# Patient Record
Sex: MALE | Race: WHITE | NOT HISPANIC OR LATINO | Employment: FULL TIME | URBAN - METROPOLITAN AREA
[De-identification: names, ages, dates, MRNs, and addresses within clinical notes are randomized per-mention and may not be internally consistent; named-entity substitution may affect disease eponyms.]

---

## 2017-01-28 ENCOUNTER — OFFICE VISIT (OUTPATIENT)
Dept: URGENT CARE | Facility: CLINIC | Age: 47
End: 2017-01-28
Payer: COMMERCIAL

## 2017-01-28 ENCOUNTER — APPOINTMENT (OUTPATIENT)
Dept: RADIOLOGY | Facility: IMAGING CENTER | Age: 47
End: 2017-01-28
Attending: PHYSICIAN ASSISTANT
Payer: COMMERCIAL

## 2017-01-28 VITALS
SYSTOLIC BLOOD PRESSURE: 130 MMHG | HEART RATE: 92 BPM | HEIGHT: 70 IN | WEIGHT: 203 LBS | RESPIRATION RATE: 16 BRPM | DIASTOLIC BLOOD PRESSURE: 88 MMHG | OXYGEN SATURATION: 97 % | TEMPERATURE: 98.5 F | BODY MASS INDEX: 29.06 KG/M2

## 2017-01-28 DIAGNOSIS — M79.672 LEFT FOOT PAIN: ICD-10-CM

## 2017-01-28 PROCEDURE — 99203 OFFICE O/P NEW LOW 30 MIN: CPT | Performed by: PHYSICIAN ASSISTANT

## 2017-01-28 PROCEDURE — 73630 X-RAY EXAM OF FOOT: CPT | Mod: TC | Performed by: PHYSICIAN ASSISTANT

## 2017-01-28 RX ORDER — HYDROCODONE BITARTRATE AND ACETAMINOPHEN 5; 325 MG/1; MG/1
1 TABLET ORAL EVERY 6 HOURS PRN
Qty: 12 TAB | Refills: 0 | Status: SHIPPED | OUTPATIENT
Start: 2017-01-28 | End: 2018-04-19

## 2017-01-28 RX ORDER — PREDNISONE 20 MG/1
40 TABLET ORAL DAILY
Qty: 10 TAB | Refills: 1 | Status: SHIPPED | OUTPATIENT
Start: 2017-01-28 | End: 2017-02-02

## 2017-01-28 RX ORDER — OMEPRAZOLE 20 MG/1
20 CAPSULE, DELAYED RELEASE ORAL DAILY
COMMUNITY
End: 2017-02-16 | Stop reason: SDUPTHER

## 2017-01-28 RX ORDER — LISINOPRIL 20 MG/1
40 TABLET ORAL DAILY
COMMUNITY
End: 2017-02-16 | Stop reason: CLARIF

## 2017-01-28 RX ORDER — TESTOSTERONE CYPIONATE 200 MG/ML
120 INJECTION, SOLUTION INTRAMUSCULAR ONCE
COMMUNITY
End: 2017-07-03 | Stop reason: SDUPTHER

## 2017-01-28 RX ORDER — CEFUROXIME AXETIL 500 MG/1
500 TABLET ORAL 2 TIMES DAILY
Qty: 20 TAB | Refills: 0 | Status: SHIPPED | OUTPATIENT
Start: 2017-01-28 | End: 2017-02-07

## 2017-01-28 ASSESSMENT — ENCOUNTER SYMPTOMS
WEAKNESS: 1
FALLS: 0
JOINT SWELLING: 1
FOCAL WEAKNESS: 1
NUMBNESS: 0
SENSORY CHANGE: 0

## 2017-01-28 NOTE — MR AVS SNAPSHOT
"        Neil Abdi   2017 12:30 PM   Office Visit   MRN: [id number]    Department:  [facility]   Dept Phone:  [phone]    Description:  Male : 1970   Provider:  Sherif Diez PA-C           Reason for Visit     Foot Pain x 2 wks, Lt. foot pain, swelling and brusing      Allergies as of 2017     No Known Allergies      You were diagnosed with     Left foot pain   [833977]         Vital Signs     Blood Pressure Pulse Temperature Respirations Height Weight    130/88 mmHg 92 36.9 °C (98.5 °F) 16 1.778 m (5' 10\") 92.08 kg (203 lb)    Body Mass Index Oxygen Saturation                29.13 kg/m2 97%          Basic Information     Date Of Birth Sex Race Ethnicity Preferred Language    1970 Male White Non- English      Health Maintenance     Patient has no pending health maintenance at this time      Current Immunizations     No immunizations on file.      Below and/or attached are the medications your provider expects you to take. Review all of your home medications and newly ordered medications with your provider and/or pharmacist. Follow medication instructions as directed by your provider and/or pharmacist. Please keep your medication list with you and share with your provider. Update the information when medications are discontinued, doses are changed, or new medications (including over-the-counter products) are added; and carry medication information at all times in the event of emergency situations     Allergies:  No Known Allergies          Medications  Valid as of: 2017 -  2:06 PM    Generic Name Brand Name Tablet Size Instructions for use    Cefuroxime Axetil (Tab) CEFTIN 500 MG Take 1 Tab by mouth 2 times a day for 10 days.        Cetirizine HCl   Take  by mouth.        Hydrocodone-Acetaminophen (Tab) NORCO 5-325 MG Take 1 Tab by mouth every 6 hours as needed.        Lisinopril (Tab) PRINIVIL 20 MG Take 40 mg by mouth every day.        Omeprazole " (CAPSULE DELAYED RELEASE) PRILOSEC 20 MG Take 20 mg by mouth every day.        PredniSONE (Tab) DELTASONE 20 MG Take 2 Tabs by mouth every day for 5 days.        Testosterone Cypionate (Solution) DEPO-TESTOSTERONE 200 MG/ML 50 mg by Intramuscular route Once.        .                 Medicines prescribed today were sent to:     [facility]Y #[id number] - [place] - [address]    [address] [place] [address]    Phone: [phone] Fax: [phone]    Open 24 Hours?: No      Medication refill instructions:       If your prescription bottle indicates you have medication refills left, it is not necessary to call your provider’s office. Please contact your pharmacy and they will refill your medication.    If your prescription bottle indicates you do not have any refills left, you may request refills at any time through one of the following ways: The online [employer] system (except Urgent Care), by calling your provider’s office, or by asking your pharmacy to contact your provider’s office with a refill request. Medication refills are processed only during regular business hours and may not be available until the next business day. Your provider may request additional information or to have a follow-up visit with you prior to refilling your medication.   *Please Note: Medication refills are assigned a new Rx number when refilled electronically. Your pharmacy may indicate that no refills were authorized even though a new prescription for the same medication is available at the pharmacy. Please request the medicine by name with the pharmacy before contacting your provider for a refill.        Your To Do List     Future Labs/Procedures Complete By Expires    DX-FOOT-COMPLETE 3+ LEFT  1/28/2017 1/28/2018         [employer] Access Code: [email or web address]  Expires: 2/27/2017  2:06 PM    [employer]  A secure, online tool to manage your health information     [employer]’s [employer]® is a secure, online tool that connects you to your  personalized health information from the privacy of your home -- day or night - making it very easy for you to manage your healthcare. Once the activation process is completed, you can even access your medical information using the [employer] angie, which is available for free in the Apple Angie store or Google Play store.     [employer] provides the following levels of access (as shown below):   My Chart Features   [facility] Doctor [facility]  Specialists [facility]  Urgent  Care Non-[facility]  Primary Care  Doctor   Email your healthcare team securely and privately 24/7 X X X    Manage appointments: schedule your next appointment; view details of past/upcoming appointments X      Request prescription refills. X      View recent personal medical records, including lab and immunizations X X X X   View health record, including health history, allergies, medications X X X X   Read reports about your outpatient visits, procedures, consult and ER notes X X X X   See your discharge summary, which is a recap of your hospital and/or ER visit that includes your diagnosis, lab results, and care plan. X X       How to register for [employer]:  1. Go to  [email or web address].  2. Click on the Sign Up Now box, which takes you to the New Member Sign Up page. You will need to provide the following information:  a. Enter your [employer] Access Code exactly as it appears at the top of this page. (You will not need to use this code after you’ve completed the sign-up process. If you do not sign up before the expiration date, you must request a new code.)   b. Enter your date of birth.   c. Enter your home email address.   d. Click Submit, and follow the next screen’s instructions.  3. Create a [employer]dacity ID. This will be your [employer] login ID and cannot be changed, so think of one that is secure and easy to remember.  4. Create a [employer] password. You can change your password at any time.  5. Enter your Password Reset Question and Answer. This can  be used at a later time if you forget your password.   6. Enter your e-mail address. This allows you to receive e-mail notifications when new information is available in [employer].  7. Click Sign Up. You can now view your health information.    For assistance activating your [employer] account, call [phone]

## 2017-01-28 NOTE — PROGRESS NOTES
"Subjective:      Neil Abdi is a 46 y.o. male who presents with Foot Pain            Other  This is a new (2 wks left foot inj) problem. The current episode started 1 to 4 weeks ago. The problem occurs constantly. The problem has been unchanged. Associated symptoms include joint swelling and weakness. Pertinent negatives include no numbness. The symptoms are aggravated by bending and walking. He has tried rest for the symptoms. The treatment provided mild relief.       Review of Systems   Musculoskeletal: Positive for joint pain and joint swelling. Negative for falls.   Neurological: Positive for focal weakness and weakness. Negative for sensory change and numbness.          Objective:     /88 mmHg  Pulse 92  Temp(Src) 36.9 °C (98.5 °F)  Resp 16  Ht 1.778 m (5' 10\")  Wt 92.08 kg (203 lb)  BMI 29.13 kg/m2  SpO2 97%     Physical Exam   Constitutional: He is oriented to person, place, and time. He appears well-developed and well-nourished. No distress.   Musculoskeletal: He exhibits edema and tenderness (redn/ttp ball foot/toes 2/3rd).        Left foot: There is tenderness, bony tenderness and swelling.        Feet:    Neurological: He is alert and oriented to person, place, and time. No sensory deficit. He exhibits abnormal muscle tone. Gait abnormal. Coordination normal.   Skin: Skin is warm and dry. There is erythema.   Psychiatric: He has a normal mood and affect. His behavior is normal. Judgment and thought content normal.   Nursing note and vitals reviewed.    Filed Vitals:    01/28/17 1257   BP: 130/88   Pulse: 92   Temp: 36.9 °C (98.5 °F)   Resp: 16   Height: 1.778 m (5' 10\")   Weight: 92.08 kg (203 lb)   SpO2: 97%     Active Ambulatory Problems     Diagnosis Date Noted   • No Active Ambulatory Problems     Resolved Ambulatory Problems     Diagnosis Date Noted   • No Resolved Ambulatory Problems     No Additional Past Medical History     No current outpatient prescriptions on file prior " to visit.     No current facility-administered medications on file prior to visit.     Gargles, Cepacol lozenges, Aleve/Advil as needed for throat pain  History reviewed. No pertinent family history.  Review of patient's allergies indicates no known allergies.         Xr=  ng(read/interpret. By me. Rw)       Assessment/Plan:     ·  L foot pain      · RICE, HC, anti inflamm

## 2017-01-31 ENCOUNTER — TELEPHONE (OUTPATIENT)
Dept: MEDICAL GROUP | Facility: PHYSICIAN GROUP | Age: 47
End: 2017-01-31

## 2017-01-31 NOTE — TELEPHONE ENCOUNTER
NEW PATIENT PRE-VISIT PLANNING    Called Neil Abdi in order to verify health topics prior to the New appointment.     1.  All medications were updated? yes    2.  Allergies were updated? yes    3.  All care teams were updated? N\A       •   Gait devices, O2, CPAP, etc: no        •   Eye professional: N\A       •   Other specialists (GYN, cardiology, endo, etc): N\A    4.  All pharmacies were updated? yes          Current Outpatient Prescriptions   Medication Sig Dispense Refill   • alprazolam (XANAX) 0.25 MG Tab Take 0.25 mg by mouth at bedtime as needed for Sleep.     • omeprazole (PRILOSEC) 20 MG delayed-release capsule Take 20 mg by mouth every day.     • Cetirizine HCl (ZYRTEC ALLERGY PO) Take  by mouth.     • lisinopril (PRINIVIL) 20 MG Tab Take 40 mg by mouth every day.     • testosterone cypionate (DEPO-TESTOSTERONE) 200 MG/ML Solution injection 50 mg by Intramuscular route Once.     • hydrocodone-acetaminophen (NORCO) 5-325 MG Tab per tablet Take 1 Tab by mouth every 6 hours as needed. 12 Tab 0   • predniSONE (DELTASONE) 20 MG Tab Take 2 Tabs by mouth every day for 5 days. 10 Tab 1   • cefUROXime (CEFTIN) 500 MG Tab Take 1 Tab by mouth 2 times a day for 10 days. 20 Tab 0     No current facility-administered medications for this visit.       5.  Patient may be due for these Health Maintenance Topics (update any if possible):            Health Maintenance Due   Topic Date Due   • IMM DTaP/Tdap/Td Vaccine (1 - Tdap) 09/06/1988   • IMM INFLUENZA (1) 09/01/2016                  6.  Immunizations were updated in [facility] using WebIZ?: No  No documentation in WebIz        a. Web Iz Recommendations: Unknown          7.  Former PCP records requested?: no       a. If yes, request was sent to        8.  Notes to provider or MA:       a. PT NOT SURE IF HE WILL KEEP APPOINTMENT , HE DID NOT EVEN KNOW ABOUT THIS APPOINTMENT HE DID NOT SCHEDULE         b.        Pt was encouraged to keep the appointment and to  arrive at least 15-20 minutes early.

## 2017-02-16 ENCOUNTER — OFFICE VISIT (OUTPATIENT)
Dept: MEDICAL GROUP | Facility: PHYSICIAN GROUP | Age: 47
End: 2017-02-16
Payer: COMMERCIAL

## 2017-02-16 VITALS
BODY MASS INDEX: 28.63 KG/M2 | OXYGEN SATURATION: 96 % | HEART RATE: 86 BPM | RESPIRATION RATE: 16 BRPM | TEMPERATURE: 98.7 F | HEIGHT: 70 IN | DIASTOLIC BLOOD PRESSURE: 80 MMHG | SYSTOLIC BLOOD PRESSURE: 128 MMHG | WEIGHT: 200 LBS

## 2017-02-16 DIAGNOSIS — M79.672 LEFT FOOT PAIN: ICD-10-CM

## 2017-02-16 DIAGNOSIS — E29.1 HYPOGONADISM IN MALE: ICD-10-CM

## 2017-02-16 DIAGNOSIS — K21.9 GASTROESOPHAGEAL REFLUX DISEASE WITHOUT ESOPHAGITIS: ICD-10-CM

## 2017-02-16 DIAGNOSIS — J45.990 EXERCISE-INDUCED ASTHMA: ICD-10-CM

## 2017-02-16 DIAGNOSIS — I10 ESSENTIAL HYPERTENSION: ICD-10-CM

## 2017-02-16 DIAGNOSIS — Z88.9 MULTIPLE ALLERGIES: ICD-10-CM

## 2017-02-16 PROCEDURE — 99214 OFFICE O/P EST MOD 30 MIN: CPT | Performed by: NURSE PRACTITIONER

## 2017-02-16 RX ORDER — LISINOPRIL 40 MG/1
40 TABLET ORAL DAILY
COMMUNITY
Start: 2017-02-14 | End: 2017-03-07 | Stop reason: SDUPTHER

## 2017-02-16 RX ORDER — OMEPRAZOLE 20 MG/1
20 CAPSULE, DELAYED RELEASE ORAL DAILY
Qty: 90 CAP | Refills: 3 | Status: SHIPPED | OUTPATIENT
Start: 2017-02-16 | End: 2018-01-31 | Stop reason: SDUPTHER

## 2017-02-16 ASSESSMENT — PATIENT HEALTH QUESTIONNAIRE - PHQ9: CLINICAL INTERPRETATION OF PHQ2 SCORE: 0

## 2017-02-16 ASSESSMENT — PAIN SCALES - GENERAL: PAINLEVEL: 1=MINIMAL PAIN

## 2017-02-16 NOTE — Clinical Note
[facility]  KATHARINA LeggettP.RDESI.  [address]  [place] [address]  Fax: [phone]   Authorization for Release/Disclosure of   Protected Health Information   Name: AGUEDA RAMIRES : 1970 SSN: [id number]   Address: [address] [place] [address] [place] [address] Phone:    [phone] (home)    I authorize the entity listed below to release/disclose the PHI below to:   [facility]/Oswald Burton A.P.R.SANYA. and KATHARINA LeggettP.RDESI.   Provider or Entity Name:  Dr. Worthy   [facility], [place], [place] Phone:  [phone]    Fax:     Reason for request: continuity of care   Information to be released:    [  ] LAST COLONOSCOPY,  including any PATH REPORT and follow-up  [  ] LAST FIT/COLOGUARD RESULT [  ] LAST DEXA  [  ] LAST MAMMOGRAM  [  ] LAST PAP  [  ] LAST LABS [  ] RETINA EXAM REPORT  [  ] IMMUNIZATION RECORDS  [x] Release all info      [  ] Check here and initial the line next to each item to release ALL health information INCLUDING  _____ Care and treatment for drug and / or alcohol abuse  _____ HIV testing, infection status, or AIDS  _____ Genetic Testing    DATES OF SERVICE OR TIME PERIOD TO BE DISCLOSED: _____________  I understand and acknowledge that:  * This Authorization may be revoked at any time by you in writing, except if your health information has already been used or disclosed.  * Your health information that will be used or disclosed as a result of you signing this authorization could be re-disclosed by the recipient. If this occurs, your re-disclosed health information may no longer be protected by State or Federal laws.  * You may refuse to sign this Authorization. Your refusal will not affect your ability to obtain treatment.  * This Authorization becomes effective upon signing and will  on (date) __________.      If no date is indicated, this Authorization will  one (1) year from the signature date.    Name: Agueda  Gibran Abdi    Signature:   Date:     2/16/2017       PLEASE FAX REQUESTED RECORDS BACK TO: [phone]

## 2017-02-16 NOTE — MR AVS SNAPSHOT
"        Neil Currywell   2017 4:40 PM   Office Visit   MRN: [id number]    Department:  [facility]   Dept Phone:  [phone]    Description:  Male : 1970   Provider:  ARA Leggett           Reason for Visit     Establish Care New Patient       Allergies as of 2017     No Known Allergies      You were diagnosed with     Essential hypertension   [1153409]       Gastroesophageal reflux disease without esophagitis   [878976]       Multiple allergies   [290094]       Hypogonadism in male   [5370986]       Exercise-induced asthma   [498761]       Left foot pain   [952475]         Vital Signs     Blood Pressure Pulse Temperature Respirations Height Weight    128/80 mmHg 86 37.1 °C (98.7 °F) 16 1.778 m (5' 10\") 90.719 kg (200 lb)    Body Mass Index Oxygen Saturation Smoking Status             28.70 kg/m2 96% Former Smoker         Basic Information     Date Of Birth Sex Race Ethnicity Preferred Language    1970 Male White Non- English      Your appointments     2017  6:45 AM   Adult Draw/Collection with [facility]   [facility] (--)    [address]  [facility] [address]   [phone]              Problem List              ICD-10-CM Priority Class Noted - Resolved    Essential hypertension I10   2017 - Present    Gastroesophageal reflux disease without esophagitis K21.9   2017 - Present    Multiple allergies Z88.9   2017 - Present    Hypogonadism in male E29.1   2017 - Present    Exercise-induced asthma J45.990   2017 - Present      Health Maintenance        Date Due Completion Dates    IMM INFLUENZA (1) 2018 (Originally 2016) ---    IMM DTaP/Tdap/Td Vaccine (2 - Td) 2026            Current Immunizations     Tdap Vaccine 2016      Below and/or attached are the medications your provider expects you to take. Review all of your home medications and newly ordered medications with your provider and/or pharmacist. " Follow medication instructions as directed by your provider and/or pharmacist. Please keep your medication list with you and share with your provider. Update the information when medications are discontinued, doses are changed, or new medications (including over-the-counter products) are added; and carry medication information at all times in the event of emergency situations     Allergies:  No Known Allergies          Medications  Valid as of: February 16, 2017 -  5:29 PM    Generic Name Brand Name Tablet Size Instructions for use    Albuterol Sulfate   Inhale  by mouth.        Cetirizine HCl   Take  by mouth.        Hydrocodone-Acetaminophen (Tab) NORCO 5-325 MG Take 1 Tab by mouth every 6 hours as needed.        Lisinopril (Tab) PRINIVIL, ZESTRIL 40 MG Take 40 mg by mouth every day.        Omeprazole (CAPSULE DELAYED RELEASE) PRILOSEC 20 MG Take 1 Cap by mouth every day.        Testosterone Cypionate (Solution) DEPO-TESTOSTERONE 200 MG/ mg by Intramuscular route Once.        .                 Medicines prescribed today were sent to:     [facility]/PHARMACY #[id number] - [place] - [address] [address]    Phone: [phone] Fax: [phone]    Open 24 Hours?: No      Medication refill instructions:       If your prescription bottle indicates you have medication refills left, it is not necessary to call your provider’s office. Please contact your pharmacy and they will refill your medication.    If your prescription bottle indicates you do not have any refills left, you may request refills at any time through one of the following ways: The online [employer] system (except Urgent Care), by calling your provider’s office, or by asking your pharmacy to contact your provider’s office with a refill request. Medication refills are processed only during regular business hours and may not be available until the next business day. Your provider may request additional information or to have a follow-up visit  with you prior to refilling your medication.   *Please Note: Medication refills are assigned a new Rx number when refilled electronically. Your pharmacy may indicate that no refills were authorized even though a new prescription for the same medication is available at the pharmacy. Please request the medicine by name with the pharmacy before contacting your provider for a refill.        Your To Do List     Future Labs/Procedures Complete By Expires    COMP METABOLIC PANEL  As directed 2/16/2018    LIPID PROFILE  As directed 2/16/2018    MICROALBUMIN CREAT RATIO URINE  As directed 2/16/2018      Referral     A referral request has been sent to our patient care coordination department. Please allow 3-5 business days for us to process this request and contact you either by phone or mail. If you do not hear from us by the 5th business day, please call us at [phone].           [employer] Access Code: Activation code not generated  Current [employer] Status: Active

## 2017-02-17 ENCOUNTER — HOSPITAL ENCOUNTER (OUTPATIENT)
Dept: LAB | Facility: MEDICAL CENTER | Age: 47
End: 2017-02-17
Attending: NURSE PRACTITIONER
Payer: COMMERCIAL

## 2017-02-17 DIAGNOSIS — I10 ESSENTIAL HYPERTENSION: ICD-10-CM

## 2017-02-17 DIAGNOSIS — K21.9 GASTROESOPHAGEAL REFLUX DISEASE WITHOUT ESOPHAGITIS: ICD-10-CM

## 2017-02-17 LAB
ALBUMIN SERPL BCP-MCNC: 4.3 G/DL (ref 3.2–4.9)
ALBUMIN/GLOB SERPL: 1.5 G/DL
ALP SERPL-CCNC: 60 U/L (ref 30–99)
ALT SERPL-CCNC: 60 U/L (ref 2–50)
ANION GAP SERPL CALC-SCNC: 7 MMOL/L (ref 0–11.9)
AST SERPL-CCNC: 38 U/L (ref 12–45)
BILIRUB SERPL-MCNC: 0.9 MG/DL (ref 0.1–1.5)
BUN SERPL-MCNC: 16 MG/DL (ref 8–22)
CALCIUM SERPL-MCNC: 9.2 MG/DL (ref 8.5–10.5)
CHLORIDE SERPL-SCNC: 106 MMOL/L (ref 96–112)
CHOLEST SERPL-MCNC: 185 MG/DL (ref 100–199)
CO2 SERPL-SCNC: 26 MMOL/L (ref 20–33)
CREAT SERPL-MCNC: 1.35 MG/DL (ref 0.5–1.4)
CREAT UR-MCNC: 136.2 MG/DL
GLOBULIN SER CALC-MCNC: 2.9 G/DL (ref 1.9–3.5)
GLUCOSE SERPL-MCNC: 93 MG/DL (ref 65–99)
HDLC SERPL-MCNC: 41 MG/DL
LDLC SERPL CALC-MCNC: 100 MG/DL
MICROALBUMIN UR-MCNC: <0.7 MG/DL
MICROALBUMIN/CREAT UR: NORMAL MG/G (ref 0–30)
POTASSIUM SERPL-SCNC: 4 MMOL/L (ref 3.6–5.5)
PROT SERPL-MCNC: 7.2 G/DL (ref 6–8.2)
SODIUM SERPL-SCNC: 139 MMOL/L (ref 135–145)
TRIGL SERPL-MCNC: 222 MG/DL (ref 0–149)
URATE SERPL-MCNC: 9.2 MG/DL (ref 2.5–8.3)

## 2017-02-17 PROCEDURE — 36415 COLL VENOUS BLD VENIPUNCTURE: CPT

## 2017-02-17 PROCEDURE — 80053 COMPREHEN METABOLIC PANEL: CPT

## 2017-02-17 PROCEDURE — 84402 ASSAY OF FREE TESTOSTERONE: CPT

## 2017-02-17 PROCEDURE — 84550 ASSAY OF BLOOD/URIC ACID: CPT

## 2017-02-17 PROCEDURE — 84403 ASSAY OF TOTAL TESTOSTERONE: CPT

## 2017-02-17 PROCEDURE — 82043 UR ALBUMIN QUANTITATIVE: CPT

## 2017-02-17 PROCEDURE — 80061 LIPID PANEL: CPT

## 2017-02-17 PROCEDURE — 84270 ASSAY OF SEX HORMONE GLOBUL: CPT

## 2017-02-17 PROCEDURE — 82570 ASSAY OF URINE CREATININE: CPT

## 2017-02-17 NOTE — ASSESSMENT & PLAN NOTE
Chronic in nature. Stable treated with over-the-counter Zyrtec and Flonase. Patient states that these medications work well to control symptoms. Patient is allergic to animals as well as environmental pollens. Patient will continue to take these medications and let this provider know if symptoms do not and do not continue to be well controlled.

## 2017-02-17 NOTE — ASSESSMENT & PLAN NOTE
Chronic in nature. Stable. Patient uses albuterol HFA inhaler 15 minutes prior to exercise which works well to control his symptoms of wheezing and shortness of breath. denies shortness of breath and wheezing at this time. Patient denies side effects including palpitations from his medication.

## 2017-02-17 NOTE — ASSESSMENT & PLAN NOTE
Chronic in nature. Stable. Patient is currently taking lisinopril 40 mg daily for blood pressure. Blood pressure today is 128/80. Patient denies side effects including cough for medication. Patient denies chest pain, palpitations, dizziness, shortness of breath, headache or blurry vision.

## 2017-02-17 NOTE — ASSESSMENT & PLAN NOTE
This is a new problem over the last year. Patient was diagnosed in [facility] records are being requested at this time. Testosterone level is ordered at this time to assess effectiveness of current therapy. Patient is taking testosterone cypionate 120 mg weekly. Which patient states control his symptoms including fatigue, irritation, weight gain. States he feels well.

## 2017-02-17 NOTE — PROGRESS NOTES
Chief Complaint   Patient presents with   • Establish Care     New Patient        HISTORY OF THE PRESENT ILLNESS: This is a 46 y.o. male new patient to our practice. This pleasant patient is here today to establish care and discuss multiple chronic issues.    Essential hypertension  Chronic in nature. Stable. Patient is currently taking lisinopril 40 mg daily for blood pressure. Blood pressure today is 128/80. Patient denies side effects including cough for medication. Patient denies chest pain, palpitations, dizziness, shortness of breath, headache or blurry vision.    Exercise-induced asthma  Chronic in nature. Stable. Patient uses albuterol HFA inhaler 15 minutes prior to exercise which works well to control his symptoms of wheezing and shortness of breath. denies shortness of breath and wheezing at this time. Patient denies side effects including palpitations from his medication.    Gastroesophageal reflux disease without esophagitis  Chronic in nature. Stable. Patient takes omeprazole 20 mg daily for this condition. Patient denies epigastric pain and burning in his throat blood in his stool or any other changes in his stool. Patient states he was seen previously for this by gastroenterology. Discussed with patient that we may consider referring him for upper endoscopy with GI related to long-standing GERD.    Hypogonadism in male  This is a new problem over the last year. Patient was diagnosed in [facility] records are being requested at this time. Testosterone level is ordered at this time to assess effectiveness of current therapy. Patient is taking testosterone cypionate 120 mg weekly. Which patient states control his symptoms including fatigue, irritation, weight gain. States he feels well.    Multiple allergies  Chronic in nature. Stable treated with over-the-counter Zyrtec and Flonase. Patient states that these medications work well to control symptoms. Patient is allergic to animals as well as  environmental pollens. Patient will continue to take these medications and let this provider know if symptoms do not and do not continue to be well controlled.      Past Medical History   Diagnosis Date   • Asthma    • Anxiety    • GERD (gastroesophageal reflux disease)    • Hypertension    • Migraine        Past Surgical History   Procedure Laterality Date   • Laminotomy         Family Status   Relation Status Death Age   • Mother     • Father Alive    • Sister Alive    • Sister Alive    • Sister Alive      Family History   Problem Relation Age of Onset   • Cancer Mother      colon   • No Known Problems Father    • No Known Problems Sister    • No Known Problems Sister    • No Known Problems Sister        Social History   Substance Use Topics   • Smoking status: Former Smoker     Quit date: 2002   • Smokeless tobacco: Former User   • Alcohol Use: 1.8 oz/week     1 Glasses of wine, 1 Cans of beer, 1 Shots of liquor per week      Comment: weekly       Allergies: Review of patient's allergies indicates no known allergies.    Current Outpatient Prescriptions Ordered in [facility]   Medication Sig Dispense Refill   • ALBUTEROL SULFATE HFA INH Inhale  by mouth.     • omeprazole (PRILOSEC) 20 MG delayed-release capsule Take 1 Cap by mouth every day. 90 Cap 3   • Cetirizine HCl (ZYRTEC ALLERGY PO) Take  by mouth.     • lisinopril (PRINIVIL, ZESTRIL) 40 MG tablet Take 40 mg by mouth every day.     • testosterone cypionate (DEPO-TESTOSTERONE) 200 MG/ML Solution injection 120 mg by Intramuscular route Once.     • hydrocodone-acetaminophen (NORCO) 5-325 MG Tab per tablet Take 1 Tab by mouth every 6 hours as needed. 12 Tab 0     No current Epic-ordered facility-administered medications on file.       Review of Systems   Constitutional:  Negative for fever, chills, weight loss and malaise/fatigue.   HENT:  Negative for ear pain, nosebleeds, congestion, sore throat and neck pain.    Eyes:  Negative for blurred vision.  "  Respiratory:  Negative for cough, sputum production, shortness of breath and wheezing.    Cardiovascular:  Negative for chest pain, palpitations, orthopnea and leg swelling.   Gastrointestinal:  Negative for heartburn, nausea, vomiting and abdominal pain.   Genitourinary:  Negative for dysuria, urgency and frequency.   Musculoskeletal:  Negative for myalgias, back pain and joint pain.   Skin:  Negative for rash and itching.   Neurological:  Negative for dizziness, tingling, tremors, sensory change, focal weakness and headaches.   Endo/Heme/Allergies:  Does not bruise/bleed easily.   Psychiatric/Behavioral:  Negative for depression, anxiety, or memory loss.     All other systems reviewed and are negative except as in HPI.    Exam: Blood pressure 128/80, pulse 86, temperature 37.1 °C (98.7 °F), resp. rate 16, height 1.778 m (5' 10\"), weight 90.719 kg (200 lb), SpO2 96 %.  General:  Normal appearing. No distress.  HEENT:  Normocephalic. Eyes conjunctiva clear lids without ptosis, pupils equal and reactive to light accommodation, ears normal shape and contour, canals are clear bilaterally, tympanic membranes are benign, nasal mucosa benign, oropharynx is without erythema, edema or exudates.   Neck:  Supple without JVD or bruit. Thyroid is not enlarged.  Pulmonary:  Clear to ausculation.  Normal effort. No rales, ronchi, or wheezing.  Cardiovascular:  Regular rate and rhythm without murmur. Carotid and radial pulses are intact and equal bilaterally.  Abdomen:  Soft, nontender, nondistended. Normal bowel sounds. Liver and spleen are not palpable  Neurologic:  Grossly nonfocal  Lymph:  No cervical, supraclavicular or axillary lymph nodes are palpable  Skin:  Warm and dry.  No obvious lesions.  Musculoskeletal:  Normal gait. No extremity cyanosis, clubbing, or edema.  Psych:  Normal mood and affect. Alert and oriented x3. Judgment and insight is normal.    Medical decision making:  Neil is a generally well-appearing " 46-year-old male patient here today to establish care and discuss multiple issues with regard to essential hypertension gastroesophageal reflux allergies and exercise-induced asthma patient is stable at this time. With regards to testosterone therapy patient states that he does need an updated testosterone level, patient is currently taking 20 mg weekly, plan to reassess labs patient states symptoms are doing well. We'll obtain records from primary care provider. Labs ordered today include microalbumin creatinine ratio conference metabolic panel and lipid profile as well as a testosterone and the uric acid. Patient may have had a gout flare in the end of January, or it may have been an infection he is unsure and would like to see if we can find any further information about what happened with the pain in his left foot which he describes as a red inflamed ball on his great toe joint on his left foot. This is resolved at this time. Patient is also referred to ophthalmology because he has never had a retinal exam in the past. Patient will follow up as needed will call patient with results of labs. Patient is encouraged to be seen in the emergency room for chest pain, palpitations, shortness of breath, dizziness, severe abdominal pain or other concerning symptoms.      Please note that this dictation was created using voice recognition software. I have made every reasonable attempt to correct obvious errors, but I expect that there are errors of grammar and possibly content that I did not discover before finalizing the note.      Assessment/Plan  1. Essential hypertension  MICROALBUMIN CREAT RATIO URINE    REFERRAL TO OPHTHALMOLOGY   2. Gastroesophageal reflux disease without esophagitis  omeprazole (PRILOSEC) 20 MG delayed-release capsule    COMP METABOLIC PANEL    LIPID PROFILE   3. Multiple allergies     4. Hypogonadism in male  TESTOSTERONE, FREE AND TOTAL   5. Exercise-induced asthma     6. Left foot pain  URIC  ACID, SERUM         I have placed the below orders and discussed them with an approved delegating provider. The MA is performing the below orders under the direction of Dr. Terry

## 2017-02-17 NOTE — ASSESSMENT & PLAN NOTE
Chronic in nature. Stable. Patient takes omeprazole 20 mg daily for this condition. Patient denies epigastric pain and burning in his throat blood in his stool or any other changes in his stool. Patient states he was seen previously for this by gastroenterology. Discussed with patient that we may consider referring him for upper endoscopy with GI related to long-standing GERD.

## 2017-02-18 LAB
SHBG SERPL-SCNC: 9 NMOL/L (ref 11–80)
TESTOST FREE MFR SERPL: 3 % (ref 1.6–2.9)
TESTOST FREE SERPL-MCNC: 186 PG/ML (ref 47–244)
TESTOST SERPL-MCNC: 620 NG/DL (ref 300–890)

## 2017-02-21 ENCOUNTER — TELEPHONE (OUTPATIENT)
Dept: MEDICAL GROUP | Facility: PHYSICIAN GROUP | Age: 47
End: 2017-02-21

## 2017-02-21 NOTE — TELEPHONE ENCOUNTER
1. Caller Name: Neil Abdi                                         Call Back Number: [phone] (home)     2. Message: Patient has been notified of results and is fine on current dose.     3. Patient approves office to leave a detailed voicemail/MyChart message: N\A

## 2017-02-21 NOTE — TELEPHONE ENCOUNTER
----- Message from ARA Leggett sent at 2/21/2017  8:00 AM PST -----  Please call pt and give lab results: Total testosterone is 620, percent free is 3.0 which is the temperature percent high. If you're feeling well we will continue your current dose.

## 2017-03-07 ENCOUNTER — OFFICE VISIT (OUTPATIENT)
Dept: MEDICAL GROUP | Facility: PHYSICIAN GROUP | Age: 47
End: 2017-03-07
Payer: COMMERCIAL

## 2017-03-07 VITALS
HEART RATE: 80 BPM | HEIGHT: 70 IN | WEIGHT: 216 LBS | OXYGEN SATURATION: 100 % | RESPIRATION RATE: 16 BRPM | TEMPERATURE: 97 F | DIASTOLIC BLOOD PRESSURE: 90 MMHG | BODY MASS INDEX: 30.92 KG/M2 | SYSTOLIC BLOOD PRESSURE: 132 MMHG

## 2017-03-07 DIAGNOSIS — L70.0 ACNE VULGARIS: ICD-10-CM

## 2017-03-07 DIAGNOSIS — I10 ESSENTIAL HYPERTENSION: ICD-10-CM

## 2017-03-07 DIAGNOSIS — E78.2 MIXED HYPERLIPIDEMIA: ICD-10-CM

## 2017-03-07 DIAGNOSIS — E29.1 HYPOGONADISM IN MALE: ICD-10-CM

## 2017-03-07 DIAGNOSIS — M1A.9XX0 CHRONIC GOUT INVOLVING TOE OF LEFT FOOT WITHOUT TOPHUS, UNSPECIFIED CAUSE: ICD-10-CM

## 2017-03-07 PROCEDURE — 99214 OFFICE O/P EST MOD 30 MIN: CPT | Performed by: NURSE PRACTITIONER

## 2017-03-07 RX ORDER — ALLOPURINOL 100 MG/1
100 TABLET ORAL DAILY
Qty: 28 TAB | Refills: 0 | Status: SHIPPED | OUTPATIENT
Start: 2017-03-07 | End: 2017-05-02

## 2017-03-07 RX ORDER — ALLOPURINOL 300 MG/1
300 TABLET ORAL DAILY
Qty: 70 TAB | Refills: 0 | Status: SHIPPED | OUTPATIENT
Start: 2017-03-07 | End: 2017-05-02

## 2017-03-07 RX ORDER — LISINOPRIL 40 MG/1
40 TABLET ORAL DAILY
Qty: 90 TAB | Refills: 3 | Status: SHIPPED | OUTPATIENT
Start: 2017-03-07 | End: 2018-01-31 | Stop reason: SDUPTHER

## 2017-03-07 RX ORDER — DOXYCYCLINE HYCLATE 100 MG
100 TABLET ORAL DAILY
Qty: 90 TAB | Refills: 0 | Status: SHIPPED | OUTPATIENT
Start: 2017-03-07 | End: 2017-05-02

## 2017-03-07 ASSESSMENT — PAIN SCALES - GENERAL: PAINLEVEL: 6=MODERATE PAIN

## 2017-03-07 NOTE — MR AVS SNAPSHOT
"        Neil Abdi   3/7/2017 4:20 PM   Office Visit   MRN: [id number]    Department:  [facility]   Dept Phone:  [phone]    Description:  Male : 1970   Provider:  ARA Leggett           Reason for Visit     Follow-Up 1 Month Follow UP       Allergies as of 3/7/2017     No Known Allergies      You were diagnosed with     Chronic gout involving toe of left foot without tophus, unspecified cause   [8998872]       Acne vulgaris   [177004]       Essential hypertension   [6088217]         Vital Signs     Blood Pressure Pulse Temperature Respirations Height Weight    132/90 mmHg 80 36.1 °C (97 °F) 16 1.778 m (5' 10\") 97.977 kg (216 lb)    Body Mass Index Oxygen Saturation Smoking Status             30.99 kg/m2 100% Former Smoker         Basic Information     Date Of Birth Sex Race Ethnicity Preferred Language    1970 Male White Non- English      Problem List              ICD-10-CM Priority Class Noted - Resolved    Essential hypertension I10   2017 - Present    Gastroesophageal reflux disease without esophagitis K21.9   2017 - Present    Multiple allergies Z88.9   2017 - Present    Hypogonadism in male E29.1   2017 - Present    Exercise-induced asthma J45.990   2017 - Present      Health Maintenance        Date Due Completion Dates    IMM INFLUENZA (1) 2018 (Originally 2016) ---    IMM DTaP/Tdap/Td Vaccine (2 - Td) 2026            Current Immunizations     Tdap Vaccine 2016      Below and/or attached are the medications your provider expects you to take. Review all of your home medications and newly ordered medications with your provider and/or pharmacist. Follow medication instructions as directed by your provider and/or pharmacist. Please keep your medication list with you and share with your provider. Update the information when medications are discontinued, doses are changed, or new medications (including " over-the-counter products) are added; and carry medication information at all times in the event of emergency situations     Allergies:  No Known Allergies          Medications  Valid as of: March 07, 2017 -  4:58 PM    Generic Name Brand Name Tablet Size Instructions for use    Albuterol Sulfate   Inhale  by mouth.        Allopurinol (Tab) ZYLOPRIM 100 MG Take 1 Tab by mouth every day.        Allopurinol (Tab) ZYLOPRIM 300 MG Take 1 Tab by mouth every day.        Cetirizine HCl   Take  by mouth.        Doxycycline Hyclate (Tab) VIBRAMYCIN 100 MG Take 1 Tab by mouth every day.        Hydrocodone-Acetaminophen (Tab) NORCO 5-325 MG Take 1 Tab by mouth every 6 hours as needed.        Lisinopril (Tab) PRINIVIL, ZESTRIL 40 MG Take 1 Tab by mouth every day.        Omeprazole (CAPSULE DELAYED RELEASE) PRILOSEC 20 MG Take 1 Cap by mouth every day.        Testosterone Cypionate (Solution) DEPO-TESTOSTERONE 200 MG/ mg by Intramuscular route Once.        Tretinoin (Cream) RETIN-A 0.025 % Apply daily after cleansing.        .                 Medicines prescribed today were sent to:     [facility][id number] - [place] - [address] [place] [address]    Phone: [phone] Fax: [phone]    Open 24 Hours?: No      Medication refill instructions:       If your prescription bottle indicates you have medication refills left, it is not necessary to call your provider’s office. Please contact your pharmacy and they will refill your medication.    If your prescription bottle indicates you do not have any refills left, you may request refills at any time through one of the following ways: The online [employer] system (except Urgent Care), by calling your provider’s office, or by asking your pharmacy to contact your provider’s office with a refill request. Medication refills are processed only during regular business hours and may not be available until the next business day. Your provider may request additional  information or to have a follow-up visit with you prior to refilling your medication.   *Please Note: Medication refills are assigned a new Rx number when refilled electronically. Your pharmacy may indicate that no refills were authorized even though a new prescription for the same medication is available at the pharmacy. Please request the medicine by name with the pharmacy before contacting your provider for a refill.        Your To Do List     Future Labs/Procedures Complete By Expires    COMP METABOLIC PANEL  6/7/2017 3/7/2018    LIPID PROFILE  6/7/2017 3/7/2018         MyChart Access Code: Activation code not generated  Current [employer] Status: Active

## 2017-03-08 NOTE — ASSESSMENT & PLAN NOTE
Chronic in nature. Stable. Patient is currently taking lisinopril 40 mg daily for blood pressure blood pressure today is 132/90. Patient states he does take his blood pressure at home and it normally runs 120s over 80s. She denies side effects including cough. Patient denies chest pain, palpitations, dizziness, shortness of breath, headache or blurry vision

## 2017-03-08 NOTE — PROGRESS NOTES
Chief Complaint   Patient presents with   • Follow-Up     1 Month Follow UP        HISTORY OF PRESENT ILLNESS: Patient is a 46 y.o. male established patient who presents today to discuss lab work, gout and acne.    Essential hypertension  Chronic in nature. Stable. Patient is currently taking lisinopril 40 mg daily for blood pressure blood pressure today is 132/90. Patient states he does take his blood pressure at home and it normally runs 120s over 80s. She denies side effects including cough. Patient denies chest pain, palpitations, dizziness, shortness of breath, headache or blurry vision    Hypogonadism in male  Chronic in nature. Stable. Last testosterone was 620. Patient he testosterone cypionate 120 mg weekly patient states this dosage controls his symptoms which include fatigue, irritation, weight gain. Patient states he feels well at this time and is not having side effects from the medication. Did discuss mildly elevated liver enzyme, we'll monitor this. Patient is not having any abdominal pain or other symptoms does not have any risk factors for hepatitis.    Acne vulgaris  This is a new problem. Patient states that he is having severe cystic acne breakouts on his back. Patient states that he is bathing daily, keeping the area dry as best he can, and using over-the-counter topical products to reduce symptoms. Patient states that this is not effective. Patient states that this or painful, and is requesting treatment for acne at this time. Doxycycline 100 mg daily ×3 months prescribed at this time as well as topical retinoid. Patient is encouraged to follow up in one month to assess the progress of this treatment.    Chronic gout involving toe of left foot without tophus  Chronic in nature. Last acute flare was approximately one month ago. Patient is not currently taking any suppressive therapy for this issue. Patient does use over-the-counter NSAIDs during acute flare which work well. Uric acid level 9.2.  Plan to start allopurinol 100 mg daily and increase to 300 mg daily over the next month we'll recheck uric acid level in one month. If uric acid level is decreasing will continue 300 mg allopurinol over the next 2 months and recheck level again in June. Goal will be uric acid level less than 6. Up-to-date patient education provided regarding managing acute gout flare. Discussed side effects of allopurinol patient states all questions were answered at this time and he is amenable to trying this medication.    Mixed hyperlipidemia  Lab Results   Component Value Date/Time    CHOLESTEROL, 02/17/2017 06:42 AM    * 02/17/2017 06:42 AM    HDL 41 02/17/2017 06:42 AM    TRIGLYCERIDES 222* 02/17/2017 06:42 AM       Lab Results   Component Value Date/Time    SODIUM 139 02/17/2017 06:42 AM    POTASSIUM 4.0 02/17/2017 06:42 AM    CHLORIDE 106 02/17/2017 06:42 AM    CO2 26 02/17/2017 06:42 AM    GLUCOSE 93 02/17/2017 06:42 AM    BUN 16 02/17/2017 06:42 AM    CREATININE 1.35 02/17/2017 06:42 AM     Lab Results   Component Value Date/Time    ALKALINE PHOSPHATASE 60 02/17/2017 06:42 AM    AST(SGOT) 38 02/17/2017 06:42 AM    ALT(SGPT) 60* 02/17/2017 06:42 AM    TOTAL BILIRUBIN 0.9 02/17/2017 06:42 AM                  Patient Active Problem List    Diagnosis Date Noted   • Acne vulgaris 03/07/2017   • Chronic gout involving toe of left foot without tophus 03/07/2017   • Mixed hyperlipidemia 03/07/2017   • Essential hypertension 02/16/2017   • Gastroesophageal reflux disease without esophagitis 02/16/2017   • Multiple allergies 02/16/2017   • Hypogonadism in male 02/16/2017   • Exercise-induced asthma 02/16/2017       Allergies:Review of patient's allergies indicates no known allergies.    Current Outpatient Prescriptions   Medication Sig Dispense Refill   • allopurinol (ZYLOPRIM) 100 MG Tab Take 1 Tab by mouth every day. 28 Tab 0   • allopurinol (ZYLOPRIM) 300 MG Tab Take 1 Tab by mouth every day. 70 Tab 0   • lisinopril  (PRINIVIL, ZESTRIL) 40 MG tablet Take 1 Tab by mouth every day. 90 Tab 3   • doxycycline (VIBRAMYCIN) 100 MG Tab Take 1 Tab by mouth every day. 90 Tab 0   • tretinoin (RETIN-A) 0.025 % cream Apply daily after cleansing. 45 g 3   • ALBUTEROL SULFATE HFA INH Inhale  by mouth.     • omeprazole (PRILOSEC) 20 MG delayed-release capsule Take 1 Cap by mouth every day. 90 Cap 3   • Cetirizine HCl (ZYRTEC ALLERGY PO) Take  by mouth.     • testosterone cypionate (DEPO-TESTOSTERONE) 200 MG/ML Solution injection 120 mg by Intramuscular route Once.     • hydrocodone-acetaminophen (NORCO) 5-325 MG Tab per tablet Take 1 Tab by mouth every 6 hours as needed. 12 Tab 0     No current facility-administered medications for this visit.       Social History   Substance Use Topics   • Smoking status: Former Smoker     Quit date: 2002   • Smokeless tobacco: Former User   • Alcohol Use: 1.8 oz/week     1 Glasses of wine, 1 Cans of beer, 1 Shots of liquor per week      Comment: weekly       Family Status   Relation Status Death Age   • Mother     • Father Alive    • Sister Alive    • Sister Alive    • Sister Alive      Family History   Problem Relation Age of Onset   • Cancer Mother      colon   • No Known Problems Father    • No Known Problems Sister    • No Known Problems Sister    • No Known Problems Sister        Review of Systems:   Constitutional:  Negative for fever, chills, weight loss and malaise/fatigue.   HEENT:  Negative for ear pain, nosebleeds, congestion, sore throat and neck pain.    Eyes:  Negative for blurred vision.   Respiratory:  Negative for cough, sputum production, shortness of breath and wheezing.    Cardiovascular:  Negative for chest pain, palpitations, orthopnea and leg swelling.   Gastrointestinal:  Negative for heartburn, nausea, vomiting and abdominal pain.   Genitourinary:  Negative for dysuria, urgency and frequency.   Musculoskeletal:  Negative for myalgias, back pain and joint pain.   Skin:   "Negative for rash and itching.   Neurological:  Negative for dizziness, tingling, tremors, sensory change, focal weakness and headaches.   Endo/Heme/Allergies:  Does not bruise/bleed easily.   Psychiatric/Behavioral:  Negative for depression, suicidal ideas and memory loss.  The patient is not nervous/anxious and does not have insomnia.    All other systems reviewed and are negative except as in HPI.    Exam:  Blood pressure 132/90, pulse 80, temperature 36.1 °C (97 °F), resp. rate 16, height 1.778 m (5' 10\"), weight 97.977 kg (216 lb), SpO2 100 %.  General:  Normal appearing. No distress.  HEENT:  Normocephalic. Eyes conjunctiva clear lids without ptosis, pupils equal and reactive to light accommodation, ears normal shape and contour, canals are clear bilaterally, tympanic membranes are benign, nasal mucosa benign, oropharynx is without erythema, edema or exudates.   Neck:  Supple without JVD or bruit. Thyroid is not enlarged.  Pulmonary:  Clear to ausculation.  Normal effort. No rales, ronchi, or wheezing.  Cardiovascular:  Regular rate and rhythm without murmur. Carotid and radial pulses are intact and equal bilaterally.  Abdomen:  Soft, nontender, nondistended. Normal bowel sounds. Liver and spleen are not palpable  Neurologic:  Grossly nonfocal  Lymph:  No cervical, supraclavicular or axillary lymph nodes are palpable  Skin:  Warm and dry. Multiple cysts as well as papules noted on patient's back between his shoulders, is very erythematous and consistent with cystic acne.  Musculoskeletal:  Normal gait. No extremity cyanosis, clubbing, or edema. Mild erythema and swelling noted at base of second toe and into left great toe joint. Painful to palpation, decreased range of motion in this toe.  Psych:  Normal mood and affect. Alert and oriented x3. Judgment and insight is normal.      Medical decision-making and discussion: Neil is a generally well-appearing 46-year-old male patient here today to follow-up on lab " results and discuss acne. With regard to acne plan is above. If this treatment is not effective plan to refer patient to dermatology. With regard to blood pressure patient states that home blood pressures within normal limits as such we will continue current therapy and monitor this condition. GFR was noted to be 57 on lab results. Patient states that he has never had change in his kidney function denies urinary symptoms including frequency burning, change in the color of his urine, plan to recheck this in 3 months and complete further workup if it does not improved. ALT is also noted to be elevated at 60 discussed possible reasons for elevation patient denies risk factors for hepatitis. States that he has never had any elevated liver enzymes. Plan to recheck this in 3 months as well. Triglycerides are also noted to be elevated at 222. Patient states that this is a chronic problem patient does not wish to take medications for this issue at this time. Patient states that since his move his diet has been very poor. Patient would like to improve diet and exercise and recheck this in 3 months this provider is amenable to that plan. As such follow-up labs are ordered for 3 months. He'll follow up at that time depending on results. Patient is encouraged to be seen in the emergency room for chest pain, palpitations, shortness of breath, dizziness, severe abdominal pain or other concerning symptoms.    Please note that this dictation was created using voice recognition software. I have made every reasonable attempt to correct obvious errors, but I expect that there are errors of grammar and possibly content that I did not discover before finalizing the note.    Assessment/Plan:  1. Chronic gout involving toe of left foot without tophus, unspecified cause  URIC ACID, SERUM    URIC ACID, SERUM    allopurinol (ZYLOPRIM) 100 MG Tab    allopurinol (ZYLOPRIM) 300 MG Tab   2. Acne vulgaris  doxycycline (VIBRAMYCIN) 100 MG Tab     tretinoin (RETIN-A) 0.025 % cream   3. Essential hypertension  COMP METABOLIC PANEL    lisinopril (PRINIVIL, ZESTRIL) 40 MG tablet    LIPID PROFILE   4. Hypogonadism in male     5. Mixed hyperlipidemia            I have placed the below orders and discussed them with an approved delegating provider. The MA is performing the below orders under the direction of Dr. Terry.

## 2017-03-08 NOTE — ASSESSMENT & PLAN NOTE
Lab Results   Component Value Date/Time    CHOLESTEROL, 02/17/2017 06:42 AM    * 02/17/2017 06:42 AM    HDL 41 02/17/2017 06:42 AM    TRIGLYCERIDES 222* 02/17/2017 06:42 AM       Lab Results   Component Value Date/Time    SODIUM 139 02/17/2017 06:42 AM    POTASSIUM 4.0 02/17/2017 06:42 AM    CHLORIDE 106 02/17/2017 06:42 AM    CO2 26 02/17/2017 06:42 AM    GLUCOSE 93 02/17/2017 06:42 AM    BUN 16 02/17/2017 06:42 AM    CREATININE 1.35 02/17/2017 06:42 AM     Lab Results   Component Value Date/Time    ALKALINE PHOSPHATASE 60 02/17/2017 06:42 AM    AST(SGOT) 38 02/17/2017 06:42 AM    ALT(SGPT) 60* 02/17/2017 06:42 AM    TOTAL BILIRUBIN 0.9 02/17/2017 06:42 AM

## 2017-03-08 NOTE — ASSESSMENT & PLAN NOTE
Chronic in nature. Stable. Last testosterone was 620. Patient he testosterone cypionate 120 mg weekly patient states this dosage controls his symptoms which include fatigue, irritation, weight gain. Patient states he feels well at this time and is not having side effects from the medication. Did discuss mildly elevated liver enzyme, we'll monitor this. Patient is not having any abdominal pain or other symptoms does not have any risk factors for hepatitis.

## 2017-03-08 NOTE — ASSESSMENT & PLAN NOTE
Chronic in nature. Last acute flare was approximately one month ago. Patient is not currently taking any suppressive therapy for this issue. Patient does use over-the-counter NSAIDs during acute flare which work well. Uric acid level 9.2. Plan to start allopurinol 100 mg daily and increase to 300 mg daily over the next month we'll recheck uric acid level in one month. If uric acid level is decreasing will continue 300 mg allopurinol over the next 2 months and recheck level again in June. Goal will be uric acid level less than 6. Up-to-date patient education provided regarding managing acute gout flare. Discussed side effects of allopurinol patient states all questions were answered at this time and he is amenable to trying this medication.

## 2017-03-08 NOTE — ASSESSMENT & PLAN NOTE
This is a new problem. Patient states that he is having severe cystic acne breakouts on his back. Patient states that he is bathing daily, keeping the area dry as best he can, and using over-the-counter topical products to reduce symptoms. Patient states that this is not effective. Patient states that this or painful, and is requesting treatment for acne at this time. Doxycycline 100 mg daily ×3 months prescribed at this time as well as topical retinoid. Patient is encouraged to follow up in one month to assess the progress of this treatment.

## 2017-03-09 ENCOUNTER — TELEPHONE (OUTPATIENT)
Dept: MEDICAL GROUP | Facility: PHYSICIAN GROUP | Age: 47
End: 2017-03-09

## 2017-03-21 ENCOUNTER — TELEPHONE (OUTPATIENT)
Dept: MEDICAL GROUP | Facility: PHYSICIAN GROUP | Age: 47
End: 2017-03-21

## 2017-03-21 NOTE — TELEPHONE ENCOUNTER
1. Caller Name: Neil Abdi                                         Call Back Number: [phone] (home)       Patient approves a detailed voicemail message: N\A    2. What are the patient's symptoms (location & severity)?  Difficulty swallowing and swelling of throat     3. Is this a new symptom No    4. When did it start? 3 days since he started uric acid medication     5. Action taken per Active Symptom Guide: Pt says the swelling and everything is not that bad but is noticable in throat pt  cant make it to urgent care or emergency  today due to training at work. He will See Dr. Viera 1st apt in the morning.     6. Patient agrees to recommended action per active symptom guide

## 2017-03-21 NOTE — TELEPHONE ENCOUNTER
----- Message from Your Healthcare Team sent at 3/21/2017  2:57 PM PDT -----  Regarding: Prescription Question  Contact: [phone]  Having with throat swelling, difficulty swallowing.  Feels like something stuck in back of throat.  Both Doxycycline and Allopurinol have this listed along with contact ...so I am contacting you.  :-)

## 2017-03-22 ENCOUNTER — OFFICE VISIT (OUTPATIENT)
Dept: MEDICAL GROUP | Facility: PHYSICIAN GROUP | Age: 47
End: 2017-03-22
Payer: COMMERCIAL

## 2017-03-22 VITALS
TEMPERATURE: 97 F | HEIGHT: 70 IN | OXYGEN SATURATION: 96 % | WEIGHT: 213.41 LBS | RESPIRATION RATE: 16 BRPM | BODY MASS INDEX: 30.55 KG/M2 | SYSTOLIC BLOOD PRESSURE: 110 MMHG | HEART RATE: 98 BPM | DIASTOLIC BLOOD PRESSURE: 82 MMHG

## 2017-03-22 DIAGNOSIS — L70.0 ACNE VULGARIS: ICD-10-CM

## 2017-03-22 DIAGNOSIS — T78.40XA ALLERGIC REACTION, INITIAL ENCOUNTER: ICD-10-CM

## 2017-03-22 DIAGNOSIS — M1A.9XX0 CHRONIC GOUT INVOLVING TOE OF LEFT FOOT WITHOUT TOPHUS, UNSPECIFIED CAUSE: ICD-10-CM

## 2017-03-22 PROCEDURE — 99214 OFFICE O/P EST MOD 30 MIN: CPT | Performed by: FAMILY MEDICINE

## 2017-03-22 RX ORDER — RANITIDINE 150 MG/1
150 TABLET ORAL 2 TIMES DAILY
Qty: 20 TAB | Refills: 0 | Status: SHIPPED | OUTPATIENT
Start: 2017-03-22 | End: 2019-06-10

## 2017-03-22 RX ORDER — PREDNISONE 20 MG/1
40 TABLET ORAL DAILY
Qty: 10 TAB | Refills: 0 | Status: SHIPPED | OUTPATIENT
Start: 2017-03-22 | End: 2017-05-02

## 2017-03-22 NOTE — MR AVS SNAPSHOT
"        Neil Abdi   3/22/2017 7:00 AM   Office Visit   MRN: [id number]    Department:  [facility]   Dept Phone:  [phone]    Description:  Male : 1970   Provider:  Olena Viera M.D.           Reason for Visit     Medication Reaction possibly allopurinol or doxycycline       Allergies as of 3/22/2017     No Known Allergies      You were diagnosed with     Allergic reaction, initial encounter   [027321]       Chronic gout involving toe of left foot without tophus, unspecified cause   [4337191]       Acne vulgaris   [368422]         Vital Signs     Blood Pressure Pulse Temperature Respirations Height Weight    110/82 mmHg 98 36.1 °C (97 °F) 16 1.778 m (5' 10\") 96.8 kg (213 lb 6.5 oz)    Body Mass Index Oxygen Saturation Smoking Status             30.62 kg/m2 96% Former Smoker         Basic Information     Date Of Birth Sex Race Ethnicity Preferred Language    1970 Male White Non- English      Problem List              ICD-10-CM Priority Class Noted - Resolved    Essential hypertension I10   2017 - Present    Gastroesophageal reflux disease without esophagitis K21.9   2017 - Present    Multiple allergies Z88.9   2017 - Present    Hypogonadism in male E29.1   2017 - Present    Exercise-induced asthma J45.990   2017 - Present    Acne vulgaris L70.0   3/7/2017 - Present    Chronic gout involving toe of left foot without tophus [id number]   3/7/2017 - Present    Mixed hyperlipidemia E78.2   3/7/2017 - Present      Health Maintenance        Date Due Completion Dates    IMM INFLUENZA (1) 2018 (Originally 2016) ---    IMM DTaP/Tdap/Td Vaccine (2 - Td) 2026            Current Immunizations     Tdap Vaccine 2016      Below and/or attached are the medications your provider expects you to take. Review all of your home medications and newly ordered medications with your provider and/or pharmacist. Follow medication instructions as directed " by your provider and/or pharmacist. Please keep your medication list with you and share with your provider. Update the information when medications are discontinued, doses are changed, or new medications (including over-the-counter products) are added; and carry medication information at all times in the event of emergency situations     Allergies:  No Known Allergies          Medications  Valid as of: March 22, 2017 -  7:19 AM    Generic Name Brand Name Tablet Size Instructions for use    Albuterol Sulfate   Inhale  by mouth.        Allopurinol (Tab) ZYLOPRIM 100 MG Take 1 Tab by mouth every day.        Allopurinol (Tab) ZYLOPRIM 300 MG Take 1 Tab by mouth every day.        Cetirizine HCl   Take  by mouth.        Doxycycline Hyclate (Tab) VIBRAMYCIN 100 MG Take 1 Tab by mouth every day.        Hydrocodone-Acetaminophen (Tab) NORCO 5-325 MG Take 1 Tab by mouth every 6 hours as needed.        Lisinopril (Tab) PRINIVIL, ZESTRIL 40 MG Take 1 Tab by mouth every day.        Omeprazole (CAPSULE DELAYED RELEASE) PRILOSEC 20 MG Take 1 Cap by mouth every day.        PredniSONE (Tab) DELTASONE 20 MG Take 2 Tabs by mouth every day.        RaNITidine HCl (Tab) ZANTAC 150 MG Take 1 Tab by mouth 2 times a day.        Testosterone Cypionate (Solution) DEPO-TESTOSTERONE 200 MG/ mg by Intramuscular route Once.        Tretinoin (Cream) RETIN-A 0.025 % Apply daily after cleansing.        .                 Medicines prescribed today were sent to:     [facility][id number] - [place] - [address] [place] [address]    Phone: [phone] Fax: [phone]    Open 24 Hours?: No      Medication refill instructions:       If your prescription bottle indicates you have medication refills left, it is not necessary to call your provider’s office. Please contact your pharmacy and they will refill your medication.    If your prescription bottle indicates you do not have any refills left, you may request refills at any time  through one of the following ways: The online [employer] system (except Urgent Care), by calling your provider’s office, or by asking your pharmacy to contact your provider’s office with a refill request. Medication refills are processed only during regular business hours and may not be available until the next business day. Your provider may request additional information or to have a follow-up visit with you prior to refilling your medication.   *Please Note: Medication refills are assigned a new Rx number when refilled electronically. Your pharmacy may indicate that no refills were authorized even though a new prescription for the same medication is available at the pharmacy. Please request the medicine by name with the pharmacy before contacting your provider for a refill.        Instructions    Allergies  An allergy is when your body reacts to a substance in a way that is not normal. An allergic reaction can happen after you:  · Eat something.  · Breathe in something.  · Touch something.  WHAT KINDS OF ALLERGIES ARE THERE?  You can be allergic to:  · Things that are only around during certain seasons, like molds and pollens.  · Foods.  · Drugs.  · Insects.  · Animal dander.  WHAT ARE SYMPTOMS OF ALLERGIES?  · Puffiness (swelling). This may happen on the lips, face, tongue, mouth, or throat.  · Sneezing.  · Coughing.  · Breathing loudly (wheezing).  · Stuffy nose.  · Tingling in the mouth.  · A rash.  · Itching.  · Itchy, red, puffy areas of skin (hives).  · Watery eyes.  · Throwing up (vomiting).  · Watery poop (diarrhea).  · Dizziness.  · Feeling faint or fainting.  · Trouble breathing or swallowing.  · A tight feeling in the chest.  · A fast heartbeat.  HOW ARE ALLERGIES DIAGNOSED?  Allergies can be diagnosed with:  · A medical and family history.  · Skin tests.  · Blood tests.  · A food diary. A food diary is a record of all the foods, drinks, and symptoms you have each day.  · The results of an elimination  diet. This diet involves making sure not to eat certain foods and then seeing what happens when you start eating them again.  HOW ARE ALLERGIES TREATED?  There is no cure for allergies, but allergic reactions can be treated with medicine. Severe reactions usually need to be treated at a hospital.   HOW CAN REACTIONS BE PREVENTED?  The best way to prevent an allergic reaction is to avoid the thing you are allergic to. Allergy shots and medicines can also help prevent reactions in some cases.     This information is not intended to replace advice given to you by your health care provider. Make sure you discuss any questions you have with your health care provider.     Document Released: 04/14/2014 Document Revised: 01/08/2016 Document Reviewed: 09/29/2015  [employer] Interactive Patient Education ©2016 [employer]              [employer] Access Code: Activation code not generated  Current [employer] Status: Active

## 2017-03-22 NOTE — PROGRESS NOTES
"Subjective:   Neil Abdi is a 46 y.o. male here today for possible allergic reaction.    Patient describes having a feeling of \"something in the back of my throat\" for approximately one week. He was recently started on medication for gout and acne. He tells me that the symptoms started almost immediately after taking these medications. Along with feeling like there was something in his throat, he felt like his throat was \"closing up on him.\" He noticed that the symptoms worsened over the weekend when he increased his allopurinol from 100 mg to 200 mg. Associated symptoms include feeling short of breath. He denies any fevers, chills, rash, wheezing or respiratory distress. He has not been seen at an urgent care or emergency room. He has been able to work, sleep and eat.    He stopped the medications this morning. He has not noticed much improvement. Does have a history of allergies, but tells me that this feels \"different.\" He has taken over-the-counter Zyrtec or Allegra without change in symptoms.    Current medicines (including changes today)  Current Outpatient Prescriptions   Medication Sig Dispense Refill   • ranitidine (ZANTAC) 150 MG Tab Take 1 Tab by mouth 2 times a day. 20 Tab 0   • predniSONE (DELTASONE) 20 MG Tab Take 2 Tabs by mouth every day. 10 Tab 0   • allopurinol (ZYLOPRIM) 100 MG Tab Take 1 Tab by mouth every day. (Patient taking differently: Take 100 mg by mouth 2 times a day.) 28 Tab 0   • lisinopril (PRINIVIL, ZESTRIL) 40 MG tablet Take 1 Tab by mouth every day. 90 Tab 3   • doxycycline (VIBRAMYCIN) 100 MG Tab Take 1 Tab by mouth every day. 90 Tab 0   • tretinoin (RETIN-A) 0.025 % cream Apply daily after cleansing. 45 g 3   • ALBUTEROL SULFATE HFA INH Inhale  by mouth.     • omeprazole (PRILOSEC) 20 MG delayed-release capsule Take 1 Cap by mouth every day. 90 Cap 3   • Cetirizine HCl (ZYRTEC ALLERGY PO) Take  by mouth.     • testosterone cypionate (DEPO-TESTOSTERONE) 200 MG/ML " "Solution injection 120 mg by Intramuscular route Once.     • hydrocodone-acetaminophen (NORCO) 5-325 MG Tab per tablet Take 1 Tab by mouth every 6 hours as needed. 12 Tab 0   • allopurinol (ZYLOPRIM) 300 MG Tab Take 1 Tab by mouth every day. 70 Tab 0     No current facility-administered medications for this visit.     He  has a past medical history of Asthma; Anxiety; GERD (gastroesophageal reflux disease); Hypertension; and Migraine.    ROS   See above. No fever, no chest pain, no abdominal pain.     Objective:     Physical Exam:  Blood pressure 110/82, pulse 98, temperature 36.1 °C (97 °F), resp. rate 16, height 1.778 m (5' 10\"), weight 96.8 kg (213 lb 6.5 oz), SpO2 96 %. Body mass index is 30.62 kg/(m^2).   Constitutional: Alert, healthy-appearing male in no distress. Non-toxic appearance.  Skin: Warm, dry, good turgor, no rashes in visible areas.  Eye: Equal, round and reactive, conjunctiva clear, lids normal.  ENMT: Lips without lesions, good dentition, oropharynx clear without swelling.  Neck: Trachea midline, no masses, no thyromegaly. No cervical or supraclavicular lymphadenopathy.  Respiratory: Unlabored respiratory effort, lungs clear to auscultation, no wheezes, no ronchi.  Cardiovascular: Normal S1, S2, no murmur, no edema.  Psych: Alert and oriented x3, normal affect and mood.    Assessment and Plan:     1. Allergic reaction, initial encounter  Symptoms and timing are concerning for a medication-induced allergic reaction. It is difficult to say which of the two medications may have caused the reaction. Other differentials to consider are allergic rhinitis or uncontrolled GERD. Adverse effect profiles of doxycyline and allopurinol reviewed. Dyspnea and angioedema are rare, occuring in <1% of patients. No interactions found between these medications or any others on patient's medication list. His exam today is reassuring and there are no signs of respiratory distress. I recommended he discontinue the " medications for now. Will treat allergic reaction with Zyrtec, Zantac and steroid burst. Strict return and ER precautions given.   - ranitidine (ZANTAC) 150 MG Tab; Take 1 Tab by mouth 2 times a day.  Dispense: 20 Tab; Refill: 0  - predniSONE (DELTASONE) 20 MG Tab; Take 2 Tabs by mouth every day.  Dispense: 10 Tab; Refill: 0    2. Chronic gout involving toe of left foot without tophus, unspecified cause  No current symptoms. Hold off on allopurinol for now given possible allergic reaction. May consider restarting allopurinol in the future or trial of febuxostat. Strongly urged patient to make dietary changes and avoid alcohol.    3. Acne vulgaris  Stable. Hold off on doxycycline for now given possible allergic reaction. Encouraged patient to focus on topical treatments in the meantime.    Followup: Return if symptoms worsen or fail to improve.         PLEASE NOTE: This dictation was created using voice recognition software. I have made every reasonable attempt to correct obvious errors, but I expect that there are errors of grammar and possibly content that I did not discover before finalizing the note.

## 2017-03-22 NOTE — PATIENT INSTRUCTIONS
Allergies  An allergy is when your body reacts to a substance in a way that is not normal. An allergic reaction can happen after you:  · Eat something.  · Breathe in something.  · Touch something.  WHAT KINDS OF ALLERGIES ARE THERE?  You can be allergic to:  · Things that are only around during certain seasons, like molds and pollens.  · Foods.  · Drugs.  · Insects.  · Animal dander.  WHAT ARE SYMPTOMS OF ALLERGIES?  · Puffiness (swelling). This may happen on the lips, face, tongue, mouth, or throat.  · Sneezing.  · Coughing.  · Breathing loudly (wheezing).  · Stuffy nose.  · Tingling in the mouth.  · A rash.  · Itching.  · Itchy, red, puffy areas of skin (hives).  · Watery eyes.  · Throwing up (vomiting).  · Watery poop (diarrhea).  · Dizziness.  · Feeling faint or fainting.  · Trouble breathing or swallowing.  · A tight feeling in the chest.  · A fast heartbeat.  HOW ARE ALLERGIES DIAGNOSED?  Allergies can be diagnosed with:  · A medical and family history.  · Skin tests.  · Blood tests.  · A food diary. A food diary is a record of all the foods, drinks, and symptoms you have each day.  · The results of an elimination diet. This diet involves making sure not to eat certain foods and then seeing what happens when you start eating them again.  HOW ARE ALLERGIES TREATED?  There is no cure for allergies, but allergic reactions can be treated with medicine. Severe reactions usually need to be treated at a hospital.   HOW CAN REACTIONS BE PREVENTED?  The best way to prevent an allergic reaction is to avoid the thing you are allergic to. Allergy shots and medicines can also help prevent reactions in some cases.     This information is not intended to replace advice given to you by your health care provider. Make sure you discuss any questions you have with your health care provider.     Document Released: 04/14/2014 Document Revised: 01/08/2016 Document Reviewed: 09/29/2015  [employer] Interactive Patient Education ©2016  [employer] Inc.

## 2017-05-01 ENCOUNTER — PATIENT MESSAGE (OUTPATIENT)
Dept: MEDICAL GROUP | Facility: PHYSICIAN GROUP | Age: 47
End: 2017-05-01

## 2017-05-02 ENCOUNTER — OFFICE VISIT (OUTPATIENT)
Dept: MEDICAL GROUP | Facility: PHYSICIAN GROUP | Age: 47
End: 2017-05-02
Payer: COMMERCIAL

## 2017-05-02 VITALS
HEART RATE: 88 BPM | BODY MASS INDEX: 28.92 KG/M2 | TEMPERATURE: 98.1 F | DIASTOLIC BLOOD PRESSURE: 88 MMHG | WEIGHT: 202 LBS | RESPIRATION RATE: 16 BRPM | OXYGEN SATURATION: 97 % | HEIGHT: 70 IN | SYSTOLIC BLOOD PRESSURE: 138 MMHG

## 2017-05-02 DIAGNOSIS — Z88.9 MULTIPLE ALLERGIES: ICD-10-CM

## 2017-05-02 PROCEDURE — 99214 OFFICE O/P EST MOD 30 MIN: CPT | Performed by: NURSE PRACTITIONER

## 2017-05-02 RX ORDER — TRIAMCINOLONE ACETONIDE 40 MG/ML
40 INJECTION, SUSPENSION INTRA-ARTICULAR; INTRAMUSCULAR ONCE
Status: COMPLETED | OUTPATIENT
Start: 2017-05-02 | End: 2017-05-02

## 2017-05-02 RX ADMIN — TRIAMCINOLONE ACETONIDE 40 MG: 40 INJECTION, SUSPENSION INTRA-ARTICULAR; INTRAMUSCULAR at 16:59

## 2017-05-02 ASSESSMENT — PAIN SCALES - GENERAL: PAINLEVEL: NO PAIN

## 2017-05-02 NOTE — MR AVS SNAPSHOT
"        Neil Abdi   2017 4:40 PM   Office Visit   MRN: [id number]    Department:  [facility]   Dept Phone:  [phone]    Description:  Male : 1970   Provider:  ARA Leggett           Reason for Visit     Other Allergies       Allergies as of 2017     No Known Allergies      You were diagnosed with     Multiple allergies   [[id number]]         Vital Signs     Blood Pressure Pulse Temperature Respirations Height Weight    138/88 mmHg 88 36.7 °C (98.1 °F) 16 1.778 m (5' 10\") 91.627 kg (202 lb)    Body Mass Index Oxygen Saturation Smoking Status             28.98 kg/m2 97% Former Smoker         Basic Information     Date Of Birth Sex Race Ethnicity Preferred Language    1970 Male White Non- English      Problem List              ICD-10-CM Priority Class Noted - Resolved    Essential hypertension I10   2017 - Present    Gastroesophageal reflux disease without esophagitis K21.9   2017 - Present    Multiple allergies Z88.9   2017 - Present    Hypogonadism in male E29.1   2017 - Present    Exercise-induced asthma J45.990   2017 - Present    Acne vulgaris L70.0   3/7/2017 - Present    Chronic gout involving toe of left foot without tophus [id number].[id number]   3/7/2017 - Present    Mixed hyperlipidemia E78.2   3/7/2017 - Present      Health Maintenance        Date Due Completion Dates    IMM DTaP/Tdap/Td Vaccine (2 - Td) 2026            Current Immunizations     Tdap Vaccine 2016      Below and/or attached are the medications your provider expects you to take. Review all of your home medications and newly ordered medications with your provider and/or pharmacist. Follow medication instructions as directed by your provider and/or pharmacist. Please keep your medication list with you and share with your provider. Update the information when medications are discontinued, doses are changed, or new medications (including " over-the-counter products) are added; and carry medication information at all times in the event of emergency situations     Allergies:  No Known Allergies          Medications  Valid as of: May 02, 2017 -  4:58 PM    Generic Name Brand Name Tablet Size Instructions for use    Albuterol Sulfate   Inhale  by mouth.        Cetirizine HCl   Take  by mouth.        Hydrocodone-Acetaminophen (Tab) NORCO 5-325 MG Take 1 Tab by mouth every 6 hours as needed.        Lisinopril (Tab) PRINIVIL, ZESTRIL 40 MG Take 1 Tab by mouth every day.        Omeprazole (CAPSULE DELAYED RELEASE) PRILOSEC 20 MG Take 1 Cap by mouth every day.        RaNITidine HCl (Tab) ZANTAC 150 MG Take 1 Tab by mouth 2 times a day.        Testosterone Cypionate (Solution) DEPO-TESTOSTERONE 200 MG/ mg by Intramuscular route Once.        Tretinoin (Cream) RETIN-A 0.025 % Apply daily after cleansing.        .                 Medicines prescribed today were sent to:     [facility][id number] - [place] - [address] [place] [address]    Phone: [phone] Fax: [phone]    Open 24 Hours?: No      Medication refill instructions:       If your prescription bottle indicates you have medication refills left, it is not necessary to call your provider’s office. Please contact your pharmacy and they will refill your medication.    If your prescription bottle indicates you do not have any refills left, you may request refills at any time through one of the following ways: The online [employer] system (except Urgent Care), by calling your provider’s office, or by asking your pharmacy to contact your provider’s office with a refill request. Medication refills are processed only during regular business hours and may not be available until the next business day. Your provider may request additional information or to have a follow-up visit with you prior to refilling your medication.   *Please Note: Medication refills are assigned a new Rx number when  refilled electronically. Your pharmacy may indicate that no refills were authorized even though a new prescription for the same medication is available at the pharmacy. Please request the medicine by name with the pharmacy before contacting your provider for a refill.           Frediohart Access Code: Activation code not generated  Current PayEase Status: Active

## 2017-05-03 NOTE — PROGRESS NOTES
Chief Complaint   Patient presents with   • Other     Allergies        HISTORY OF PRESENT ILLNESS: Patient is a 46 y.o. male established patient who presents today to discuss allergies.    Multiple allergies  Chronic in nature. Worsening. Patient states that over the last several months he has had severe throat irritation including postnasal drainage, coughing up sputum, states that he has been using over-the-counter Claritin and Flonase which has improved his symptoms but patient continues to experience throat irritation, cough, post-nasal drainage and fatigue. Patient states that he would like to request a Kenalog shot as he has heard a lot of things about these working well. Did discuss possible side effects of Kenalog shot including immune suppression, dizziness, agitation, changes in heart rate, depression, osteonecrosis, increase in blemishes, thinning of hair scalp, decrease in bone density. Patient states that he accepts the risk of side effects and would like to proceed with Kenalog injection.      Patient Active Problem List    Diagnosis Date Noted   • Acne vulgaris 03/07/2017   • Chronic gout involving toe of left foot without tophus 03/07/2017   • Mixed hyperlipidemia 03/07/2017   • Essential hypertension 02/16/2017   • Gastroesophageal reflux disease without esophagitis 02/16/2017   • Multiple allergies 02/16/2017   • Hypogonadism in male 02/16/2017   • Exercise-induced asthma 02/16/2017       Allergies:Review of patient's allergies indicates no known allergies.    Current Outpatient Prescriptions   Medication Sig Dispense Refill   • lisinopril (PRINIVIL, ZESTRIL) 40 MG tablet Take 1 Tab by mouth every day. 90 Tab 3   • omeprazole (PRILOSEC) 20 MG delayed-release capsule Take 1 Cap by mouth every day. 90 Cap 3   • ranitidine (ZANTAC) 150 MG Tab Take 1 Tab by mouth 2 times a day. 20 Tab 0   • tretinoin (RETIN-A) 0.025 % cream Apply daily after cleansing. 45 g 3   • ALBUTEROL SULFATE HFA INH Inhale  by  mouth.     • Cetirizine HCl (ZYRTEC ALLERGY PO) Take  by mouth.     • testosterone cypionate (DEPO-TESTOSTERONE) 200 MG/ML Solution injection 120 mg by Intramuscular route Once.     • hydrocodone-acetaminophen (NORCO) 5-325 MG Tab per tablet Take 1 Tab by mouth every 6 hours as needed. 12 Tab 0     No current facility-administered medications for this visit.       Social History   Substance Use Topics   • Smoking status: Former Smoker     Quit date: 2002   • Smokeless tobacco: Former User   • Alcohol Use: 1.8 oz/week     1 Glasses of wine, 1 Cans of beer, 1 Shots of liquor per week      Comment: weekly       Family Status   Relation Status Death Age   • Mother     • Father Alive    • Sister Alive    • Sister Alive    • Sister Alive      Family History   Problem Relation Age of Onset   • Cancer Mother      colon   • No Known Problems Father    • No Known Problems Sister    • No Known Problems Sister    • No Known Problems Sister        Review of Systems:   Constitutional:  Negative for fever, chills, weight loss and malaise/fatigue.   HEENT:  Negative for ear pain, nosebleeds, positive congestion, sore throat and negative neck pain.    Eyes:  Negative for blurred vision.   Respiratory: Positive for cough, sputum production, negative shortness of breath and wheezing.    Cardiovascular:  Negative for chest pain, palpitations, orthopnea and leg swelling.   Gastrointestinal:  Negative for heartburn, nausea, vomiting and abdominal pain.   Genitourinary:  Negative for dysuria, urgency and frequency.   Musculoskeletal:  Negative for myalgias, back pain and joint pain.   Skin:  Negative for rash and itching.   Neurological:  Negative for dizziness, tingling, tremors, sensory change, focal weakness and headaches.   Endo/Heme/Allergies:  Does not bruise/bleed easily.   Psychiatric/Behavioral:  Negative for depression, suicidal ideas and memory loss.  The patient is not nervous/anxious and does not have insomnia.   "  All other systems reviewed and are negative except as in HPI.    Exam:  Blood pressure 138/88, pulse 88, temperature 36.7 °C (98.1 °F), resp. rate 16, height 1.778 m (5' 10\"), weight 91.627 kg (202 lb), SpO2 97 %.  General:  Normal appearing. No distress.  HEENT:  Normocephalic. Eyes conjunctiva clear lids without ptosis, pupils equal and reactive to light accommodation, ears normal shape and contour, canals are clear bilaterally, tympanic membranes are benign, nasal mucosa pale and boggy, oropharynx is erythematous without edema or exudate is of cobblestoning is noted.  Neck:  Supple without JVD or bruit. Thyroid is not enlarged.  Pulmonary:  Clear to ausculation.  Normal effort. No rales, ronchi, or wheezing.  Cardiovascular:  Regular rate and rhythm without murmur. Carotid and radial pulses are intact and equal bilaterally.  Abdomen:  Soft, nontender, nondistended. Normal bowel sounds. Liver and spleen are not palpable  Neurologic:  Grossly nonfocal  Lymph:  No cervical, supraclavicular or axillary lymph nodes are palpable  Skin:  Warm and dry.  No obvious lesions.  Musculoskeletal:  Normal gait. No extremity cyanosis, clubbing, or edema.  Psych:  Normal mood and affect. Alert and oriented x3. Judgment and insight is normal.      Medical decision-making and discussion: Neil is a generally well-appearing 46-year-old male patient here today requesting Kenalog shot. Patient has a history of severe seasonal allergies and has tried other modalities without success. Patient has not had a Kenalog shot in the past. I discussed the benefits, risks and alternatives to Kenalog shot. Discussed the Kenalog shot may provide very little or no relief. I also advised patient that Kenalog shots are not standard of care any longer. Remote concern for osteonecrosis the hip was discussed. Patient wishes to proceed. Patient tolerated Kenalog shot well. Patient will follow up as needed patient will get in touch with this provider via " my chart regarding allopurinol and other medications. Patient is encouraged to be seen in the emergency room for chest pain, palpitations, shortness of breath, dizziness, severe abdominal pain or other concerning symptoms.      Please note that this dictation was created using voice recognition software. I have made every reasonable attempt to correct obvious errors, but I expect that there are errors of grammar and possibly content that I did not discover before finalizing the note.    Assessment/Plan:  1. Multiple allergies  triamcinolone acetonide (KENALOG-40) injection 40 mg          I have placed the below orders and discussed them with an approved delegating provider. The MA is performing the below orders under the direction of Dr. Terry.

## 2017-05-03 NOTE — ASSESSMENT & PLAN NOTE
Chronic in nature. Worsening. Patient states that over the last several months he has had severe throat irritation including postnasal drainage, coughing up sputum, states that he has been using over-the-counter Claritin and Flonase which has improved his symptoms but patient continues to experience throat irritation, cough, post-nasal drainage and fatigue. Patient states that he would like to request a Kenalog shot as he has heard a lot of things about these working well. Did discuss possible side effects of Kenalog shot including immune suppression, dizziness, agitation, changes in heart rate, depression, osteonecrosis, increase in blemishes, thinning of hair scalp, decrease in bone density. Patient states that he accepts the risk of side effects and would like to proceed with Kenalog injection.

## 2017-07-03 NOTE — TELEPHONE ENCOUNTER
Was the patient seen in the last year in this department? Yes     Does patient have an active prescription for medications requested? No     Received Request Via: Patient

## 2017-07-05 RX ORDER — TESTOSTERONE CYPIONATE 200 MG/ML
120 INJECTION, SOLUTION INTRAMUSCULAR ONCE
Qty: 1 ML | Refills: 0 | Status: SHIPPED | OUTPATIENT
Start: 2017-07-05 | End: 2017-07-05

## 2017-07-10 DIAGNOSIS — E29.1 HYPOGONADISM IN MALE: ICD-10-CM

## 2017-07-10 RX ORDER — TESTOSTERONE CYPIONATE 200 MG/ML
120 INJECTION, SOLUTION INTRAMUSCULAR
Qty: 10 ML | Refills: 0 | Status: SHIPPED | OUTPATIENT
Start: 2017-07-10 | End: 2017-10-24 | Stop reason: SDUPTHER

## 2017-07-19 ENCOUNTER — HOSPITAL ENCOUNTER (EMERGENCY)
Facility: MEDICAL CENTER | Age: 47
End: 2017-07-19
Attending: EMERGENCY MEDICINE
Payer: COMMERCIAL

## 2017-07-19 ENCOUNTER — APPOINTMENT (OUTPATIENT)
Dept: RADIOLOGY | Facility: MEDICAL CENTER | Age: 47
End: 2017-07-19
Attending: EMERGENCY MEDICINE
Payer: COMMERCIAL

## 2017-07-19 VITALS
TEMPERATURE: 97.3 F | DIASTOLIC BLOOD PRESSURE: 76 MMHG | HEIGHT: 70 IN | OXYGEN SATURATION: 100 % | HEART RATE: 80 BPM | WEIGHT: 205 LBS | RESPIRATION RATE: 17 BRPM | BODY MASS INDEX: 29.35 KG/M2 | SYSTOLIC BLOOD PRESSURE: 153 MMHG

## 2017-07-19 DIAGNOSIS — M54.50 ACUTE LEFT-SIDED LOW BACK PAIN WITHOUT SCIATICA: ICD-10-CM

## 2017-07-19 PROCEDURE — 96375 TX/PRO/DX INJ NEW DRUG ADDON: CPT

## 2017-07-19 PROCEDURE — 99285 EMERGENCY DEPT VISIT HI MDM: CPT

## 2017-07-19 PROCEDURE — 72100 X-RAY EXAM L-S SPINE 2/3 VWS: CPT

## 2017-07-19 PROCEDURE — 96374 THER/PROPH/DIAG INJ IV PUSH: CPT

## 2017-07-19 PROCEDURE — 700111 HCHG RX REV CODE 636 W/ 250 OVERRIDE (IP): Performed by: EMERGENCY MEDICINE

## 2017-07-19 RX ORDER — OXYCODONE HYDROCHLORIDE AND ACETAMINOPHEN 5; 325 MG/1; MG/1
1-2 TABLET ORAL EVERY 6 HOURS PRN
Qty: 15 TAB | Refills: 0 | Status: SHIPPED | OUTPATIENT
Start: 2017-07-19 | End: 2018-04-19

## 2017-07-19 RX ORDER — CYCLOBENZAPRINE HCL 10 MG
10 TABLET ORAL 3 TIMES DAILY PRN
Qty: 15 TAB | Refills: 0 | Status: SHIPPED | OUTPATIENT
Start: 2017-07-19 | End: 2018-04-19

## 2017-07-19 RX ORDER — MORPHINE SULFATE 10 MG/ML
10 INJECTION, SOLUTION INTRAMUSCULAR; INTRAVENOUS ONCE
Status: COMPLETED | OUTPATIENT
Start: 2017-07-19 | End: 2017-07-19

## 2017-07-19 RX ORDER — KETOROLAC TROMETHAMINE 30 MG/ML
30 INJECTION, SOLUTION INTRAMUSCULAR; INTRAVENOUS ONCE
Status: COMPLETED | OUTPATIENT
Start: 2017-07-19 | End: 2017-07-19

## 2017-07-19 RX ORDER — METHYLPREDNISOLONE 4 MG/1
TABLET ORAL
Qty: 1 KIT | Refills: 0 | Status: SHIPPED | OUTPATIENT
Start: 2017-07-19 | End: 2018-04-19

## 2017-07-19 RX ADMIN — MORPHINE SULFATE 10 MG: 10 INJECTION INTRAVENOUS at 20:02

## 2017-07-19 RX ADMIN — KETOROLAC TROMETHAMINE 30 MG: 30 INJECTION, SOLUTION INTRAMUSCULAR; INTRAVENOUS at 20:02

## 2017-07-19 ASSESSMENT — LIFESTYLE VARIABLES: DO YOU DRINK ALCOHOL: NO

## 2017-07-19 ASSESSMENT — PAIN SCALES - GENERAL: PAINLEVEL_OUTOF10: 10

## 2017-07-19 NOTE — LETTER
[facility], EMERGENCY DEPT  [facility] [facility] [address]  [phone]     July 19, 2017    Patient: Neil Abdi   YOB: 1970   Date of Visit: 7/19/2017       To Whom It May Concern:    Neil Abdi was seen and treated in our department on 7/19/2017. He is to have light duty at work 7/19/17-7/27/17 and not to lift anything greater than 10 lbs.           Sincerely,           Emma Tyler R.N.

## 2017-07-19 NOTE — ED AVS SNAPSHOT
Schrodinger Access Code: Activation code not generated  Current Schrodinger Status: Active    WorkThinkhart  A secure, online tool to manage your health information     [employer]’s Schrodinger® is a secure, online tool that connects you to your personalized health information from the privacy of your home -- day or night - making it very easy for you to manage your healthcare. Once the activation process is completed, you can even access your medical information using the Schrodinger angie, which is available for free in the Apple Angie store or Google Play store.     Schrodinger provides the following levels of access (as shown below):   My Chart Features   [facility] Primary Care Doctor [facility]  Specialists [facility]  Urgent  Care Non-[facility]  Primary Care  Doctor   Email your healthcare team securely and privately 24/7 X X X X   Manage appointments: schedule your next appointment; view details of past/upcoming appointments X      Request prescription refills. X      View recent personal medical records, including lab and immunizations X X X X   View health record, including health history, allergies, medications X X X X   Read reports about your outpatient visits, procedures, consult and ER notes X X X X   See your discharge summary, which is a recap of your hospital and/or ER visit that includes your diagnosis, lab results, and care plan. X X       How to register for Schrodinger:  1. Go to  [email or web address].  2. Click on the Sign Up Now box, which takes you to the New Member Sign Up page. You will need to provide the following information:  a. Enter your Schrodinger Access Code exactly as it appears at the top of this page. (You will not need to use this code after you’ve completed the sign-up process. If you do not sign up before the expiration date, you must request a new code.)   b. Enter your date of birth.   c. Enter your home email address.   d. Click Submit, and follow the next screen’s instructions.  3. Create a Schrodinger ID. This will  be your YouGift login ID and cannot be changed, so think of one that is secure and easy to remember.  4. Create a YouGift password. You can change your password at any time.  5. Enter your Password Reset Question and Answer. This can be used at a later time if you forget your password.   6. Enter your e-mail address. This allows you to receive e-mail notifications when new information is available in YouGift.  7. Click Sign Up. You can now view your health information.    For assistance activating your YouGift account, call [phone]

## 2017-07-19 NOTE — ED AVS SNAPSHOT
Home Care Instructions                                                                                                                Neil Abdi   MRN: [id number]    Department:  [facility], Emergency Dept   Date of Visit:  7/19/2017            [facility], Emergency Dept    [address]    [place] [address]    Phone:  [phone]      You were seen by     John Escamilla M.D.      Your Diagnosis Was     Acute left-sided low back pain without sciatica     M54.5       These are the medications you received during your hospitalization from 07/19/2017 1823 to 07/19/2017 2112     Date/Time Order Dose Route Action    07/19/2017 2002 ketorolac (TORADOL) injection 30 mg 30 mg Intravenous Given    07/19/2017 2002 morphine (pf) 10 mg/ml injection 10 mg 10 mg Intravenous Given      Follow-up Information     1. Follow up with Shen Alexander M.D.. Schedule an appointment as soon as possible for a visit in 1 week.    Specialty:  Neurosurgery    Why:  As needed    Contact information    [address]  [address] [address]  [place] [address] [phone]        Medication Information     Review all of your home medications and newly ordered medications with your primary doctor and/or pharmacist as soon as possible. Follow medication instructions as directed by your doctor and/or pharmacist.     Please keep your complete medication list with you and share with your physician. Update the information when medications are discontinued, doses are changed, or new medications (including over-the-counter products) are added; and carry medication information at all times in the event of emergency situations.               Medication List      START taking these medications        Instructions    Morning Afternoon Evening Bedtime    cyclobenzaprine 10 MG Tabs   Commonly known as:  FLEXERIL        Take 1 Tab by mouth 3 times a day as needed for Muscle Spasms.   Dose:  10 mg                           MethylPREDNISolone 4 MG Tbpk   Commonly known as:  MEDROL DOSEPAK        Sig: Per instructions on pack                        oxycodone-acetaminophen 5-325 MG Tabs   Commonly known as:  PERCOCET        Take 1-2 Tabs by mouth every 6 hours as needed (moderate to severe pain).   Dose:  1-2 Tab                          ASK your doctor about these medications        Instructions    Morning Afternoon Evening Bedtime    ALBUTEROL SULFATE HFA INH        Inhale  by mouth.                        hydrocodone-acetaminophen 5-325 MG Tabs per tablet   Commonly known as:  NORCO        Take 1 Tab by mouth every 6 hours as needed.   Dose:  1 Tab                        lisinopril 40 MG tablet   Commonly known as:  PRINIVIL, ZESTRIL        Take 1 Tab by mouth every day.   Dose:  40 mg                        omeprazole 20 MG delayed-release capsule   Commonly known as:  PRILOSEC        Take 1 Cap by mouth every day.   Dose:  20 mg                        ranitidine 150 MG Tabs   Commonly known as:  ZANTAC        Take 1 Tab by mouth 2 times a day.   Dose:  150 mg                        testosterone cypionate 200 MG/ML Soln injection   Commonly known as:  DEPO-TESTOSTERONE        0.6 mL by Intramuscular route every 7 days.   Dose:  120 mg                        tretinoin 0.025 % cream   Commonly known as:  RETIN-A        Apply daily after cleansing.                        ZYRTEC ALLERGY PO        Take  by mouth.                             Where to Get Your Medications      These medications were sent to [id number] - [place] - [address]  [address] [place] [address] [place] [address]     Phone:  [phone]    - cyclobenzaprine 10 MG Tabs  - MethylPREDNISolone 4 MG Tbpk      You can get these medications from any pharmacy     Bring a paper prescription for each of these medications    - oxycodone-acetaminophen 5-325 MG Tabs            Procedures and tests performed during your visit     DX-LUMBAR SPINE-2 OR 3 VIEWS        Discharge  Instructions         Back Pain & Injury    Take ibuprofen 800 mg every 6 hours or naproxen 500 mg every 12 hours for 5-7 days. Start Medrol instead of naproxen if not improving in 5 days.  Followup if not better 7-10 days.  Return for weakness or fever.  Avoid lifting more than 10 pounds and avoid bending.  Avoid strict bedrest.    Your back pain is most likely caused by a strain of the muscles or ligaments that support the spine.  This is a  common injury. Back strains cause pain and trouble moving because of muscle spasms. They may take several weeks to heal, although they are usually much better after 2-3 days.     Your spinal column (backbone) is made up of 24 main vertebral bodies in addition to the sacrum and coccyx. These are held together by tough fibrous tissue called ligaments, and also by the support of your muscles. Nerve roots pass through the openings between the vertebrae. A sudden wrenching move or injury to the back may cause injury to, or pressure upon these nerves. This may result in localized back pain or radiation (movement) of pain into the buttocks and down the leg into the foot. The condition known as sciatica is frequently associated with a ruptured (herniated) disc. Pain is also created by muscle spasm alone.    Treatment for back pain includes:  l Rest - Get bed rest as needed over the next day or two.  Use a firm mattress and lie on your side with your knees slightly bent. If you lie on your back, put a pillow under your knees.  Use bed rest for only the most extreme, acute (sudden) episode. Prolonged bed rest over 48 hours will aggravate your condition.  l Early movement - Back pain improves most rapidly if you remain active. It is much more stressful on the back to sit or  one place than it is to move around.  Do not sit, drive or  one place for more than 30 minutes at a time.  Take short walks on level surfaces as soon as pain will allow.  l Limit bending and lifting -  Don't bend over or lift anything over 20 pounds until you are completely better. Learn to lift by bending your knees and using your leg muscles to help. Keep the load close to your body and avoid twisting, reaching and overhead work.     l Medicines - Medicine to reduce pain and inflammation are helpful and muscle-relaxing drugs may also be prescribed.  l Therapy - Ice used for acute conditions is very effective. Use a large plastic bag filled with ice and wrapped in a towel. This also provides excellent pain relief. This may be continuous or for thirty minutes every two hours during acute phase, then as needed. Heat for thirty minutes prior to activities is helpful.  Back exercises and gentle massage may be of some benefit.You should be examined again if your back pain is not better in one week.     TO PREVENT:  Avoid an underactive life style. Active exercise, as directed by your caregiver, is your greatest weapon against back pain. Hard physical activities such as tennis, racquetball, water skiing etc., without proper physical conditioning may aggravate and/or create problems, especially if you are not in condition for that activity. If you have a back problem it is especially important to avoid sports requiring sudden body movements. Swimming and walking are generally safer activities. Maintain good posture. Avoid obesity.    See your caregiver for continued problems. Your caregiver can help or refer you for appropriate exercises and work hardening. Work hardening means that the back is put through the proper exercises and rehabilitation to treat the present problems and prevent future problems. With conditioning, most back problems can be avoided. Sometimes a more serious issue may be the cause of back pain and you should be seen immediately again if new problems seem to be developing.    seek immediate medical attention if:  Ø Numbness, tingling, weakness, or problem with the use of your arms or  legs.  Ø Severe back pain not relieved with medications.  Ø Change in bowel or bladder control.  Ø Increasing pain in any areas of the body.  Ø Shortness of breath, dizziness or fainting.  Ø Nausea (feeling sick to your stomach), vomiting or sweats.  Ø Fever above 101º    ExitCare® Patient Information ©2007 [employer].              Patient Information     Patient Information    Following emergency treatment: all patient requiring follow-up care must return either to a private physician or a clinic if your condition worsens before you are able to obtain further medical attention, please return to the emergency room.     Billing Information    At [facility], we work to make the billing process streamlined for our patients.  Our Representatives are here to answer any questions you may have regarding your hospital bill.  If you have insurance coverage and have supplied your insurance information to us, we will submit a claim to your insurer on your behalf.  Should you have any questions regarding your bill, we can be reached online or by phone as follows:  Online: You are able pay your bills online or live chat with our representatives about any billing questions you may have. We are here to help Monday - Friday from 8:00am to 7:30pm and 9:00am - 12:00pm on Saturdays.  Please visit [email or web address]  for more information.   Phone:  [phone] or [phone]    Please note that your emergency physician, surgeon, pathologist, radiologist, anesthesiologist, and other specialists are not employed by [facility] and will therefore bill separately for their services.  Please contact them directly for any questions concerning their bills at the numbers below:     Emergency Physician Services:  [phone]  [facility]:  [phone]  [facility]:  [phone]  [facility]:  [phone]    1. Your final bill may vary from the amount  quoted upon discharge if all procedures are not complete at that time, or if your doctor has additional procedures of which we are not aware. You will receive an additional bill if you return to the Emergency Department at [facility] for suture removal regardless of the facility of which the sutures were placed.     2. Please arrange for settlement of this account at the emergency registration.    3. All self-pay accounts are due in full at the time of treatment.  If you are unable to meet this obligation then payment is expected within 4-5 days.     4. If you have had radiology studies (CT, X-ray, Ultrasound, MRI), you have received a preliminary result during your emergency department visit. Please contact the radiology department [phone] to receive a copy of your final result. Please discuss the Final result with your primary physician or with the follow up physician provided.     Crisis Hotline:  [place] Crisis Hotline:  2-250-LZBWZGZ or 1-393.468.6146  [place] Crisis Hotline:    1-985.102.2436 or 579-627-6435         ED Discharge Follow Up Questions    1. In order to provide you with very good care, we would like to follow up with a phone call in the next few days.  May we have your permission to contact you?     YES /  NO    2. What is the best phone number to call you? (       )_____-__________    3. What is the best time to call you?      Morning  /  Afternoon  /  Evening                   Patient Signature:  ____________________________________________________________    Date:  ____________________________________________________________

## 2017-07-19 NOTE — ED AVS SNAPSHOT
7/19/2017    Neil Abdi  [address] [place] [address]    Dear Neil:    [facility] wants to ensure your discharge home is safe and you or your loved ones have had all of your questions answered regarding your care after you leave the hospital.    Below is a list of resources and contact information should you have any questions regarding your hospital stay, follow-up instructions, or active medical symptoms.    Questions or Concerns Regarding… Contact   Medical Questions Related to Your Discharge  (7 days a week, 8am-5pm) Contact a Nurse Care Coordinator   [phone]   Medical Questions Not Related to Your Discharge  (24 hours a day / 7 days a week)  Contact the Nurse Health Line   [phone]    Medications or Discharge Instructions Refer to your discharge packet   or contact your [facility] Primary Care Provider   [phone]   Follow-up Appointment(s) Schedule your appointment via [email or web address]   or contact Scheduling [phone]   Billing Review your statement via [email or web address]  or contact Billing [phone]   Medical Records Review your records via [email or web address]   or contact Medical Records [phone]     You may receive a telephone call within two days of discharge. This call is to make certain you understand your discharge instructions and have the opportunity to have any questions answered. You can also easily access your medical information, test results and upcoming appointments via the [email or web address] free online health management tool. You can learn more and sign up at [email or web address]. For assistance setting up your [email or web address] account, please call [phone].    Once again, we want to ensure your discharge home is safe and that you have a clear understanding of any next steps in your care. If you have any questions or concerns, please do not hesitate to contact us, we are here for you. Thank you for choosing [facility] for your healthcare needs.    Sincerely,    Your [facility] Healthcare Team

## 2017-07-20 ENCOUNTER — PATIENT OUTREACH (OUTPATIENT)
Dept: HEALTH INFORMATION MANAGEMENT | Facility: OTHER | Age: 47
End: 2017-07-20

## 2017-07-20 NOTE — ED PROVIDER NOTES
"ED Provider Note    Scribed for John Escamilla M.D. by Shantelle Mariee. 7/19/2017  7:25 PM    Primary care provider: [facility]  Means of arrival: Walk-in  History obtained from: Patient  History limited by: None    CHIEF COMPLAINT  Chief Complaint   Patient presents with   • Back Injury     back fusion (2015) rods placed. lifting something in garage and \"it felt like something came loose in my lumbar spine\" pt states he fell to gound due to pain. broight in by wheelchair to traige. pt states too painful to stand       HPI  Neil Abdi is a 46 y.o. male who presents for lower back pain onset 1730 today. The patient was lifting a keg at home and heard something pop. Immediately after the incident, he fell to the ground secondary to pain and is unable to stand on his own at this time. Currently, his pain is a 3-4/10 while he is resting on the bed, supporting his weight with his arms. The patient notes that he has had his L4 and S1 fused in May of 2015 and has not been experiencing any severe pain since then. The patient denies urinary incontinence, saddle paresthesia, or fever.     REVIEW OF SYSTEMS  Pertinent positives include: lower back pain.  Pertinent negatives include: urinary incontinence, numbness, fever.  E    PAST MEDICAL HISTORY  Past Medical History   Diagnosis Date   • Asthma    • Anxiety    • GERD (gastroesophageal reflux disease)    • Hypertension    • Migraine      SOCIAL HISTORY  Is currently employed.  Lives with wife.    CURRENT MEDICATIONS  No current facility-administered medications for this encounter.    Current outpatient prescriptions:   •  testosterone cypionate (DEPO-TESTOSTERONE) 200 MG/ML Solution injection, 0.6 mL by Intramuscular route every 7 days., Disp: 10 mL, Rfl: 0  •  ranitidine (ZANTAC) 150 MG Tab, Take 1 Tab by mouth 2 times a day., Disp: 20 Tab, Rfl: 0  •  lisinopril (PRINIVIL, ZESTRIL) 40 MG tablet, Take 1 Tab by mouth every day., Disp: 90 Tab, " "Rfl: 3  •  tretinoin (RETIN-A) 0.025 % cream, Apply daily after cleansing., Disp: 45 g, Rfl: 3  •  ALBUTEROL SULFATE HFA INH, Inhale  by mouth., Disp: , Rfl:   •  omeprazole (PRILOSEC) 20 MG delayed-release capsule, Take 1 Cap by mouth every day., Disp: 90 Cap, Rfl: 3  •  Cetirizine HCl (ZYRTEC ALLERGY PO), Take  by mouth., Disp: , Rfl:   •  hydrocodone-acetaminophen (NORCO) 5-325 MG Tab per tablet, Take 1 Tab by mouth every 6 hours as needed., Disp: 12 Tab, Rfl: 0      ALLERGIES  No Known Allergies    PHYSICAL EXAM  VITAL SIGNS: /92 mmHg  Pulse 94  Temp(Src) 36.3 °C (97.3 °F)  Resp 22  Ht 1.778 m (5' 10\")  Wt 92.987 kg (205 lb)  BMI 29.41 kg/m2  SpO2 100% reviewed and elevated blood pressure and tachypneic  Constitutional :  Well developed, Well nourished. Slightly diaphoretic  Eyes: pupils reactive without eye discharge nor conjunctival hyperemia.  Neck: Normal range of motion, No tenderness, Supple, No stridor.   Back: No tenderness on palpation, no step off  Neurologic: Extensor hallucis longus and ankle plantar flexion are intact and symmetric.  Sensation is intact to light touch in the both legs.  Deep tendon reflexes 2+ left, 0-1 Right. Straight leg raise negative  Musculoskeletal: no limb deformities.    RADIOLOGY:  DX-LUMBAR SPINE-2 OR 3 VIEWS   Final Result      1.  Postoperative change of lower lumbar spine as described above.   2.  No acute fracture identified.   3.  Hardware appears intact.        INTERVENTIONS:  Medications   ketorolac (TORADOL) injection 30 mg (30 mg Intravenous Given 7/19/17 2002)   morphine (pf) 10 mg/ml injection 10 mg (10 mg Intravenous Given 7/19/17 2002)     Response: Improvement in pain    COURSE & MEDICAL DECISION MAKING  7:25 PM - Patient seen and examined at bedside. Patient will be treated with Toradol 30mg IM, Morphine 10mg IV for his symptoms. Ordered Lumbar X-ray to evaluate.     8:42 PM Recheck: Patient re-evaluated at beside. Discussed patient's condition " and treatment plan including discharge and follow up. Patient's lab and radiology results discussed. I informed the patient that we would treat the symptoms as best as we could until the patient can see a specialist. The patient understood and is in agreement.     This patient presents with acute lumbar back pain after lifting injury and a history of an L5-S1 fusion. He has an acute or chronic loss of patellar reflexes on the right otherwise neurologic exam is nonfocal. This is likely a strain, disc bulge or disc herniation. There is no evidence of bony fracture or hardware fracture..    PLAN:  New Prescriptions    CYCLOBENZAPRINE (FLEXERIL) 10 MG TAB    Take 1 Tab by mouth 3 times a day as needed for Muscle Spasms.    METHYLPREDNISOLONE (MEDROL DOSEPAK) 4 MG TABLET THERAPY PACK    Sig: Per instructions on pack    OXYCODONE-ACETAMINOPHEN (PERCOCET) 5-325 MG TAB    Take 1-2 Tabs by mouth every 6 hours as needed (moderate to severe pain).     I have signed into and reviewed the patient's prescription monitoring program data prior to prescribing a scheduled drug.    Return for pain and fever or any new neurologic symptoms  Back pain handout given    Shen Alexander M.D.  [address] [address]  [phone]    Schedule an appointment as soon as possible for a visit in 1 week  As needed      CONDITION:  Good.    FINAL IMPRESSION:  1. Acute left-sided low back pain without sciatica        Electronically signed by: Shantelle Mariee, 7/19/2017    The note accurately reflects work and decisions made by me.  John Escamilla  7/19/2017  11:49 PM

## 2017-07-20 NOTE — DISCHARGE INSTRUCTIONS
Back Pain & Injury    Take ibuprofen 800 mg every 6 hours or naproxen 500 mg every 12 hours for 5-7 days. Start Medrol instead of naproxen if not improving in 5 days.  Followup if not better 7-10 days.  Return for weakness or fever.  Avoid lifting more than 10 pounds and avoid bending.  Avoid strict bedrest.    Your back pain is most likely caused by a strain of the muscles or ligaments that support the spine.  This is a  common injury. Back strains cause pain and trouble moving because of muscle spasms. They may take several weeks to heal, although they are usually much better after 2-3 days.     Your spinal column (backbone) is made up of 24 main vertebral bodies in addition to the sacrum and coccyx. These are held together by tough fibrous tissue called ligaments, and also by the support of your muscles. Nerve roots pass through the openings between the vertebrae. A sudden wrenching move or injury to the back may cause injury to, or pressure upon these nerves. This may result in localized back pain or radiation (movement) of pain into the buttocks and down the leg into the foot. The condition known as sciatica is frequently associated with a ruptured (herniated) disc. Pain is also created by muscle spasm alone.    Treatment for back pain includes:  l Rest - Get bed rest as needed over the next day or two.  Use a firm mattress and lie on your side with your knees slightly bent. If you lie on your back, put a pillow under your knees.  Use bed rest for only the most extreme, acute (sudden) episode. Prolonged bed rest over 48 hours will aggravate your condition.  l Early movement - Back pain improves most rapidly if you remain active. It is much more stressful on the back to sit or  one place than it is to move around.  Do not sit, drive or  one place for more than 30 minutes at a time.  Take short walks on level surfaces as soon as pain will allow.  l Limit bending and lifting - Don't bend over or  lift anything over 20 pounds until you are completely better. Learn to lift by bending your knees and using your leg muscles to help. Keep the load close to your body and avoid twisting, reaching and overhead work.     l Medicines - Medicine to reduce pain and inflammation are helpful and muscle-relaxing drugs may also be prescribed.  l Therapy - Ice used for acute conditions is very effective. Use a large plastic bag filled with ice and wrapped in a towel. This also provides excellent pain relief. This may be continuous or for thirty minutes every two hours during acute phase, then as needed. Heat for thirty minutes prior to activities is helpful.  Back exercises and gentle massage may be of some benefit.You should be examined again if your back pain is not better in one week.     TO PREVENT:  Avoid an underactive life style. Active exercise, as directed by your caregiver, is your greatest weapon against back pain. Hard physical activities such as tennis, racquetball, water skiing etc., without proper physical conditioning may aggravate and/or create problems, especially if you are not in condition for that activity. If you have a back problem it is especially important to avoid sports requiring sudden body movements. Swimming and walking are generally safer activities. Maintain good posture. Avoid obesity.    See your caregiver for continued problems. Your caregiver can help or refer you for appropriate exercises and work hardening. Work hardening means that the back is put through the proper exercises and rehabilitation to treat the present problems and prevent future problems. With conditioning, most back problems can be avoided. Sometimes a more serious issue may be the cause of back pain and you should be seen immediately again if new problems seem to be developing.    seek immediate medical attention if:  Ø Numbness, tingling, weakness, or problem with the use of your arms or legs.  Ø Severe back pain not  relieved with medications.  Ø Change in bowel or bladder control.  Ø Increasing pain in any areas of the body.  Ø Shortness of breath, dizziness or fainting.  Ø Nausea (feeling sick to your stomach), vomiting or sweats.  Ø Fever above 101º    ExitCare® Patient Information ©2007 [employer].

## 2017-07-20 NOTE — ED NOTES
"Chief Complaint   Patient presents with   • Back Injury     back fusion (2015) rods placed. lifting something in garage and \"it felt like something came loose in my lumbar spine\" pt states he fell to gound due to pain. broight in by wheelchair to traige. pt states too painful to stand       "

## 2017-07-20 NOTE — ED NOTES
All lines and monitors d/c'd. Discharge paperwork and medications reviewed. Pt states he understands and had no questions. Pt encouraged to follow-up with PCP and come back to ER with worsening symptoms. Instructed not to drive after taking pain medication. Pt verbalizes understanding. Pt ambulatory with steady gait to lobby.

## 2017-10-24 DIAGNOSIS — E29.1 HYPOGONADISM IN MALE: ICD-10-CM

## 2017-10-26 RX ORDER — TESTOSTERONE CYPIONATE 200 MG/ML
INJECTION, SOLUTION INTRAMUSCULAR
Qty: 10 ML | Refills: 0 | Status: SHIPPED
Start: 2017-10-26 | End: 2018-03-18 | Stop reason: SDUPTHER

## 2017-11-09 DIAGNOSIS — L70.0 ACNE VULGARIS: ICD-10-CM

## 2017-11-10 RX ORDER — DOXYCYCLINE HYCLATE 100 MG
TABLET ORAL
Qty: 90 TAB | Refills: 0 | Status: SHIPPED | OUTPATIENT
Start: 2017-11-10 | End: 2018-03-06 | Stop reason: SDUPTHER

## 2018-01-31 DIAGNOSIS — I10 ESSENTIAL HYPERTENSION: ICD-10-CM

## 2018-01-31 DIAGNOSIS — K21.9 GASTROESOPHAGEAL REFLUX DISEASE WITHOUT ESOPHAGITIS: ICD-10-CM

## 2018-01-31 NOTE — TELEPHONE ENCOUNTER
Was the patient seen in the last year in this department? Yes   Last seen 5/2/2017    Does patient have an active prescription for medications requested? No     Received Request Via: Pharmacy

## 2018-02-01 RX ORDER — LISINOPRIL 40 MG/1
TABLET ORAL
Qty: 90 TAB | Refills: 0 | Status: SHIPPED | OUTPATIENT
Start: 2018-02-01 | End: 2019-05-17 | Stop reason: SDUPTHER

## 2018-02-01 RX ORDER — OMEPRAZOLE 20 MG/1
20 CAPSULE, DELAYED RELEASE ORAL DAILY
Qty: 90 CAP | Refills: 0 | Status: SHIPPED | OUTPATIENT
Start: 2018-02-01 | End: 2018-05-14 | Stop reason: SDUPTHER

## 2018-02-28 ENCOUNTER — TELEPHONE (OUTPATIENT)
Dept: MEDICAL GROUP | Facility: PHYSICIAN GROUP | Age: 48
End: 2018-02-28

## 2018-02-28 NOTE — TELEPHONE ENCOUNTER
1. Caller Name: Princess - wife                                     Call Back Number: [phone] (home)       Patient approves a detailed voicemail message: yes    Patient's wife presented to the  requesting a lab order be sent prior to appt on 3/12/18 for testosterone and estrogen check.

## 2018-03-01 DIAGNOSIS — E29.1 HYPOGONADISM IN MALE: ICD-10-CM

## 2018-03-01 DIAGNOSIS — E78.2 MIXED HYPERLIPIDEMIA: ICD-10-CM

## 2018-03-01 DIAGNOSIS — I10 ESSENTIAL HYPERTENSION: ICD-10-CM

## 2018-03-06 DIAGNOSIS — E29.1 HYPOGONADISM IN MALE: ICD-10-CM

## 2018-03-06 DIAGNOSIS — L70.0 ACNE VULGARIS: ICD-10-CM

## 2018-03-06 RX ORDER — DOXYCYCLINE HYCLATE 100 MG
TABLET ORAL
Qty: 90 TAB | Refills: 0 | Status: SHIPPED | OUTPATIENT
Start: 2018-03-06 | End: 2018-06-13 | Stop reason: SDUPTHER

## 2018-03-07 ENCOUNTER — HOSPITAL ENCOUNTER (OUTPATIENT)
Dept: LAB | Facility: MEDICAL CENTER | Age: 48
End: 2018-03-07
Attending: NURSE PRACTITIONER
Payer: COMMERCIAL

## 2018-03-07 DIAGNOSIS — E78.2 MIXED HYPERLIPIDEMIA: ICD-10-CM

## 2018-03-07 DIAGNOSIS — I10 ESSENTIAL HYPERTENSION: ICD-10-CM

## 2018-03-07 LAB
ALBUMIN SERPL BCP-MCNC: 5 G/DL (ref 3.2–4.9)
ALBUMIN/GLOB SERPL: 1.7 G/DL
ALP SERPL-CCNC: 77 U/L (ref 30–99)
ALT SERPL-CCNC: 98 U/L (ref 2–50)
ANION GAP SERPL CALC-SCNC: 9 MMOL/L (ref 0–11.9)
AST SERPL-CCNC: 45 U/L (ref 12–45)
BILIRUB SERPL-MCNC: 0.6 MG/DL (ref 0.1–1.5)
BUN SERPL-MCNC: 13 MG/DL (ref 8–22)
CALCIUM SERPL-MCNC: 9.5 MG/DL (ref 8.5–10.5)
CHLORIDE SERPL-SCNC: 105 MMOL/L (ref 96–112)
CHOLEST SERPL-MCNC: 198 MG/DL (ref 100–199)
CO2 SERPL-SCNC: 26 MMOL/L (ref 20–33)
CREAT SERPL-MCNC: 1.26 MG/DL (ref 0.5–1.4)
DHEA-S SERPL-MCNC: 96 UG/DL (ref 44.3–331)
ESTRADIOL SERPL-MCNC: 50 PG/ML
FSH SERPL-ACNC: 0.5 MIU/ML (ref 1.5–12.4)
GLOBULIN SER CALC-MCNC: 2.9 G/DL (ref 1.9–3.5)
GLUCOSE SERPL-MCNC: 86 MG/DL (ref 65–99)
HDLC SERPL-MCNC: 40 MG/DL
LDLC SERPL CALC-MCNC: 95 MG/DL
LH SERPL-ACNC: <0.2 IU/L (ref 1.7–8.6)
POTASSIUM SERPL-SCNC: 4.1 MMOL/L (ref 3.6–5.5)
PROGEST SERPL-MCNC: 0.65 NG/ML
PROT SERPL-MCNC: 7.9 G/DL (ref 6–8.2)
SODIUM SERPL-SCNC: 140 MMOL/L (ref 135–145)
TRIGL SERPL-MCNC: 315 MG/DL (ref 0–149)

## 2018-03-07 PROCEDURE — 82670 ASSAY OF TOTAL ESTRADIOL: CPT

## 2018-03-07 PROCEDURE — 82627 DEHYDROEPIANDROSTERONE: CPT

## 2018-03-07 PROCEDURE — 84270 ASSAY OF SEX HORMONE GLOBUL: CPT

## 2018-03-07 PROCEDURE — 36415 COLL VENOUS BLD VENIPUNCTURE: CPT

## 2018-03-07 PROCEDURE — 83002 ASSAY OF GONADOTROPIN (LH): CPT

## 2018-03-07 PROCEDURE — 84144 ASSAY OF PROGESTERONE: CPT

## 2018-03-07 PROCEDURE — 83001 ASSAY OF GONADOTROPIN (FSH): CPT

## 2018-03-07 PROCEDURE — 80061 LIPID PANEL: CPT

## 2018-03-07 PROCEDURE — 84403 ASSAY OF TOTAL TESTOSTERONE: CPT

## 2018-03-07 PROCEDURE — 80053 COMPREHEN METABOLIC PANEL: CPT

## 2018-03-07 PROCEDURE — 82679 ASSAY OF ESTRONE: CPT

## 2018-03-07 PROCEDURE — 84402 ASSAY OF FREE TESTOSTERONE: CPT

## 2018-03-08 LAB
SHBG SERPL-SCNC: 18 NMOL/L (ref 11–80)
TESTOST FREE MFR SERPL: 2.6 % (ref 1.6–2.9)
TESTOST FREE SERPL-MCNC: 213 PG/ML (ref 47–244)
TESTOST SERPL-MCNC: 815 NG/DL (ref 300–890)

## 2018-03-09 LAB — ESTRONE SERPL-MCNC: 49 PG/ML (ref 9–36)

## 2018-03-12 ENCOUNTER — OFFICE VISIT (OUTPATIENT)
Dept: MEDICAL GROUP | Facility: PHYSICIAN GROUP | Age: 48
End: 2018-03-12
Payer: COMMERCIAL

## 2018-03-12 VITALS
RESPIRATION RATE: 14 BRPM | HEART RATE: 86 BPM | BODY MASS INDEX: 29.92 KG/M2 | TEMPERATURE: 97.6 F | WEIGHT: 209 LBS | SYSTOLIC BLOOD PRESSURE: 126 MMHG | DIASTOLIC BLOOD PRESSURE: 88 MMHG | HEIGHT: 70 IN | OXYGEN SATURATION: 95 %

## 2018-03-12 DIAGNOSIS — R22.1 LUMP IN NECK: ICD-10-CM

## 2018-03-12 DIAGNOSIS — J45.990 EXERCISE-INDUCED ASTHMA: ICD-10-CM

## 2018-03-12 DIAGNOSIS — R79.89 LOW TESTOSTERONE IN MALE: ICD-10-CM

## 2018-03-12 PROCEDURE — 99214 OFFICE O/P EST MOD 30 MIN: CPT | Performed by: NURSE PRACTITIONER

## 2018-03-12 RX ORDER — ALBUTEROL SULFATE 90 UG/1
2 AEROSOL, METERED RESPIRATORY (INHALATION) EVERY 6 HOURS PRN
Qty: 8.5 G | Refills: 6 | Status: SHIPPED | OUTPATIENT
Start: 2018-03-12

## 2018-03-12 ASSESSMENT — PATIENT HEALTH QUESTIONNAIRE - PHQ9
CLINICAL INTERPRETATION OF PHQ2 SCORE: 2
SUM OF ALL RESPONSES TO PHQ QUESTIONS 1-9: 4
5. POOR APPETITE OR OVEREATING: 0 - NOT AT ALL

## 2018-03-12 ASSESSMENT — PAIN SCALES - GENERAL: PAINLEVEL: NO PAIN

## 2018-03-13 NOTE — PROGRESS NOTES
Chief Complaint   Patient presents with   • Results     Labs    • Medication Refill     inhaler        HISTORY OF PRESENT ILLNESS: Patient is a 47 y.o. male established patient who presents today to discuss labs.    Low testosterone in male  Chronic in nature. Patient was previously stable on testosterone and managed by primary care provider in [place], [place]. 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 Patient states that he has decreased his dose of testosterone to 0.55 mL. But states that he is noticing increased acne, increased irritation, frequent crying and mood swings. Prior to starting testosterone patient's testosterone level was below 200 patient stated that lowest level was 160.  Patient is requesting referral to endocrinology at this time. Liver enzyme is more increased discussed with patient who attributes this to dehydration at the time his labs were drawn discussed with patient that this could gain more serious liver inflammation, patient declines further testing at this time. Patient denies abdominal pain, change in stool. Patient requested estrogens ordered with this lab work, which were abnormal patient states these were frequently checked by his previous PCP.     Lump in neck  This is a new problem. Patient states that he has noticed a round lump which has not changed in size since he noticed it several months ago. Patient states that he has not found anything that makes it better or worse patient states that it is not painful.    Exercise-induced asthma  Chronic in nature. Stable. Patient is requesting refill of inhaler at this time. Patient denies increased shortness of breath, wheezing or nighttime waking. he denies side effects from medication.      Patient Active Problem List    Diagnosis Date Noted   • Lump in neck 03/12/2018   • Acne vulgaris 03/07/2017   • Chronic gout involving toe  of left foot without tophus 03/07/2017   • Mixed hyperlipidemia 03/07/2017   • Essential hypertension 02/16/2017   • Gastroesophageal reflux disease without esophagitis 02/16/2017   • Multiple allergies 02/16/2017   • Low testosterone in male 02/16/2017   • Exercise-induced asthma 02/16/2017       Allergies:Patient has no known allergies.    Current Outpatient Prescriptions   Medication Sig Dispense Refill   • albuterol 108 (90 Base) MCG/ACT Aero Soln inhalation aerosol Inhale 2 Puffs by mouth every 6 hours as needed for Shortness of Breath. 8.5 g 6   • omeprazole (PRILOSEC) 20 MG delayed-release capsule TAKE 1 CAP BY MOUTH EVERY DAY. 90 Cap 0   • lisinopril (PRINIVIL, ZESTRIL) 40 MG tablet TAKE 1 TABLET BY MOUTH EVERY DAY. 90 Tab 0   • testosterone cypionate (DEPO-TESTOSTERONE) 200 MG/ML Solution injection INJECT 0.6 ML BY MINTRAMUSCULAR ROUTE EVERY 7 DAYS 10 mL 0   • ranitidine (ZANTAC) 150 MG Tab Take 1 Tab by mouth 2 times a day. 20 Tab 0   • tretinoin (RETIN-A) 0.025 % cream Apply daily after cleansing. 45 g 3   • Cetirizine HCl (ZYRTEC ALLERGY PO) Take  by mouth.     • doxycycline (VIBRAMYCIN) 100 MG Tab TAKE 1 TABLET BY MOUTH EVERY DAY. 90 Tab 0   • oxycodone-acetaminophen (PERCOCET) 5-325 MG Tab Take 1-2 Tabs by mouth every 6 hours as needed (moderate to severe pain). 15 Tab 0   • cyclobenzaprine (FLEXERIL) 10 MG Tab Take 1 Tab by mouth 3 times a day as needed for Muscle Spasms. 15 Tab 0   • MethylPREDNISolone (MEDROL DOSEPAK) 4 MG Tablet Therapy Pack Sig: Per instructions on pack 1 Kit 0   • hydrocodone-acetaminophen (NORCO) 5-325 MG Tab per tablet Take 1 Tab by mouth every 6 hours as needed. 12 Tab 0     No current facility-administered medications for this visit.        Social History   Substance Use Topics   • Smoking status: Former Smoker     Quit date: 7/31/2002   • Smokeless tobacco: Former User   • Alcohol use 1.8 oz/week     1 Glasses of wine, 1 Cans of beer, 1 Shots of liquor per week      Comment:  "weekly       Family Status   Relation Status   • Mother    • Father Alive   • Sister Alive   • Sister Alive   • Sister Alive     Family History   Problem Relation Age of Onset   • Cancer Mother      colon   • No Known Problems Father    • No Known Problems Sister    • No Known Problems Sister    • No Known Problems Sister        Review of Systems:   Constitutional:  Negative for fever, chills, weight loss and malaise/fatigue.   HEENT:  Negative for ear pain, nosebleeds, congestion, sore throat and neck pain.    Eyes:  Negative for blurred vision.   Respiratory:  Negative for cough, sputum production, shortness of breath and wheezing.    Cardiovascular:  Negative for chest pain, palpitations, orthopnea and leg swelling.   Gastrointestinal:  Negative for heartburn, nausea, vomiting and abdominal pain.   Genitourinary:  Negative for dysuria, urgency and frequency.   Musculoskeletal:  Negative for myalgias, back pain and joint pain.   Skin:  Negative for rash and itching.   Neurological:  Negative for dizziness, tingling, tremors, sensory change, focal weakness and headaches.   Endo/Heme/Allergies:  Does not bruise/bleed easily.   Psychiatric/Behavioral:  Negative for depression, suicidal ideas and memory loss.  The patient is not nervous/anxious and does not have insomnia.    All other systems reviewed and are negative except as in HPI.    Exam:  Blood pressure 126/88, pulse 86, temperature 36.4 °C (97.6 °F), resp. rate 14, height 1.778 m (5' 10\"), weight 94.8 kg (209 lb), SpO2 95 %.  General:  Normal appearing. No distress.  Pulmonary:  Clear to ausculation.  Normal effort. No rales, ronchi, or wheezing.  Cardiovascular:  Regular rate and rhythm without murmur. Carotid and radial pulses are intact and equal bilaterally.  Neurologic:  Grossly nonfocal  Lymph:  No cervical, supraclavicular or axillary lymph nodes are palpable  Skin:  Warm and dry.  No obvious lesions.  Musculoskeletal:  Normal gait. No extremity " cyanosis, clubbing, or edema.  Psych:  Normal mood and affect. Alert and oriented x3. Judgment and insight is normal.      PLAN:    1. Lump in neck  Patient will complete ultrasound, labs as ordered.  - US-SOFT TISSUES OF HEAD - NECK; Future  - CBC WITH DIFFERENTIAL; Future  - COMP METABOLIC PANEL; Future    2. Exercise-induced asthma  Continue inhaler as needed.  - albuterol 108 (90 Base) MCG/ACT Aero Soln inhalation aerosol; Inhale 2 Puffs by mouth every 6 hours as needed for Shortness of Breath.  Dispense: 8.5 g; Refill: 6    3. Low testosterone in male  Discussed with patient related to instability, change in symptoms we will refer him to endocrinology for further management.  - REFERRAL TO ENDOCRINOLOGY    Follow-up in 6 months or sooner as needed. Patient is encouraged to be seen in the emergency room for chest pain, palpitations, shortness of breath, dizziness, severe abdominal pain or other concerning symptoms.    Please note that this dictation was created using voice recognition software. I have made every reasonable attempt to correct obvious errors, but I expect that there are errors of grammar and possibly content that I did not discover before finalizing the note.    Assessment/Plan:  1. Lump in neck  US-SOFT TISSUES OF HEAD - NECK    CBC WITH DIFFERENTIAL    COMP METABOLIC PANEL   2. Exercise-induced asthma  albuterol 108 (90 Base) MCG/ACT Aero Soln inhalation aerosol   3. Low testosterone in male  REFERRAL TO ENDOCRINOLOGY          I have placed the below orders and discussed them with an approved delegating provider. The MA is performing the below orders under the direction of Dr. Viera.

## 2018-03-18 DIAGNOSIS — E29.1 HYPOGONADISM IN MALE: ICD-10-CM

## 2018-03-19 ENCOUNTER — HOSPITAL ENCOUNTER (OUTPATIENT)
Dept: RADIOLOGY | Facility: MEDICAL CENTER | Age: 48
End: 2018-03-19
Attending: NURSE PRACTITIONER
Payer: COMMERCIAL

## 2018-03-19 ENCOUNTER — PATIENT MESSAGE (OUTPATIENT)
Dept: MEDICAL GROUP | Facility: PHYSICIAN GROUP | Age: 48
End: 2018-03-19

## 2018-03-19 DIAGNOSIS — R22.1 LUMP IN NECK: ICD-10-CM

## 2018-03-19 PROCEDURE — 76536 US EXAM OF HEAD AND NECK: CPT

## 2018-03-20 ENCOUNTER — TELEPHONE (OUTPATIENT)
Dept: MEDICAL GROUP | Facility: PHYSICIAN GROUP | Age: 48
End: 2018-03-20

## 2018-03-20 ENCOUNTER — PATIENT MESSAGE (OUTPATIENT)
Dept: MEDICAL GROUP | Facility: PHYSICIAN GROUP | Age: 48
End: 2018-03-20

## 2018-03-20 RX ORDER — TESTOSTERONE CYPIONATE 200 MG/ML
INJECTION, SOLUTION INTRAMUSCULAR
Qty: 10 ML | Refills: 1 | Status: SHIPPED | OUTPATIENT
Start: 2018-03-20 | End: 2018-06-20

## 2018-03-20 NOTE — TELEPHONE ENCOUNTER
DOCUMENTATION OF PAR STATUS:    1. Name of Medication & Dose: Testosterone 200mg/ml    2. Name of Prescription Coverage Company & phone #: [employer]     3. Date Prior Auth Submitted: 03/20/2018    4. What information was given to obtain insurance decision? Yes     5. Prior Auth Status? Pending    6. Patient Notified: no

## 2018-03-22 DIAGNOSIS — R22.1 LUMP IN NECK: ICD-10-CM

## 2018-03-27 ENCOUNTER — TELEPHONE (OUTPATIENT)
Dept: MEDICAL GROUP | Facility: PHYSICIAN GROUP | Age: 48
End: 2018-03-27

## 2018-03-27 ENCOUNTER — OFFICE VISIT (OUTPATIENT)
Dept: HEMATOLOGY ONCOLOGY | Facility: MEDICAL CENTER | Age: 48
End: 2018-03-27
Payer: COMMERCIAL

## 2018-03-27 VITALS
BODY MASS INDEX: 30.28 KG/M2 | WEIGHT: 211.53 LBS | TEMPERATURE: 97.9 F | HEIGHT: 70 IN | DIASTOLIC BLOOD PRESSURE: 72 MMHG | SYSTOLIC BLOOD PRESSURE: 122 MMHG | OXYGEN SATURATION: 98 % | HEART RATE: 91 BPM | RESPIRATION RATE: 16 BRPM

## 2018-03-27 DIAGNOSIS — R22.1 LUMP IN NECK: ICD-10-CM

## 2018-03-27 PROCEDURE — 99205 OFFICE O/P NEW HI 60 MIN: CPT | Performed by: NURSE PRACTITIONER

## 2018-03-27 ASSESSMENT — ENCOUNTER SYMPTOMS
COUGH: 1
HEADACHES: 1
SHORTNESS OF BREATH: 0
FEVER: 0
CONSTIPATION: 0
WEIGHT LOSS: 0
WHEEZING: 0
DIARRHEA: 0
VOMITING: 0
CHILLS: 0
PALPITATIONS: 0
DIAPHORESIS: 1
INSOMNIA: 1
DIZZINESS: 1
NAUSEA: 0

## 2018-03-27 ASSESSMENT — PAIN SCALES - GENERAL: PAINLEVEL: NO PAIN

## 2018-03-27 NOTE — TELEPHONE ENCOUNTER
FINAL PRIOR AUTHORIZATION STATUS:    1.  Name of Medication & Dose: Depo Testosterone     2. Prior Auth Status: Approved through 03/23/2019     3. Action Taken: Pharmacy Notified: no Patient Notified: no

## 2018-03-27 NOTE — PROGRESS NOTES
Subjective:      Neil Abdi Jr. is a 47 y.o. male who presents as a New Patient  For a neck mass.         HPI    Patient referred to me, Intake Oncology Coordinator by his PCP ARLENE James for a neck mass.  Patient is unaccompanied for today's visit.    Patient was recently seen by his primary care provider and he informed her of a lump in his neck that he has noticed for quite some time. Patient stated he thinks he notices approximately 4-6 months ago. Patient does feel like it is getting bigger. He stated that he believes he might have noticed something proximally 3 years ago after he had a major spinal fusion surgery. He noticed it after surgery when he was doing physical therapy but didn't think anything of it. Just then within the last 4-6 months patient has noticed it more so lately. Patient stated before he could only feel it would doubt he can see it as well. He does not believe it is a muscle issue and states it is nontender. Patient does have some pain in his right shoulder which causes him to have some difficulty sleeping. He is very active. 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 Patient was sent for an ultrasound of the neck. The ultrasound showed a small subcutaneous mass at the base of the right neck in the right supraclavicular region which measures 1.6 x 1.8 x 0.46 cm. Multiple differential diagnoses such as hematoma, neuroma, abscess or a soft tissue sarcoma were given by the radiologist. The personally reviewed the film as well as reviewed the report with the patient today.    Patient is also dealing with significant issues with his hormone levels. He does take testosterone which she has been doing for quite some time. He states his hormone levels are waxing and waning and he is currently waiting to get in to be seen by endocrinology. He does have some significant fatigue and night sweats but thinks this may be related to hormones. He does have some  headaches at times with some disorientation as well. He is questioning if this is related to the testosterone levels as well. Patient does have joint pain in his his right shoulder. He does state he has a mild cough and some sinus congestion but believes it's related to seasonal allergies.    Please see past medical and surgical history below.     Patient does have a family history of cancer. Patient did stated his mother  of colon cancer, his father is currently undergoing treatment for prostate cancer, his paternal uncle does have cancer but he is unaware of the type, his maternal grandfather had lung cancer, and a paternal grandfather had throat cancer.    Patient is a former smoker. He quit in  but was slightly off and on in  after her divorce.     No Known Allergies  Current Outpatient Prescriptions on File Prior to Visit   Medication Sig Dispense Refill   • doxycycline (VIBRAMYCIN) 100 MG Tab TAKE 1 TABLET BY MOUTH EVERY DAY. 90 Tab 0   • omeprazole (PRILOSEC) 20 MG delayed-release capsule TAKE 1 CAP BY MOUTH EVERY DAY. 90 Cap 0   • lisinopril (PRINIVIL, ZESTRIL) 40 MG tablet TAKE 1 TABLET BY MOUTH EVERY DAY. 90 Tab 0   • Cetirizine HCl (ZYRTEC ALLERGY PO) Take  by mouth.     • testosterone cypionate (DEPO-TESTOSTERONE) 200 MG/ML Solution injection Inject 0.6 mL IM every 7 days. 10 mL 1   • albuterol 108 (90 Base) MCG/ACT Aero Soln inhalation aerosol Inhale 2 Puffs by mouth every 6 hours as needed for Shortness of Breath. 8.5 g 6   • oxycodone-acetaminophen (PERCOCET) 5-325 MG Tab Take 1-2 Tabs by mouth every 6 hours as needed (moderate to severe pain). 15 Tab 0   • cyclobenzaprine (FLEXERIL) 10 MG Tab Take 1 Tab by mouth 3 times a day as needed for Muscle Spasms. 15 Tab 0   • MethylPREDNISolone (MEDROL DOSEPAK) 4 MG Tablet Therapy Pack Sig: Per instructions on pack 1 Kit 0   • ranitidine (ZANTAC) 150 MG Tab Take 1 Tab by mouth 2 times a day. 20 Tab 0   • tretinoin (RETIN-A) 0.025 % cream Apply  daily after cleansing. 45 g 3   • hydrocodone-acetaminophen (NORCO) 5-325 MG Tab per tablet Take 1 Tab by mouth every 6 hours as needed. 12 Tab 0     No current facility-administered medications on file prior to visit.      Past Medical History:   Diagnosis Date   • Anxiety    • Asthma    • GERD (gastroesophageal reflux disease)    • Hypertension    • Migraine      Past Surgical History:   Procedure Laterality Date   • LAMINOTOMY       Family History   Problem Relation Age of Onset   • Cancer Mother      colon   • Cancer Father      Prostate   • No Known Problems Sister    • No Known Problems Sister    • No Known Problems Sister    • Cancer Paternal Uncle      Unknown   • Cancer Maternal Grandfather      Lung   • Cancer Paternal Grandfather      Throat     Social History     Social History   • Marital status:      Spouse name: N/A   • Number of children: 0   • Years of education: N/A     Social History Main Topics   • Smoking status: Former Smoker     Quit date: 7/31/2002   • Smokeless tobacco: Former User   • Alcohol use 1.8 oz/week     1 Glasses of wine, 1 Cans of beer, 1 Shots of liquor per week      Comment: weekly   • Drug use: No   • Sexual activity: Yes     Partners: Female     Other Topics Concern   • Not on file     Social History Narrative   • No narrative on file       Review of Systems   Constitutional: Positive for diaphoresis (night sweats present but could be to testosterone levels) and malaise/fatigue. Negative for chills, fever and weight loss.   HENT: Positive for congestion (mild with post nasal drip).    Respiratory: Positive for cough. Negative for shortness of breath and wheezing.    Cardiovascular: Negative for chest pain and palpitations.   Gastrointestinal: Negative for constipation, diarrhea, nausea and vomiting.   Genitourinary: Negative for dysuria.        Sometimes it is hard to go   Musculoskeletal: Positive for joint pain (right shoulder).   Skin: Negative for itching and rash.  "       Acne on his back   Neurological: Positive for dizziness and headaches.        At times he may feel a little disoriented after exertion   Psychiatric/Behavioral: The patient has insomnia.           Objective:     /72   Pulse 91   Temp 36.6 °C (97.9 °F)   Resp 16   Ht 1.778 m (5' 10\")   Wt 95.9 kg (211 lb 8.5 oz)   SpO2 98%   BMI 30.35 kg/m²      Physical Exam   Constitutional: He is oriented to person, place, and time. He appears well-developed and well-nourished. No distress.   HENT:   Head: Normocephalic and atraumatic.   Mouth/Throat: Oropharynx is clear and moist. No oropharyngeal exudate.   Eyes: Conjunctivae and EOM are normal. Pupils are equal, round, and reactive to light. Right eye exhibits no discharge. Left eye exhibits no discharge. No scleral icterus.   Neck: Normal range of motion. Neck supple. No thyromegaly present.   Cardiovascular: Normal rate, regular rhythm, normal heart sounds and intact distal pulses.  Exam reveals no gallop and no friction rub.    No murmur heard.  Pulmonary/Chest: Effort normal and breath sounds normal.   Abdominal: Soft. Bowel sounds are normal. He exhibits no distension. There is no tenderness.   Musculoskeletal: Normal range of motion. He exhibits no edema or tenderness.   Lymphadenopathy:        Head (right side): No submental, no submandibular, no tonsillar, no preauricular, no posterior auricular and no occipital adenopathy present.        Head (left side): No submental, no submandibular, no tonsillar, no preauricular, no posterior auricular and no occipital adenopathy present.     He has no cervical adenopathy.     He has no axillary adenopathy.        Right axillary: No lateral adenopathy present.        Left axillary: No lateral adenopathy present.       Right: No inguinal and no supraclavicular adenopathy present.        Left: No inguinal and no supraclavicular adenopathy present.   Neurological: He is alert and oriented to person, place, and " time.   Skin: Skin is warm and dry. No rash noted. He is not diaphoretic. No erythema. No pallor.   Psychiatric: He has a normal mood and affect. His behavior is normal.   Vitals reviewed.      Us-soft Tissues Of Head - Neck    Result Date: 3/19/2018  3/19/2018 4:10 PM HISTORY/REASON FOR EXAM:  Mass/Lump TECHNIQUE/EXAM DESCRIPTION: Ultrasound of the soft tissues of the right shoulder. COMPARISON:  None FINDINGS: There is a 1.65 x 0.46 x 1.8 cm elliptical subcutaneous mass involving the right supraclavicular region at the base of the right neck.     1.  Small subcutaneous mass the base of the right neck in the right supraclavicular region which measures 1.6 x 1.8 x 0.46 cm in greatest diameter and may represent a small hematoma, neuroma, abscess, or possibly a soft tissue sarcoma.       Assessment/Plan:     1. Lump in neck  CT-SOFT TISSUE NECK WITH     Plan  1. Patient does have an abnormal finding of a small subcutaneous mass at the base of the right neck measuring 1.8 cm in greatest dimension. Multiple differentials are included along with possible malignancy. I will proceed with a CT scan of the soft tissue of the neck with contrast for better evaluation of the mass. I discussed with patient the possibility of proceeding with a biopsy. I will follow-up with the patient over the phone to discuss the results and discuss further plan of care. Patient verbalized understanding.    Spent 60 minutes in direct, face-to-face patient contact in which greater than 50% of the visit was spent counseling and coordinating of care.       Please note that this dictation was created using voice recognition software. I have made every reasonable attempt to correct obvious errors, but I expect that there are errors of grammar and possibly content that I did not discover before finalizing the note.

## 2018-04-04 ENCOUNTER — HOSPITAL ENCOUNTER (OUTPATIENT)
Dept: RADIOLOGY | Facility: MEDICAL CENTER | Age: 48
End: 2018-04-04
Attending: NURSE PRACTITIONER
Payer: COMMERCIAL

## 2018-04-04 ENCOUNTER — TELEPHONE (OUTPATIENT)
Dept: HEMATOLOGY ONCOLOGY | Facility: MEDICAL CENTER | Age: 48
End: 2018-04-04

## 2018-04-04 DIAGNOSIS — R22.1 LUMP IN NECK: ICD-10-CM

## 2018-04-04 PROCEDURE — 70491 CT SOFT TISSUE NECK W/DYE: CPT

## 2018-04-04 PROCEDURE — 700117 HCHG RX CONTRAST REV CODE 255: Performed by: NURSE PRACTITIONER

## 2018-04-04 RX ADMIN — IOHEXOL 75 ML: 350 INJECTION, SOLUTION INTRAVENOUS at 08:11

## 2018-04-04 NOTE — TELEPHONE ENCOUNTER
Patient completed CT scan of the neck which showed a small subcutaneous faint soft tissue density in the posterior-lateral right neck region. It measures approximately 1.5 x 0.7 x 0.9 cm in size. I discussed the case with Dr. Craven. I spoke to the patient with phone today regarding the results. Discussed with the patient options to either watch and wait and repeat imaging in approximately 3 months or proceed with a biopsy at this time. Patient decided to proceed with repeat imaging in approximately 3 months. He did state he will keep an eye on the mass and if it tends to enlarge over the next few months he will contact me sooner. Patient originally developed phone call.      Ct-soft Tissue Neck With    Result Date: 4/4/2018 4/4/2018 7:47 AM HISTORY/REASON FOR EXAM:  Mass in right neck. TECHNIQUE/EXAM DESCRIPTION AND NUMBER OF VIEWS:  CT soft tissue neck with contrast. Helical scanning was obtained from the skull base through the thoracic inlet.  75 mL of Optiray 300 nonionic contrast was administered in an IV bolus fashion without complication. Low dose optimization technique was utilized for this CT exam including automated exposure control and adjustment of the mA and/or kV according to patient size. COMPARISON: Ultrasound 03/19/2018 FINDINGS: There is no evidence of skull base mass. There is no significant sinus disease. The pharynx and larynx are normal.  There is no evidence of mass.  No evidence of retropharyngeal or peritonsillar abscess. There is a subtle soft tissue density in the subcutaneous fat at the junction of the right neck and shoulder at site of palpable abnormality. The lesion measures 1.5 x 0.7 x 0.9 cm in size. The lesion appears slightly lobulated. No other evidence of mass or adenopathy is identified within the soft tissues of the neck bilaterally. No masses are seen within the lung apices.     1.  Small subcutaneous faint soft tissue density is identified at site of palpable abnormality in  the posterior lateral right neck region as described above. The lesion was also identified on ultrasound. Differential diagnosis includes a mildly enlarged lymph node. Enlargement could be due to inflammation infection or neoplasm. Subcutaneous sebaceous cyst is also possible. Hematoma is also a possibility. Soft tissue inflammation or infection is also a possibility. 2.  No other evidence of adenopathy. 3.  Exam is otherwise within normal limits.

## 2018-04-19 ENCOUNTER — APPOINTMENT (OUTPATIENT)
Dept: RADIOLOGY | Facility: IMAGING CENTER | Age: 48
End: 2018-04-19
Attending: NURSE PRACTITIONER
Payer: COMMERCIAL

## 2018-04-19 ENCOUNTER — OFFICE VISIT (OUTPATIENT)
Dept: URGENT CARE | Facility: CLINIC | Age: 48
End: 2018-04-19
Payer: COMMERCIAL

## 2018-04-19 VITALS
RESPIRATION RATE: 16 BRPM | HEART RATE: 76 BPM | HEIGHT: 70 IN | TEMPERATURE: 97.5 F | SYSTOLIC BLOOD PRESSURE: 110 MMHG | BODY MASS INDEX: 29.86 KG/M2 | WEIGHT: 208.56 LBS | OXYGEN SATURATION: 96 % | DIASTOLIC BLOOD PRESSURE: 88 MMHG

## 2018-04-19 DIAGNOSIS — B34.9 ACUTE BRONCHOSPASM DUE TO VIRAL INFECTION: ICD-10-CM

## 2018-04-19 DIAGNOSIS — J98.01 ACUTE BRONCHOSPASM DUE TO VIRAL INFECTION: ICD-10-CM

## 2018-04-19 DIAGNOSIS — J06.9 VIRAL UPPER RESPIRATORY TRACT INFECTION WITH COUGH: ICD-10-CM

## 2018-04-19 DIAGNOSIS — R05.9 COUGH: ICD-10-CM

## 2018-04-19 PROCEDURE — 71046 X-RAY EXAM CHEST 2 VIEWS: CPT | Mod: TC | Performed by: NURSE PRACTITIONER

## 2018-04-19 PROCEDURE — 99214 OFFICE O/P EST MOD 30 MIN: CPT | Performed by: NURSE PRACTITIONER

## 2018-04-19 RX ORDER — BENZONATATE 100 MG/1
100 CAPSULE ORAL 3 TIMES DAILY PRN
Qty: 60 CAP | Refills: 0 | Status: SHIPPED | OUTPATIENT
Start: 2018-04-19 | End: 2019-06-05

## 2018-04-19 RX ORDER — CODEINE PHOSPHATE AND GUAIFENESIN 10; 100 MG/5ML; MG/5ML
5 SOLUTION ORAL
Qty: 120 ML | Refills: 0 | Status: SHIPPED | OUTPATIENT
Start: 2018-04-19 | End: 2018-04-29

## 2018-04-19 RX ORDER — METHYLPREDNISOLONE 4 MG/1
4 TABLET ORAL DAILY
Qty: 1 KIT | Refills: 0 | Status: SHIPPED | OUTPATIENT
Start: 2018-04-19 | End: 2019-06-05

## 2018-04-19 NOTE — PROGRESS NOTES
"Chief Complaint   Patient presents with   • Cough     \"chest congestion,sob,fatigue,sinus congestion\"x5days        HISTORY OF PRESENT ILLNESS: Patient is a 47 y.o. male who presents today due to symptoms that started five days ago. Pt reports a dry cough without blood in sputum. Reports associated mild sore throat, nasal congestion, sinus pressure, wheezing. Denies chest pain, shortness of breath, or fever. Admits to h/o exercise induced asthma. Denies h/o copd/CAP. No immunocompromise. Has tried OTC cold medications without significant relief of symptoms. He takes doxycycline 100 mg daily for acne. His family members are ill with similar URI symptoms. No other aggravating or alleviating factors.     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    Current Outpatient Prescriptions Ordered in [facility]   Medication Sig Dispense Refill   • MethylPREDNISolone (MEDROL DOSEPAK) 4 MG Tablet Therapy Pack Take 1 Tab by mouth every day. Take with food. 1 Kit 0   • benzonatate (TESSALON) 100 MG Cap Take 1 Cap by mouth 3 times a day as needed for Cough. 60 Cap 0   • guaifenesin-codeine (ROBITUSSIN AC) Solution oral solution Take 5 mL by mouth at bedtime as needed for up to 10 days. Do not drive, work, drink alcohol or engaged in potentially hazardous activity while taking this medication. 120 mL 0   • albuterol 108 (90 Base) MCG/ACT Aero Soln inhalation aerosol Inhale 2 Puffs by mouth every 6 hours as needed for Shortness of Breath. 8.5 g 6   • doxycycline (VIBRAMYCIN) 100 MG Tab TAKE 1 TABLET BY MOUTH " EVERY DAY. 90 Tab 0   • omeprazole (PRILOSEC) 20 MG delayed-release capsule TAKE 1 CAP BY MOUTH EVERY DAY. 90 Cap 0   • lisinopril (PRINIVIL, ZESTRIL) 40 MG tablet TAKE 1 TABLET BY MOUTH EVERY DAY. 90 Tab 0   • testosterone cypionate (DEPO-TESTOSTERONE) 200 MG/ML Solution injection Inject 0.6 mL IM every 7 days. 10 mL 1   • ranitidine (ZANTAC) 150 MG Tab Take 1 Tab by mouth 2 times a day. 20 Tab 0   • tretinoin (RETIN-A) 0.025 % cream Apply daily after cleansing. 45 g 3     No current [facility]-ordered facility-administered medications on file.        Past Medical History:   Diagnosis Date   • Anxiety    • Asthma    • GERD (gastroesophageal reflux disease)    • Hypertension    • Migraine        Social History   Substance Use Topics   • Smoking status: Former Smoker     Quit date: 2002   • Smokeless tobacco: Former User   • Alcohol use 1.8 oz/week     1 Glasses of wine, 1 Cans of beer, 1 Shots of liquor per week      Comment: weekly       Family Status   Relation Status   • Mother    • Father Alive   • Sister Alive   • Sister Alive   • Sister Alive   • Paternal Uncle Alive   • Maternal Grandfather    • Paternal Grandfather      Family History   Problem Relation Age of Onset   • Cancer Mother      colon   • Cancer Father      Prostate   • No Known Problems Sister    • No Known Problems Sister    • No Known Problems Sister    • Cancer Paternal Uncle      Unknown   • Cancer Maternal Grandfather      Lung   • Cancer Paternal Grandfather      Throat       ROS:  Review of Systems   Constitutional: Positive for malaise, fatigue. Negative for weight loss and fever.  HENT: Positive for congestion, sinus pressure, and sore throat. Negative for ear pain, nosebleeds, and neck pain.    Eyes: Negative for vision changes.   Cardiovascular: Negative for chest pain, palpitations, orthopnea and leg swelling.   Respiratory: Positive for cough and sputum production. Negative for shortness of breath and  "wheezing.   Gastrointestinal: Negative for abdominal pain, nausea, vomiting or diarrhea.   Skin: Negative for rash, diaphoresis.     Exam:  Blood pressure 110/88, pulse 76, temperature 36.4 °C (97.5 °F), resp. rate 16, height 1.778 m (5' 10\"), weight 94.6 kg (208 lb 8.9 oz), SpO2 96 %.  General: well-nourished, well-developed male in NAD  Head: normocephalic, atraumatic  Eyes: PERRLA, EOM within normal limits, no conjunctival injection, no scleral icterus, visual fields and acuity grossly intact.  Ears: normal shape and symmetry, no tenderness, no discharge. External canals are without any significant edema or erythema. Tympanic membranes are without any inflammation, no effusion. Gross auditory acuity is intact.  Nose: symmetrical without tenderness, mild discharge, erythema present bilateral nares.  Mouth/Throat: reasonable hygiene, no exudates or tonsillar enlargement. Erythema present.   Neck: no masses, range of motion within normal limits, no tracheal deviation.  Lymph: mild cervical adenopathy. No supraclavicular adenopathy.   Neuro: alert and oriented. Cranial nerves 1-12 grossly intact.   Cardiovascular: regular rate and rhythm without murmurs, rubs, or gallops. No edema.   Pulmonary: no distress. Chest is symmetrical with respiration, no wheezes, crackles, or rhonchi.   Musculoskeletal: appropriate muscle tone, gait is stable.  Skin: warm, dry, intact, no clubbing, no cyanosis.   Psych: appropriate mood, affect, judgement.         Assessment/Plan:  1. Viral upper respiratory tract infection with cough  DX-CHEST-2 VIEWS    benzonatate (TESSALON) 100 MG Cap    guaifenesin-codeine (ROBITUSSIN AC) Solution oral solution   2. Acute bronchospasm due to viral infection  MethylPREDNISolone (MEDROL DOSEPAK) 4 MG Tablet Therapy Pack       DX chest reviewed by myself, radiology reading \"No active disease.\"        Discussed symptoms most likely viral, self limiting illness. Did not see any evidence of a bacterial " process. Discussed natural progression and sx care. Medrol as directed. Tessalon and Robitussin AC for cough, sedative effects of medication discussed with patient. Home albuterol as needed. Rest, increase fluids, hand and respiratory hygiene.   Supportive care, differential diagnoses, and indications for immediate follow-up discussed with patient.   Pathogenesis of diagnosis discussed including typical length and natural progression.  Instructed to return to clinic or nearest emergency department for any change in condition, further concerns, or worsening of symptoms.  Patient states understanding of the plan of care and discharge instructions.  Instructed to make an appointment with their primary care provider in the next 3-7 days if not significantly improving and for further care.         Please note that this dictation was created using voice recognition software. I have made every reasonable attempt to correct obvious errors, but I expect that there are errors of grammar and possibly content that I did not discover before finalizing the note.      IRENE Zapata.

## 2018-05-07 ENCOUNTER — TELEPHONE (OUTPATIENT)
Dept: MEDICAL GROUP | Facility: PHYSICIAN GROUP | Age: 48
End: 2018-05-07

## 2018-05-07 ENCOUNTER — OFFICE VISIT (OUTPATIENT)
Dept: ENDOCRINOLOGY | Facility: MEDICAL CENTER | Age: 48
End: 2018-05-07
Payer: COMMERCIAL

## 2018-05-07 ENCOUNTER — PATIENT MESSAGE (OUTPATIENT)
Dept: MEDICAL GROUP | Facility: PHYSICIAN GROUP | Age: 48
End: 2018-05-07

## 2018-05-07 VITALS
SYSTOLIC BLOOD PRESSURE: 129 MMHG | WEIGHT: 203 LBS | HEART RATE: 90 BPM | OXYGEN SATURATION: 96 % | BODY MASS INDEX: 29.06 KG/M2 | DIASTOLIC BLOOD PRESSURE: 92 MMHG | HEIGHT: 70 IN

## 2018-05-07 DIAGNOSIS — M10.9 GOUT, UNSPECIFIED CAUSE, UNSPECIFIED CHRONICITY, UNSPECIFIED SITE: ICD-10-CM

## 2018-05-07 DIAGNOSIS — E29.1 MALE HYPOGONADISM: ICD-10-CM

## 2018-05-07 PROCEDURE — 99204 OFFICE O/P NEW MOD 45 MIN: CPT | Performed by: INTERNAL MEDICINE

## 2018-05-07 NOTE — TELEPHONE ENCOUNTER
Patient's wife just presented to the desk and informed me that [facility] provided her with a new rx.  She states patient has had mood swings since all of this and apologized.  I did inform her that I would just note no rx is needed at this time as [facility] replaced the rx.

## 2018-05-07 NOTE — PROGRESS NOTES
New Patient Consult Note  Primary care provider: ARA Hunt    Reason for consult: Hypogonadism    HPI:  Neil Abdi Jr. is a 47 y.o. old patient who comes in today for evaluation of hypogonadism. He was initially diagnosed with hypogonadism about 2-3 years ago. He was found to have a low testosterone level of about 180. He was having issues with loss of libido, fatigue, weight gain, night sweats. He was started on intramuscular testosterone, with significant improvement in his symptoms. Currently he is taking intramuscular testosterone 200 mg per mL, 0.55 ML every 7 days. Labs in March showed testosterone level slightly higher than goal, and that time he was doing 0.6 mL every 7 days. Overall he feels better but still has some good swings. Historically he has done better when his total testosterone level is in the range of 600-650. He denies any vision issues. He denies frequent headaches. No history of fertility issues.    ROS:  Constitutional: No unintentional weight loss  Cardiac: No palpitations or racing heart  Resp: No shortness of breath  All other systems were reviewed and were negative.    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    Social History:  Social History     Social History   • Marital status:      Spouse name: N/A   • Number of children: 0   • Years of education: N/A     Occupational History   • Not on  file.     Social History Main Topics   • Smoking status: Former Smoker     Quit date: 7/31/2002   • Smokeless tobacco: Former User   • Alcohol use 1.8 oz/week     1 Glasses of wine, 1 Cans of beer, 1 Shots of liquor per week      Comment: weekly   • Drug use: No   • Sexual activity: Yes     Partners: Female     Other Topics Concern   • Not on file     Social History Narrative   • No narrative on file       Family History:  Family History   Problem Relation Age of Onset   • Cancer Mother      colon   • Cancer Father      Prostate   • No Known Problems Sister    • No Known Problems Sister    • No Known Problems Sister    • Cancer Paternal Uncle      Unknown   • Cancer Maternal Grandfather      Lung   • Cancer Paternal Grandfather      Throat       Medications:    Current Outpatient Prescriptions:   •  Fexofenadine HCl (ALLEGRA ALLERGY PO), Take  by mouth., Disp: , Rfl:   •  testosterone cypionate (DEPO-TESTOSTERONE) 200 MG/ML Solution injection, Inject 0.6 mL IM every 7 days., Disp: 10 mL, Rfl: 1  •  doxycycline (VIBRAMYCIN) 100 MG Tab, TAKE 1 TABLET BY MOUTH EVERY DAY., Disp: 90 Tab, Rfl: 0  •  omeprazole (PRILOSEC) 20 MG delayed-release capsule, TAKE 1 CAP BY MOUTH EVERY DAY., Disp: 90 Cap, Rfl: 0  •  MethylPREDNISolone (MEDROL DOSEPAK) 4 MG Tablet Therapy Pack, Take 1 Tab by mouth every day. Take with food., Disp: 1 Kit, Rfl: 0  •  benzonatate (TESSALON) 100 MG Cap, Take 1 Cap by mouth 3 times a day as needed for Cough., Disp: 60 Cap, Rfl: 0  •  albuterol 108 (90 Base) MCG/ACT Aero Soln inhalation aerosol, Inhale 2 Puffs by mouth every 6 hours as needed for Shortness of Breath., Disp: 8.5 g, Rfl: 6  •  lisinopril (PRINIVIL, ZESTRIL) 40 MG tablet, TAKE 1 TABLET BY MOUTH EVERY DAY., Disp: 90 Tab, Rfl: 0  •  ranitidine (ZANTAC) 150 MG Tab, Take 1 Tab by mouth 2 times a day., Disp: 20 Tab, Rfl: 0  •  tretinoin (RETIN-A) 0.025 % cream, Apply daily after cleansing., Disp: 45 g, Rfl: 3    Physical Examination:  Vital  "signs: /92   Pulse 90   Ht 1.778 m (5' 10\")   Wt 92.1 kg (203 lb)   SpO2 96%   BMI 29.13 kg/m²   General: No apparent distress, cooperative  Eyes: No scleral icterus or discharge  ENMT: Normal on external inspection of nose, lips  Neck: No abnormal masses on inspection  Resp: Normal effort, clear to auscultation bilaterally   CVS: Regular rate and rhythm, S1 S2 normal, no murmur   Extremities: No edema  Neuro: Alert and oriented  Skin: No rash  Psych: Normal mood and affect    Assessment and Plan:    1. Male hypogonadism  · This was initially diagnosed about 2-3 years ago, has been on intramuscular testosterone for about 2-1/2 years  · Advised to lower testosterone dose to 100 mg every 7 days, to aim for total testosterone level of close to 600  · We discussed about pros and cons of testosterone replacement  · Obtain labs now: TSH, free T4, prolactin, PSA, CBC  · Repeat testosterone levels in 4-6 weeks, and then again in 6 months   - TSH; Future  - FREE THYROXINE; Future  - PROLACTIN; Future  - PROSTATE SPECIFIC AG DIAGNOSTIC; Future  - CBC WITHOUT DIFFERENTIAL; Future  - TESTOSTERONE F&T MALE ADULT; Future  - TESTOSTERONE F&T MALE ADULT; Future    2. Gout, unspecified cause, unspecified chronicity, unspecified site  · He reports history of gout and would like to get his uric acid level checked, I ordered uric acid level and advised him to follow with his primary care provider reg gout  - URIC ACID; Future    Return in about 6 months (around 11/7/2018).    Thank you for allowing me to participate in the care of this patient.    Mac Escudero M.D.  05/07/18    CC:   Oswald Stinson, A.P.R.N.    This note was created using voice recognition software (Dragon). The accuracy of the dictation is limited by the abilities of the software. I have reviewed the note prior to signing, however some errors in grammar and context are still possible. If you have any questions related to this note please do " not hesitate to contact our office.

## 2018-05-07 NOTE — TELEPHONE ENCOUNTER
Patient's wife presented to the  with an old bottle of testosterone.  It appears to have crystalized.  Wife brought this back to [facility] pharmacy however since she picked it up months ago and it has been sitting in her bathroom they will not replace.  States patient saw endocrinology today and they are suggesting he change doses to 5.5mL due to recent testosterone level, however told patient he should request from PCP.  Wife states if we change the dose [facility].

## 2018-05-30 ENCOUNTER — HOSPITAL ENCOUNTER (OUTPATIENT)
Dept: LAB | Facility: MEDICAL CENTER | Age: 48
End: 2018-05-30
Attending: INTERNAL MEDICINE
Payer: COMMERCIAL

## 2018-05-30 DIAGNOSIS — R22.1 LUMP IN NECK: ICD-10-CM

## 2018-05-30 DIAGNOSIS — E29.1 MALE HYPOGONADISM: ICD-10-CM

## 2018-05-30 DIAGNOSIS — M10.9 GOUT, UNSPECIFIED CAUSE, UNSPECIFIED CHRONICITY, UNSPECIFIED SITE: ICD-10-CM

## 2018-05-30 LAB
ALBUMIN SERPL BCP-MCNC: 4.2 G/DL (ref 3.2–4.9)
ALBUMIN/GLOB SERPL: 1.6 G/DL
ALP SERPL-CCNC: 59 U/L (ref 30–99)
ALT SERPL-CCNC: 62 U/L (ref 2–50)
ANION GAP SERPL CALC-SCNC: 10 MMOL/L (ref 0–11.9)
AST SERPL-CCNC: 29 U/L (ref 12–45)
BILIRUB SERPL-MCNC: 0.3 MG/DL (ref 0.1–1.5)
BUN SERPL-MCNC: 10 MG/DL (ref 8–22)
CALCIUM SERPL-MCNC: 8.6 MG/DL (ref 8.5–10.5)
CHLORIDE SERPL-SCNC: 110 MMOL/L (ref 96–112)
CO2 SERPL-SCNC: 21 MMOL/L (ref 20–33)
CREAT SERPL-MCNC: 0.97 MG/DL (ref 0.5–1.4)
ERYTHROCYTE [DISTWIDTH] IN BLOOD BY AUTOMATED COUNT: 43.7 FL (ref 35.9–50)
GLOBULIN SER CALC-MCNC: 2.6 G/DL (ref 1.9–3.5)
GLUCOSE SERPL-MCNC: 87 MG/DL (ref 65–99)
HCT VFR BLD AUTO: 53.5 % (ref 42–52)
HGB BLD-MCNC: 17.8 G/DL (ref 14–18)
MCH RBC QN AUTO: 30 PG (ref 27–33)
MCHC RBC AUTO-ENTMCNC: 33.3 G/DL (ref 33.7–35.3)
MCV RBC AUTO: 90.2 FL (ref 81.4–97.8)
PLATELET # BLD AUTO: 222 K/UL (ref 164–446)
PMV BLD AUTO: 11.6 FL (ref 9–12.9)
POTASSIUM SERPL-SCNC: 3.8 MMOL/L (ref 3.6–5.5)
PROLACTIN SERPL-MCNC: 13.6 NG/ML (ref 2.1–17.7)
PROT SERPL-MCNC: 6.8 G/DL (ref 6–8.2)
PSA SERPL-MCNC: 0.95 NG/ML (ref 0–4)
RBC # BLD AUTO: 5.93 M/UL (ref 4.7–6.1)
SODIUM SERPL-SCNC: 141 MMOL/L (ref 135–145)
T4 FREE SERPL-MCNC: 0.64 NG/DL (ref 0.53–1.43)
TSH SERPL DL<=0.005 MIU/L-ACNC: 2.34 UIU/ML (ref 0.38–5.33)
URATE SERPL-MCNC: 7.1 MG/DL (ref 2.5–8.3)
WBC # BLD AUTO: 9 K/UL (ref 4.8–10.8)

## 2018-05-30 PROCEDURE — 36415 COLL VENOUS BLD VENIPUNCTURE: CPT

## 2018-05-30 PROCEDURE — 84550 ASSAY OF BLOOD/URIC ACID: CPT

## 2018-05-30 PROCEDURE — 84439 ASSAY OF FREE THYROXINE: CPT

## 2018-05-30 PROCEDURE — 84146 ASSAY OF PROLACTIN: CPT

## 2018-05-30 PROCEDURE — 84443 ASSAY THYROID STIM HORMONE: CPT

## 2018-05-30 PROCEDURE — 80053 COMPREHEN METABOLIC PANEL: CPT

## 2018-05-30 PROCEDURE — 85027 COMPLETE CBC AUTOMATED: CPT

## 2018-05-30 PROCEDURE — 84153 ASSAY OF PSA TOTAL: CPT

## 2018-06-01 ENCOUNTER — PATIENT MESSAGE (OUTPATIENT)
Dept: MEDICAL GROUP | Facility: PHYSICIAN GROUP | Age: 48
End: 2018-06-01

## 2018-06-01 DIAGNOSIS — Z88.9 MULTIPLE ALLERGIES: ICD-10-CM

## 2018-06-04 NOTE — TELEPHONE ENCOUNTER
From: Neil Abdi Jr.  To: ARA Hunt  Sent: 6/1/2018 3:08 PM PDT  Subject: Prescription Question    Just realized reply did not go through. Can I please get referral to allergist? This was previous apt: You saw Mac Escudero M.D. on Monday  May 7, 2018. The following issues were  addressed: • Male hypogonadism  ----- Message -----  From: ARA Hunt  Sent: 5/7/2018 11:37 AM PDT  To: Neil Abdi Jr.  Subject: RE: Prescription Question  Who was your appointment today with? Unfortunately we do not handle scheduling for specialists. I can place a referral to an allergy specialist, and they would then have to call you to make an appointment.     Thank you,    Oswald      ----- Message -----   From: Neil Abdi Jr.   Sent: 5/7/2018 10:53 AM PDT   To: ARA Hunt  Subject: Prescription Question    I was advised at Dr traylor today to have you refer me to an allergist. You can call me to discuss dates and times [phone]. Thank you.

## 2018-06-11 DIAGNOSIS — Z88.9 MULTIPLE ALLERGIES: ICD-10-CM

## 2018-06-11 RX ORDER — MONTELUKAST SODIUM 10 MG/1
10 TABLET ORAL DAILY
Qty: 30 TAB | Refills: 0 | Status: SHIPPED | OUTPATIENT
Start: 2018-06-11 | End: 2018-07-08 | Stop reason: SDUPTHER

## 2018-06-13 DIAGNOSIS — L70.0 ACNE VULGARIS: ICD-10-CM

## 2018-06-14 RX ORDER — DOXYCYCLINE HYCLATE 100 MG
TABLET ORAL
Qty: 90 TAB | Refills: 0 | Status: SHIPPED | OUTPATIENT
Start: 2018-06-14 | End: 2019-06-05

## 2018-07-08 DIAGNOSIS — Z88.9 MULTIPLE ALLERGIES: ICD-10-CM

## 2018-07-09 RX ORDER — MONTELUKAST SODIUM 10 MG/1
TABLET ORAL
Qty: 90 TAB | Refills: 0 | Status: SHIPPED | OUTPATIENT
Start: 2018-07-09 | End: 2022-07-28 | Stop reason: SDUPTHER

## 2018-07-10 ENCOUNTER — TELEPHONE (OUTPATIENT)
Dept: HEMATOLOGY ONCOLOGY | Facility: MEDICAL CENTER | Age: 48
End: 2018-07-10

## 2018-07-10 NOTE — TELEPHONE ENCOUNTER
Patient seen in April 2018 for small subcutaneous faint soft tissue density in the posterior-lateral right neck region, differentials includes a mildly enlarged lymph node due to inflammation infection or neoplasm, subcutaneous sebaceous cyst, hematoma, or soft tissue inflammation or infection. Recommendation at that time was to repeat imaging in 3 months. I did contact patient today to discuss follow-up imaging apartment. Patient stated he has been experiencing severe allergies and has been experiencing swollen throat. He has recently established care with an allergist and is undergoing a full panel of testing on August 2. Patient stated he has been taking over-the-counter medications and nothing has been working and believes that the soft tissue mass in the neck is related to severe allergies. Patient stated he has been monitoring this very closely and it has not gotten any worse or bigger over the last 3 months. He does state that he can still feel it is also been experiencing swelling of his throat from allergies. Patient is not interested in proceeding with an imaging at this time but would like to complete full workup with allergist to determine if this is the cause of the subcutaneous soft tissue mass. Patient would like to reevaluate towards the end of the month of August as he will have his results and likely start treatment for allergies in the month of August.

## 2018-07-16 ENCOUNTER — OFFICE VISIT (OUTPATIENT)
Dept: URGENT CARE | Facility: CLINIC | Age: 48
End: 2018-07-16
Payer: COMMERCIAL

## 2018-07-16 VITALS
DIASTOLIC BLOOD PRESSURE: 88 MMHG | SYSTOLIC BLOOD PRESSURE: 126 MMHG | BODY MASS INDEX: 29.2 KG/M2 | WEIGHT: 204 LBS | RESPIRATION RATE: 16 BRPM | HEART RATE: 80 BPM | TEMPERATURE: 98.5 F | HEIGHT: 70 IN | OXYGEN SATURATION: 99 %

## 2018-07-16 DIAGNOSIS — H69.91 DYSFUNCTION OF RIGHT EUSTACHIAN TUBE: ICD-10-CM

## 2018-07-16 DIAGNOSIS — J01.90 ACUTE NON-RECURRENT SINUSITIS, UNSPECIFIED LOCATION: ICD-10-CM

## 2018-07-16 DIAGNOSIS — H92.01 OTALGIA OF RIGHT EAR: ICD-10-CM

## 2018-07-16 PROCEDURE — 99214 OFFICE O/P EST MOD 30 MIN: CPT | Performed by: NURSE PRACTITIONER

## 2018-07-16 RX ORDER — FLUTICASONE PROPIONATE 50 MCG
1 SPRAY, SUSPENSION (ML) NASAL DAILY
COMMUNITY
End: 2019-06-10

## 2018-07-16 RX ORDER — AZELASTINE 1 MG/ML
2 SPRAY, METERED NASAL 2 TIMES DAILY
Refills: 3 | COMMUNITY
Start: 2018-06-20 | End: 2023-05-09

## 2018-07-16 RX ORDER — AMOXICILLIN AND CLAVULANATE POTASSIUM 875; 125 MG/1; MG/1
1 TABLET, FILM COATED ORAL 2 TIMES DAILY
Qty: 14 TAB | Refills: 0 | Status: SHIPPED | OUTPATIENT
Start: 2018-07-16 | End: 2018-07-23

## 2018-07-16 ASSESSMENT — ENCOUNTER SYMPTOMS
HOARSE VOICE: 1
SHORTNESS OF BREATH: 0
ABDOMINAL PAIN: 0
SWOLLEN GLANDS: 0
VOMITING: 0
HEADACHES: 1
COUGH: 0
SORE THROAT: 1
NECK PAIN: 0
RHINORRHEA: 0
DIAPHORESIS: 0
CHILLS: 1
SINUS PRESSURE: 1

## 2018-07-16 NOTE — PROGRESS NOTES
Subjective:      Neil Abdi Jr. is a 47 y.o. male who presents with Otalgia (x 4 months, pain on Rt. ear pain.    x 4 days, swelling and discharge) and Sinus Problem (x 4 months, nasal congestion, pressure on Rt. side of face, stuffy and runny nose, headaches, post nasal drip)        PFSH reviewed and updated as necessary in EPIC electronic record with patient today.  Medications including OTC medications reviewed with patient.         No Known Allergies      Sinus Problem   This is a new problem. The current episode started more than 1 month ago. The problem has been rapidly worsening since onset. There has been no fever. His pain is at a severity of 6/10. The pain is moderate. Associated symptoms include chills, congestion, ear pain, headaches, a hoarse voice, sinus pressure, sneezing and a sore throat. Pertinent negatives include no coughing, diaphoresis, neck pain, shortness of breath or swollen glands. Past treatments include saline sprays (allergy medication, humidifier). The treatment provided no relief.   Otalgia    There is pain in the right ear. This is a new problem. The current episode started in the past 7 days. The problem occurs every few hours. The problem has been gradually worsening. There has been no fever. The pain is at a severity of 6/10. The pain is moderate. Associated symptoms include headaches and a sore throat. Pertinent negatives include no abdominal pain, coughing, ear discharge, neck pain, rhinorrhea or vomiting. He has tried acetaminophen and heat packs (ear lavages) for the symptoms. The treatment provided mild relief. There is no history of a chronic ear infection, hearing loss or a tympanostomy tube.        Review of Systems   Constitutional: Positive for chills. Negative for diaphoresis.   HENT: Positive for congestion, ear pain, hoarse voice, sinus pressure, sneezing and sore throat. Negative for ear discharge and rhinorrhea.    Respiratory: Negative for cough and  "shortness of breath.    Gastrointestinal: Negative for abdominal pain and vomiting.   Musculoskeletal: Negative for neck pain.   Neurological: Positive for headaches.          Objective:     /88   Pulse 80   Temp 36.9 °C (98.5 °F)   Resp 16   Ht 1.778 m (5' 10\")   Wt 92.5 kg (204 lb)   SpO2 99%   BMI 29.27 kg/m²      Physical Exam   Constitutional: Vital signs are normal. He appears well-developed and well-nourished. He is cooperative.  Non-toxic appearance. He does not have a sickly appearance.   HENT:   Head: Normocephalic.   Right Ear: Hearing, external ear and ear canal normal. Tympanic membrane is injected and bulging.   Left Ear: Hearing, tympanic membrane, external ear and ear canal normal.   Ears:    Nose: Mucosal edema present. No nasal deformity. Right sinus exhibits maxillary sinus tenderness and frontal sinus tenderness. Left sinus exhibits no maxillary sinus tenderness and no frontal sinus tenderness.   Mouth/Throat: Uvula is midline and mucous membranes are normal. Oropharyngeal exudate (purulent PND) present. No posterior oropharyngeal edema, posterior oropharyngeal erythema or tonsillar abscesses.   Eyes: Conjunctivae and lids are normal. Pupils are equal, round, and reactive to light.   Neck: Trachea normal, normal range of motion and full passive range of motion without pain. Neck supple.   Cardiovascular: Normal rate, regular rhythm, intact distal pulses and normal pulses.  PMI is not displaced.    Pulmonary/Chest: Effort normal and breath sounds normal.   Abdominal: Soft.   Lymphadenopathy:        Head (right side): No submental, no submandibular and no tonsillar adenopathy present.        Head (left side): No submental, no submandibular and no tonsillar adenopathy present.     He has no cervical adenopathy.        Right: No supraclavicular adenopathy present.        Left: No supraclavicular adenopathy present.   Neurological: He is alert.   Skin: Skin is warm, dry and intact.   "   Psychiatric: He has a normal mood and affect. His speech is normal.   Nursing note and vitals reviewed.              Assessment/Plan:     1. Acute non-recurrent sinusitis, unspecified location  amoxicillin-clavulanate (AUGMENTIN) 875-125 MG Tab   2. Dysfunction of right eustachian tube     3. Otalgia of right ear       FU with PCP or return to Urgent Care in 5-7 days if no improvement in symptoms, sooner if increased or worsening symptoms.   Humidifier at noc may be beneficial.   OTC antihistamine of choice - non-drowsy. Take as directed by   OTC fluticasone of choice- Take as directed by   Keep well hydrated    Return for reevaluation if she has any of the following symptoms or if current symptoms persist > 10 days:   Fever higher than 102.5°F (39.2°C)   Sudden and severe pain in the face and head   Trouble seeing or seeing double   Trouble thinking clearly   Swelling or redness around 1 or both eyes   Trouble breathing or a stiff neck

## 2018-07-18 ENCOUNTER — HOSPITAL ENCOUNTER (OUTPATIENT)
Dept: LAB | Facility: MEDICAL CENTER | Age: 48
End: 2018-07-18
Attending: INTERNAL MEDICINE
Payer: COMMERCIAL

## 2018-07-18 DIAGNOSIS — E29.1 MALE HYPOGONADISM: ICD-10-CM

## 2018-07-18 PROCEDURE — 84270 ASSAY OF SEX HORMONE GLOBUL: CPT

## 2018-07-18 PROCEDURE — 36415 COLL VENOUS BLD VENIPUNCTURE: CPT

## 2018-07-18 PROCEDURE — 84403 ASSAY OF TOTAL TESTOSTERONE: CPT

## 2018-07-20 LAB
SHBG SERPL-SCNC: 14 NMOL/L (ref 11–80)
TESTOST FREE MFR SERPL: 2.7 % (ref 1.6–2.9)
TESTOST FREE SERPL-MCNC: 129 PG/ML (ref 47–244)
TESTOST SERPL-MCNC: 484 NG/DL (ref 300–890)

## 2018-08-10 DIAGNOSIS — E29.1 MALE HYPOGONADISM: ICD-10-CM

## 2018-08-14 ENCOUNTER — TELEPHONE (OUTPATIENT)
Dept: MEDICAL GROUP | Facility: PHYSICIAN GROUP | Age: 48
End: 2018-08-14

## 2018-08-14 NOTE — TELEPHONE ENCOUNTER
Pt's wife came into the clinic today requesting that her  needs a Rx refill for BD needles size 20 as he is completely out. If you have any questions please call the patient.

## 2018-08-15 DIAGNOSIS — E29.1 MALE HYPOGONADISM: ICD-10-CM

## 2018-08-15 NOTE — TELEPHONE ENCOUNTER
VOICEMAIL  1. Caller Name: Neil                  Call Back Number: [phone] (home)     2. Message: Patient LVM stating that he sees a specialist for the testosterone medication and we are to disregard refill requests.  He states he has advised the pharmacy of this several times.     3. Patient approves office to leave a detailed voicemail/MyChart message: no

## 2018-08-17 DIAGNOSIS — E29.1 MALE HYPOGONADISM: ICD-10-CM

## 2018-08-17 RX ORDER — TESTOSTERONE CYPIONATE 200 MG/ML
100 INJECTION, SOLUTION INTRAMUSCULAR
Qty: 10 ML | Refills: 0 | Status: SHIPPED | OUTPATIENT
Start: 2018-08-17 | End: 2019-01-21 | Stop reason: SDUPTHER

## 2018-08-28 DIAGNOSIS — E29.1 MALE HYPOGONADISM: ICD-10-CM

## 2018-08-30 DIAGNOSIS — R22.1 LUMP IN NECK: ICD-10-CM

## 2018-08-31 ENCOUNTER — TELEPHONE (OUTPATIENT)
Dept: HEMATOLOGY ONCOLOGY | Facility: MEDICAL CENTER | Age: 48
End: 2018-08-31

## 2018-08-31 NOTE — TELEPHONE ENCOUNTER
----- Message from PORSHA Sanchez sent at 8/30/2018  7:11 AM PDT -----  Regarding: Follow up  Please call patient before the end of the week and let him know he is due for a repeat CT of the neck to evaluate the lymph nodees. I called him in July and he wanted to wait until he saw an allergist and had work-up done. He said he wanted to wait until the end of the month. So I placed an order for a CT neck and please schedule that for him and I will call with the results if he is in agreement.

## 2018-09-06 NOTE — TELEPHONE ENCOUNTER
Patient stated that he just started his allergy shots this week and would like to wait on the CT Scan for a little longer. Patient stated he is getting multiple shots a week to get to a maintenance dose in 9 weeks versus 1 year.

## 2018-11-02 ENCOUNTER — TELEPHONE (OUTPATIENT)
Dept: HEMATOLOGY ONCOLOGY | Facility: MEDICAL CENTER | Age: 48
End: 2018-11-02

## 2018-11-02 NOTE — TELEPHONE ENCOUNTER
----- Message from PORSHA Sanchez sent at 11/1/2018  5:00 PM PDT -----  See if he is ready for CT scan now? Order in chart if so, so you can schedule

## 2018-11-02 NOTE — TELEPHONE ENCOUNTER
I called patient to see if he is ready to do the CT Scan. Patient stated he would like [facility] [place] [facility] and a little later in the afternoon. Patient asked to send a mY Chart message with all of the information.

## 2018-11-30 ENCOUNTER — HOSPITAL ENCOUNTER (OUTPATIENT)
Dept: RADIOLOGY | Facility: MEDICAL CENTER | Age: 48
End: 2018-11-30
Attending: NURSE PRACTITIONER
Payer: COMMERCIAL

## 2018-11-30 DIAGNOSIS — R22.1 LUMP IN NECK: ICD-10-CM

## 2018-11-30 PROCEDURE — 70491 CT SOFT TISSUE NECK W/DYE: CPT

## 2018-12-03 ENCOUNTER — TELEPHONE (OUTPATIENT)
Dept: HEMATOLOGY ONCOLOGY | Facility: MEDICAL CENTER | Age: 48
End: 2018-12-03

## 2018-12-04 NOTE — TELEPHONE ENCOUNTER
Patient had a repeat CT scan of the neck with contrast.  Patient was seen back in March 27, 2018 for lump in the lower right neck area.  He had a CT scan completed around that time which showed a small subcutaneous faint soft tissue density in the posterior lateral right neck region measuring 1.5 x 0.7 x 0.9 cm in greatest dimension.  Plan at that time was to proceed with a 3-month CT scan in follow-up.  Unfortunately patient was undergoing multiple other medical issues and continue to decline on a CT scan until recently.  He most recently proceeded with a CT scan on November 30, 2018, approximate 8 months later.  There is no change in a slightly lobulated soft tissue mass just below the skin surface in the right lower lateral neck area.  I did review the results in detail with Dr. Craven as well.  I contacted the patient this evening regarding the results letting him know that everything was stable.    At this time the slightly lobulated soft tissue mass is very stable over the last 8 months.  There is no further follow-up needed with the IOC.  I did discuss this with the patient in detail this evening.  He is to follow-up with his primary care provider for any further needs.      Ct-soft Tissue Neck With    Result Date: 11/30/2018 11/30/2018 4:11 PM HISTORY/REASON FOR EXAM:  Lump in lower right neck area near shoulder. TECHNIQUE/EXAM DESCRIPTION AND NUMBER OF VIEWS:  CT soft tissue neck with contrast. Helical scanning was obtained from the skull base through the thoracic inlet.  80 mL of Optiray 300 nonionic contrast was administered in an IV bolus fashion without complication. Low dose optimization technique was utilized for this CT exam including automated exposure control and adjustment of the mA and/or kV according to patient size. COMPARISON: 04/04/2018 FINDINGS: There is no evidence of skull base mass. There is no significant sinus disease. The pharynx and larynx are normal.  There is no evidence of mass.   No evidence of retropharyngeal or peritonsillar abscess. Slightly lobulated appearing subcutaneous lesion is identified in the posterior right lateral lower neck area measuring 1.5 x 0.9 x 0.7 cm in size. This lesion is noted at site of palpable abnormality and appears unchanged compared to the prior CT. No masses are seen within the lung apices.     1.  No change in slightly lobulated soft tissue mass just below the skin surface in the right lower lateral neck area since the prior CT. Size and appearance of the lesion are unchanged. This lesion once again does correspond to the palpable abnormality. 2.  No new abnormalities have developed since the prior examination.

## 2019-01-04 ENCOUNTER — OFFICE VISIT (OUTPATIENT)
Dept: URGENT CARE | Facility: CLINIC | Age: 49
End: 2019-01-04
Payer: COMMERCIAL

## 2019-01-04 VITALS
HEART RATE: 64 BPM | RESPIRATION RATE: 16 BRPM | HEIGHT: 70 IN | TEMPERATURE: 98 F | DIASTOLIC BLOOD PRESSURE: 84 MMHG | WEIGHT: 212 LBS | SYSTOLIC BLOOD PRESSURE: 150 MMHG | OXYGEN SATURATION: 95 % | BODY MASS INDEX: 30.35 KG/M2

## 2019-01-04 DIAGNOSIS — R09.A2 GLOBUS SENSATION: ICD-10-CM

## 2019-01-04 PROCEDURE — 99213 OFFICE O/P EST LOW 20 MIN: CPT | Performed by: NURSE PRACTITIONER

## 2019-01-04 ASSESSMENT — ENCOUNTER SYMPTOMS
FEVER: 0
CHILLS: 0
TINGLING: 0
VOMITING: 0
MYALGIAS: 0
HEADACHES: 0

## 2019-01-05 NOTE — PROGRESS NOTES
"Subjective:      Neil Abdi Jr. is a 48 y.o. male who presents with Dysphagia (trouble clearing throat, \"feels like something is stuck\" x 9 months)            HPI New problem. 48 year old male here for 9 month history of feeling like something is stuck in his throat. He has been seen and scoped by ENT with no diagnosis as they cannot find anything. States feels like lump in throat. Denies sore ness. In exam room constantly clearing throat. He is very frustrated. I have advised that we are limited in our ability to diagnose possible structural defect as we are only urgent care.   Patient has no known allergies.  Current Outpatient Prescriptions on File Prior to Visit   Medication Sig Dispense Refill   • syringe 1 ML Misc For testosterone injections every week 20 Each 1   • NEEDLE, DISP, 23 G 23G X 1-1/2\" Misc To inject testosterone, for intramuscular testosterone injections every week 20 Each 1   • NEEDLE, DISP, 18 G 18G X 1-1/2\" Misc To draw testosterone, for testosterone injections every week 20 Each 1   • azelastine (ASTELIN) 137 MCG/SPRAY nasal spray [place] 2 Sprays in nose 2 Times a Day. Each Nostril  3   • fluticasone (FLONASE) 50 MCG/ACT nasal spray Spray 1 Spray in nose every day.     • montelukast (SINGULAIR) 10 MG Tab TAKE 1 TABLET BY MOUTH EVERY DAY 90 Tab 0   • doxycycline (VIBRAMYCIN) 100 MG Tab TAKE 1 TABLET BY MOUTH EVERY DAY. 90 Tab 0   • omeprazole (PRILOSEC) 20 MG delayed-release capsule TAKE 1 CAP BY MOUTH EVERY DAY. 90 Cap 3   • Fexofenadine HCl (ALLEGRA ALLERGY PO) Take  by mouth.     • MethylPREDNISolone (MEDROL DOSEPAK) 4 MG Tablet Therapy Pack Take 1 Tab by mouth every day. Take with food. 1 Kit 0   • benzonatate (TESSALON) 100 MG Cap Take 1 Cap by mouth 3 times a day as needed for Cough. 60 Cap 0   • albuterol 108 (90 Base) MCG/ACT Aero Soln inhalation aerosol Inhale 2 Puffs by mouth every 6 hours as needed for Shortness of Breath. 8.5 g 6   • lisinopril (PRINIVIL, ZESTRIL) 40 MG " "tablet TAKE 1 TABLET BY MOUTH EVERY DAY. 90 Tab 0   • ranitidine (ZANTAC) 150 MG Tab Take 1 Tab by mouth 2 times a day. 20 Tab 0   • tretinoin (RETIN-A) 0.025 % cream Apply daily after cleansing. 45 g 3     No current facility-administered medications on file prior to visit.      Social History     Social History   • Marital status:      Spouse name: N/A   • Number of children: 0   • Years of education: N/A     Occupational History   • Not on file.     Social History Main Topics   • Smoking status: Former Smoker     Quit date: 7/31/2002   • Smokeless tobacco: Former User   • Alcohol use 1.8 oz/week     1 Glasses of wine, 1 Cans of beer, 1 Shots of liquor per week      Comment: weekly   • Drug use: No   • Sexual activity: Yes     Partners: Female     Other Topics Concern   • Not on file     Social History Narrative   • No narrative on file     family history includes Cancer in his father, maternal grandfather, mother, paternal grandfather, and paternal uncle; No Known Problems in his sister, sister, and sister.      Review of Systems   Constitutional: Negative for chills, fever and malaise/fatigue.   HENT:        Globus feeling in throat.   Gastrointestinal: Negative for vomiting.   Musculoskeletal: Negative for myalgias.   Neurological: Negative for tingling and headaches.          Objective:     /84 (BP Location: Right arm, Patient Position: Sitting, BP Cuff Size: Adult)   Pulse 64   Temp 36.7 °C (98 °F) (Temporal)   Resp 16   Ht 1.778 m (5' 10\")   Wt 96.2 kg (212 lb)   SpO2 95%   BMI 30.42 kg/m²      Physical Exam   Constitutional: He is oriented to person, place, and time. He appears well-developed and well-nourished. No distress.   HENT:   Head: Normocephalic and atraumatic.   Right Ear: External ear and ear canal normal. Tympanic membrane is not injected and not perforated. No middle ear effusion.   Left Ear: External ear and ear canal normal. Tympanic membrane is not injected and not " perforated.  No middle ear effusion.   Nose: No mucosal edema.   Mouth/Throat: No uvula swelling. No oropharyngeal exudate, posterior oropharyngeal edema, posterior oropharyngeal erythema or tonsillar abscesses.   Eyes: Conjunctivae are normal. Right eye exhibits no discharge. Left eye exhibits no discharge.   Neck: Normal range of motion. Neck supple.   Cardiovascular: Normal rate, regular rhythm and normal heart sounds.    No murmur heard.  Pulmonary/Chest: Effort normal and breath sounds normal. No respiratory distress.   Musculoskeletal: Normal range of motion.   Normal movement of all 4 extremities.   Lymphadenopathy:     He has no cervical adenopathy.        Right: No supraclavicular adenopathy present.        Left: No supraclavicular adenopathy present.   Neurological: He is alert and oriented to person, place, and time. Gait normal.   Skin: Skin is warm and dry.   Psychiatric: He has a normal mood and affect. His behavior is normal. Thought content normal.   Nursing note and vitals reviewed.              Assessment/Plan:     1. Globus sensation       At this time patient is very agitated and irritated so I am not comfortable suggesting that this may be anxiety related. Advised follow up with ENT or PCP for this as I cannot see anything on exam and at this point it is referral out. Patient thinks he has food stuck in a hole but again I have advised that urgent care is not equipped for this type of evaluation. He was very discouraged on discharge.

## 2019-01-21 DIAGNOSIS — E29.1 MALE HYPOGONADISM: ICD-10-CM

## 2019-01-21 RX ORDER — TESTOSTERONE CYPIONATE 200 MG/ML
100 INJECTION, SOLUTION INTRAMUSCULAR
Qty: 10 ML | Refills: 0 | Status: SHIPPED
Start: 2019-01-21 | End: 2019-06-05 | Stop reason: SDUPTHER

## 2019-01-21 NOTE — TELEPHONE ENCOUNTER
----- Message from Neil Abdi Jr. sent at 1/20/2019  8:44 PM PST -----  Regarding: Prescription Question  Contact: [phone]  Need refill on Testosterone.   I am stable at .5 mL dose.  Specific brand being filled at [facility].  [facility].  I have 3 weeks left.

## 2019-01-21 NOTE — TELEPHONE ENCOUNTER
Was the patient seen in the last year in this department? Yes    Does patient have an active prescription for medications requested? No     Received Request Via: Pharmacy       AGUEDA IKE     Age: 48  demographics  Data as of: 01/21/2019              NARCOTIC 060        SEDATIVE 030       STIMULANT 000       NARxSCORES can range from 000 to 999. The first two digits represent the composite percentile risk based on an overall analysis of prescription drug use. The third digit represents the number of active prescriptions. The distribution of scores in the population is such that approximately 75% fall below 200, 95% fall below 500 and 99% fall below 650. The information on this report is not warranted as accurate or complete. This report is based on the search criteria supplied and the data entered by the dispensing pharmacy. For more information about any prescription, please contact the dispensing pharmacy or the prescriber. NARxSCORES and Reports are intended to aid, not replace medical decision making. None of the information presented should be used as sole justification for providing or refusing to provide medications.       Rx Graph Grayed out drugs could not be included in score calculations.   [x] Narcotic [x] Sedative [x] Stimulant [x] Buprenorphine [x] Other    Created with Raphaël 2.2.0All Prescribers  Created with Raphaël [id number] - Mac Escudero, 2 - Oswald Mccord B3 - Emma Lamb4 - John Escamilla5 - Esequiel Worthy6 - Sherif Weeks  Morphine MgEq (MME)  Created with Raphaël [id number]  Created with Raphaël [id number]  *Per CDC guidance, the MME conversion factors prescribed or provided as part of the medication-assisted treatment for opioid use disorder should not be used to benchmark against dosage thresholds meant for opioids prescribed for pain. Buprenorphine products have no agreed upon morphine equivalency, and as partial opioid agonists, are not  expected to be associated with overdose risk in the same dose-dependent manner as doses for full agonist opioids. MME = morphine milligram equivalents. LME = Lorazepam milligram equivalents. mg = dose in milligrams.     Data Analysis   Narcotics (060) 2 months 6 months 1 year 2 years   Prescribers (narcotic, sedative) 0  0 0  0 1  8 3  16   Pharmacies (narcotic, sedative) 0  0 0  0 1  13 1  10   Morphine mg 0  0 0  0 36  13 208  38   Morphine overlap (1) 0  0 0  0 0  0 0  0           Sedatives (030) 2 months 6 months 1 year 2 years   Prescribers (narcotic, sedative) 0  0 0  0 1  8 3  16   Pharmacies (narcotic, sedative) 0  0 0  0 1  13 1  10   Sedative mg 0  0 0  0 0  0 0  0   Sedative overlap (1) 0  0 0  0 0  0 0  0           Stimulants (000)  2 months 6 months 1 year 2 years   Prescribers (stimulant) 0  0 0  0 0  0 0  0   Pharmacies (stimulant) 0  0 0  0 0  0 0  0   Stimulant days 0  0 0  0 0  0 0  0   Stimulant overlap (1) 0  0 0  0 0  0 0  0      (1) Number of days for which a simliar type of medication was prescribed from different prescribers for use on the same day.         Summary   Total Prescriptions: 10   Total Prescribers: 6   Total Pharmacies: 2   Narcotics*    (excluding buprenorphine)  Current Qty: 0   Current MME/day: 0.00   30 Day Avg MME/day: 0.00   Buprenorphine*   Current Qty: 0   Current mg/day: 0.00   30 Day Avg mg/day: 0.00   Sedatives*   Current Qty: 0   Current LME/day: 0.00   30 Day Avg LME/day: 0.00   *Highlighted drugs could not be included in score calculations   PRESCRIPTIONS  Total Prescriptions: 10   Total Private Pay: 1   Fill Date ID Written Drug Qty Days Prescriber Rx # Pharmacy Refill Daily Dose * Pymt Type    08/17/2018  1   08/17/2018  Testosterone Cyp 200 Mg/Ml  10 90 Am Le  [id number] Joel ([id number])  0  [place]icaid  [place]   05/07/2018  1   03/20/2018  Testosterone Cyp 200 Mg/Ml  10 90 Ja [facility]  [id number] Joel ([id number])  1  [place]  [place]    04/19/2018  1   04/19/2018  Guaifenesin-Codeine Syrup  120 10 Er Hic  [id number] Joel ([id number])  0 3.60 MME  [place]  [place]   03/20/2018  1   03/20/2018  Testosteron Cyp 2,000 Mg/10 Ml  10 90 Ja Rosana  [id number] Joel ([id number])  0  [place]  [place]   11/01/2017  1   10/26/2017  Testosteron Cyp 2,000 Mg/10 Ml  10 90 Ja [place]ana  [id number] Joel ([id number])  0  [place]  [place]   07/20/2017  1   07/20/2017  Oxycodone-Acetaminophen 5-325  15 3 Ma Adrianna  [id number] Joel ([id number])  0 37.50 MME  [place]icaid  [place]   07/10/2017  1   07/10/2017  Testosteron Cyp 2,000 Mg/10 Ml  10 28 Ja Rosana  [id number] Joel ([id number])  0  [place]  [place]   07/06/2017  1   07/06/2017  Testosterone Cyp 200 Mg/Ml  1 1 Ja Rosana  [id number] Joel ([id number])  0  [place]  [place]   03/13/2017  2   03/10/2017  Testosteron Cyp 2,000 Mg/10 Ml  10 70 Ra Seg  [id number] Renny ([id number])  0  [place]  [place]   01/28/2017  1   01/28/2017  Hydrocodone-Acetamin 5-325 MG  12 3 Re Wee  [id number] Joel ([id number])  0 20.00 MME  [place]icaid  [place]   *Per CDC guidance, the MME conversion factors prescribed or provided as part of the medication-assisted treatment for opioid use disorder should not be used to benchmark against dosage thresholds meant for opioids prescribed for pain. Buprenorphine products have no agreed upon morphine equivalency, and as partial opioid agonists, are not expected to be associated with overdose risk in the same dose-dependent manner as doses for full agonist opioids. MME = morphine milligram equivalents. LME = Lorazepam milligram equivalents. MG = dose in milligrams.  PROVIDERS  Total Providers: 6   Name Address [facility] [place] Zipcode Phone   Sherif Weeks [address] [place] [address]    Esequiel Zaynab [address] [facility] [address]    Oswald Stinson [address] [place] [address]    John Escamilla [address] [place] [address]    Emma Lamb [address] [place] [address]    Mac Escudero M.D. [address] [facility] [address]    PHARMACIES  Total Pharmacies: 2   Name Address [facility] Zipcode Phone   [facility] [phone] [place] [address] [phone]   [facility]  [phone]) [address] [place] [address] [phone]

## 2019-05-17 ENCOUNTER — TELEPHONE (OUTPATIENT)
Dept: MEDICAL GROUP | Facility: MEDICAL CENTER | Age: 49
End: 2019-05-17

## 2019-05-17 DIAGNOSIS — I10 ESSENTIAL HYPERTENSION: ICD-10-CM

## 2019-05-17 RX ORDER — LISINOPRIL 40 MG/1
40 TABLET ORAL
Qty: 30 TAB | Refills: 0 | Status: SHIPPED | OUTPATIENT
Start: 2019-05-17 | End: 2019-06-05 | Stop reason: SDUPTHER

## 2019-05-18 NOTE — TELEPHONE ENCOUNTER
On-call coverage.  Patient states he needs a refill of his lisinopril.  In review of the chart he has not been seen his primary care provider since March 2018.  I told the patient I will send a 30-day supply of lisinopril to his pharmacy at [facility] he needs to call his primary care provider for further refills and to schedule an appointment.

## 2019-06-05 ENCOUNTER — PATIENT MESSAGE (OUTPATIENT)
Dept: MEDICAL GROUP | Facility: PHYSICIAN GROUP | Age: 49
End: 2019-06-05

## 2019-06-05 ENCOUNTER — OFFICE VISIT (OUTPATIENT)
Dept: MEDICAL GROUP | Facility: PHYSICIAN GROUP | Age: 49
End: 2019-06-05
Payer: COMMERCIAL

## 2019-06-05 VITALS
RESPIRATION RATE: 12 BRPM | OXYGEN SATURATION: 96 % | TEMPERATURE: 97.9 F | HEART RATE: 64 BPM | BODY MASS INDEX: 29.78 KG/M2 | SYSTOLIC BLOOD PRESSURE: 124 MMHG | HEIGHT: 70 IN | WEIGHT: 208 LBS | DIASTOLIC BLOOD PRESSURE: 84 MMHG

## 2019-06-05 DIAGNOSIS — E78.2 MIXED HYPERLIPIDEMIA: ICD-10-CM

## 2019-06-05 DIAGNOSIS — E29.1 MALE HYPOGONADISM: ICD-10-CM

## 2019-06-05 DIAGNOSIS — M1A.9XX0 CHRONIC GOUT INVOLVING TOE OF LEFT FOOT WITHOUT TOPHUS, UNSPECIFIED CAUSE: ICD-10-CM

## 2019-06-05 DIAGNOSIS — R79.89 LOW TESTOSTERONE IN MALE: ICD-10-CM

## 2019-06-05 DIAGNOSIS — Z88.9 MULTIPLE ALLERGIES: ICD-10-CM

## 2019-06-05 DIAGNOSIS — I10 ESSENTIAL HYPERTENSION: ICD-10-CM

## 2019-06-05 DIAGNOSIS — J45.990 EXERCISE-INDUCED ASTHMA: ICD-10-CM

## 2019-06-05 DIAGNOSIS — K21.9 GASTROESOPHAGEAL REFLUX DISEASE WITHOUT ESOPHAGITIS: ICD-10-CM

## 2019-06-05 DIAGNOSIS — Z00.00 ROUTINE MEDICAL EXAM: ICD-10-CM

## 2019-06-05 PROCEDURE — 99214 OFFICE O/P EST MOD 30 MIN: CPT | Performed by: NURSE PRACTITIONER

## 2019-06-05 RX ORDER — TESTOSTERONE CYPIONATE 200 MG/ML
100 INJECTION, SOLUTION INTRAMUSCULAR
Qty: 10 ML | Refills: 0 | Status: SHIPPED | OUTPATIENT
Start: 2019-06-05 | End: 2019-06-10 | Stop reason: SDUPTHER

## 2019-06-05 RX ORDER — LISINOPRIL 40 MG/1
40 TABLET ORAL
Qty: 90 TAB | Refills: 3 | Status: SHIPPED | OUTPATIENT
Start: 2019-06-05 | End: 2019-06-13 | Stop reason: SDUPTHER

## 2019-06-05 RX ORDER — AMOXICILLIN AND CLAVULANATE POTASSIUM 875; 125 MG/1; MG/1
TABLET, FILM COATED ORAL
Refills: 0 | COMMUNITY
Start: 2019-04-04 | End: 2019-06-05

## 2019-06-05 RX ORDER — TESTOSTERONE CYPIONATE 200 MG/ML
INJECTION, SOLUTION INTRAMUSCULAR
Qty: 10 ML | Refills: 0 | OUTPATIENT
Start: 2019-06-05

## 2019-06-05 RX ORDER — NEEDLES, DISPOSABLE 25GX5/8"
NEEDLE, DISPOSABLE MISCELLANEOUS
Refills: 0 | COMMUNITY
Start: 2019-05-16 | End: 2019-06-10 | Stop reason: SDUPTHER

## 2019-06-05 ASSESSMENT — PATIENT HEALTH QUESTIONNAIRE - PHQ9: CLINICAL INTERPRETATION OF PHQ2 SCORE: 0

## 2019-06-05 NOTE — PROGRESS NOTES
Chief Complaint   Patient presents with   • Orders Needed     Testosterone; gout        HISTORY OF THE PRESENT ILLNESS: This is a 48 y.o. male established patient who presents today to request for labs and for follow up of his medical problems.    Essential hypertension  Chronic. Patient is currently on lisinopril. Blood pressure in clinic today was 124/84. No reports of medication side effects or associated symptoms regarding hypertension.     Gastroesophageal reflux disease without esophagitis  Chronic. Patient has acid reflux as well as chronic throat irritation. He currently takes omeprazole regularly. States his allergist put him on zantac at some point in the past which did not seem to help at the time. The allergist recently told him that his current throat irritation may be due to the reflux and therefore recently put him on Zantac again. He has been taking both the zantac and the omeprazole and states this regimen has been helping with the GERD. However, he notes that his allergist informed him that it is not good to be on omeprazole long term and therefore advised him to discuss these medications at this visit.  He mentions that being exposed to direct air conditioning seems to trigger the throat irritation. Patient notes he had a laryngoscopy done in the past but has never had an endoscopy.    Chronic gout involving toe of left foot without tophus, unspecified cause  Chronic. Patient reports having a couple of flare-ups but not to the point that he cannot walk like when he had gout for the first time. He is experiencing left foot cramping and is unsure if it is related to gout. He requests to have lab orders to check for gout.    Mixed hyperlipidemia  Chronic. Patient previously declined taking statin medications. He is supposed to be working on his diet and exercise. No reports of active associated symptoms regarding hyperlipidemia.    Low testosterone in male  Chronic. Patient has chronic mild fatigue.  Patient states he was previously seen by a specialist for this who put him on testosterone. Patient states he was been given generic testosterone and not the name brand for a period of time. He reports having issues with the generic testosterone congealing, making him suspect possible decreased effectiveness of the medication. He thinks that this issue may have caused him to have low testosterone and therefore wants to get labs ordered. States he is now on a name brand testosterone and is no longer having the congealing issues. He does not have an upcoming appointment with endocrinology yet as he wants to wait for lab results first.    Exercise-induced asthma  Chronic and stable. Patient states his breathing has been good overall. Denies any wheezing.    Multiple allergies  Chronic. Patient states he continues to get allergy shots.       Past Medical History:   Diagnosis Date   • Anxiety    • Asthma    • GERD (gastroesophageal reflux disease)    • Hypertension    • Migraine        Past Surgical History:   Procedure Laterality Date   • LAMINOTOMY         Family Status   Relation Status   • Mo    • Fa Alive   • Sis Alive   • Sis Alive   • Sis Alive   • PUnc Alive   • MGFa    • PGFa      Family History   Problem Relation Age of Onset   • Cancer Mother         colon   • Cancer Father         Prostate   • No Known Problems Sister    • No Known Problems Sister    • No Known Problems Sister    • Cancer Paternal Uncle         Unknown   • Cancer Maternal Grandfather         Lung   • Cancer Paternal Grandfather         Throat       Social History   Substance Use Topics   • Smoking status: Former Smoker     Quit date: 2002   • Smokeless tobacco: Former User   • Alcohol use 1.8 oz/week     1 Glasses of wine, 1 Cans of beer, 1 Shots of liquor per week      Comment: weekly       Allergies: Patient has no known allergies.    Current Outpatient Prescriptions Ordered in [facility]   Medication Sig Dispense  "Refill   • BD DISP NEEDLES 22G X 1-1/2\" Misc USE TO INJ TESTOSTERONE IM Q WEEK  0   • lisinopril (PRINIVIL, ZESTRIL) 40 MG tablet Take 1 Tab by mouth every day. 90 Tab 3   • syringe 1 ML Misc For testosterone injections every week 20 Each 1   • NEEDLE, DISP, 23 G 23G X 1-1/2\" Misc To inject testosterone, for intramuscular testosterone injections every week 20 Each 1   • NEEDLE, DISP, 18 G 18G X 1-1/2\" Misc To draw testosterone, for testosterone injections every week 20 Each 1   • azelastine (ASTELIN) 137 MCG/SPRAY nasal spray [place] 2 Sprays in nose 2 Times a Day. Each Nostril  3   • fluticasone (FLONASE) 50 MCG/ACT nasal spray Spray 1 Spray in nose every day.     • montelukast (SINGULAIR) 10 MG Tab TAKE 1 TABLET BY MOUTH EVERY DAY 90 Tab 0   • omeprazole (PRILOSEC) 20 MG delayed-release capsule TAKE 1 CAP BY MOUTH EVERY DAY. 90 Cap 3   • Fexofenadine HCl (ALLEGRA ALLERGY PO) Take  by mouth.     • albuterol 108 (90 Base) MCG/ACT Aero Soln inhalation aerosol Inhale 2 Puffs by mouth every 6 hours as needed for Shortness of Breath. 8.5 g 6   • ranitidine (ZANTAC) 150 MG Tab Take 1 Tab by mouth 2 times a day. 20 Tab 0   • tretinoin (RETIN-A) 0.025 % cream Apply daily after cleansing. 45 g 3     No current [facility]-ordered facility-administered medications on file.        Review of Systems   Constitutional: Mild fatigue. Negative for fever, chills, weight loss   HENT: Chronic throat irritation. Negative for ear pain, nosebleeds, congestion, and neck pain.    Respiratory: Negative for cough, sputum production, shortness of breath and wheezing.    Cardiovascular: Negative for chest pain, palpitations, orthopnea and leg swelling.   Gastrointestinal: Chronic reflux. Negative for nausea, vomiting and abdominal pain.   Musculoskeletal: Left foot cramping. Negative for back pain.  All other systems reviewed and are negative except as in HPI.    Exam: /84 (BP Location: Left arm, Patient Position: Sitting, BP Cuff Size: Adult)  " " Pulse 64   Temp 36.6 °C (97.9 °F) (Temporal)   Resp 12   Ht 1.778 m (5' 10\")   Wt 94.3 kg (208 lb)   SpO2 96%   General:  Normal appearing. No distress.  Pulmonary:  Clear to ausculation.  Normal effort. No rales, ronchi, or wheezing.  Cardiovascular:  Regular rate and rhythm without murmur. Carotid and radial pulses are intact and equal bilaterally.  Neurologic:  Grossly nonfocal  Skin:  Warm and dry.  No obvious lesions.  Musculoskeletal:  Normal gait. No extremity cyanosis, clubbing, or edema.  Psych:  Normal mood and affect. Alert and oriented x3. Judgment and insight is normal.    PLAN:    1. Essential hypertension  - Stable on current regimen. Continue current plan of care and medications. Refilling lisinopril.  - lisinopril (PRINIVIL, ZESTRIL) 40 MG tablet; Take 1 Tab by mouth every day.  Dispense: 90 Tab; Refill: 3    2. Gastroesophageal reflux disease without esophagitis  - Patient was recently put on zantac by his allergist. Informed him it is not good to be on omeprazole long term. He agrees to wean off of the omeprazole. Gave him instructions on weaning off. Advised him to spread out when he takes the medication by first taking it every other day, then every other three days, then a few times per week until he is off. Will continue to monitor.  - Informed him if the acid reflux does not get better, we will consider putting him on Pepcid and referring him to GI for further evaluation and possible endoscopy    3. Chronic gout involving toe of left foot without tophus, unspecified cause  - Patient thinks he might be having another gout episode in which he has had foot cramping. Ordered labs  - URIC ACID; Future    4. Mixed hyperlipidemia  - Stable. Patient previously declined taking a statin. He is supposed to be working on diet and exercise. Ordered updated labs  - Lipid Profile; Future    5. Low testosterone in male  - Patient requests for labs to recheck testosterone levels. Continue current plan " of care and medications.   - TESTOSTERONE LC-MS/MX BIO/SHBG; Future    6. Exercise-induced asthma  - Stable. Continue current plan of care and medications.     7. Multiple allergies  - Stable. Continue current plan of care. Patient states he gets allergy shots    8. Routine medical exam  - Ordered routine labs.  - CBC WITH DIFFERENTIAL; Future  - Comp Metabolic Panel; Future  - Lipid Profile; Future     Follow-up for annual next week. Patient is encouraged to be seen in the emergency room for chest pain, palpitations, shortness of breath, dizziness, severe abdominal pain or other concerning symptoms.     Please note that this dictation was created using voice recognition software. I have made every reasonable attempt to correct obvious errors, but I expect that there are errors of grammar and possibly content that I did not discover before finalizing the note.      Assessment/Plan  1. Essential hypertension  lisinopril (PRINIVIL, ZESTRIL) 40 MG tablet   2. Gastroesophageal reflux disease without esophagitis     3. Chronic gout involving toe of left foot without tophus, unspecified cause  URIC ACID   4. Mixed hyperlipidemia  Lipid Profile   5. Low testosterone in male  TESTOSTERONE LC-MS/MX BIO/SHBG   6. Exercise-induced asthma     7. Multiple allergies     8. Routine medical exam  CBC WITH DIFFERENTIAL    Comp Metabolic Panel    Lipid Profile         I have placed the below orders and discussed them with an approved delegating provider. The MA is performing the below orders under the direction of Dr. Terry.       Juvenal ROMERO (Scribe), am scribing for, and in the presence of, ARLENE Merino    Electronically signed by: Juvenal Deutsch (Lavinia), 6/5/2019    Oswald ROMERO APRN personally performed the services described in this documentation, as scribed by Juvenal Deutsch in my presence, and it is both accurate and complete.

## 2019-06-05 NOTE — TELEPHONE ENCOUNTER
Was the patient seen in the last year in this department? No last seen 5/8/19 with Kota- made apt on 6/10    Does patient have an active prescription for medications requested? No     Received Request Via: Patient

## 2019-06-09 NOTE — PROGRESS NOTES
"Endocrinology Clinic Progress Note  PCP: ARA Hunt    HPI:  Neil Abdi Jr. is a 48 y.o. old patient who comes in today for review of endocrine problems.  This is a patient that was previously being seen by Dr. Escudero.  He will have labs drawn on Wednesday.    Hypogonadism:  He is currently taking Testosterone Cypionate 100 mg every 7 days.  His lab done on 5/30/18 shows a Hemoglobin of 17.8 and Hematocrit of 53.5.  Feeling good.  He takes his injection every Sunday.    Hypertension:  Currently taking Lisinopril 40 mg daily.       ROS:  Constitutional: No unintentional weight loss  Endo: Denies excessive thirst or frequent urination  All other systems were reviewed and were negative.    Past Medical History:  Patient Active Problem List    Diagnosis Date Noted   • Lump in neck 03/12/2018   • Acne vulgaris 03/07/2017   • Chronic gout involving toe of left foot without tophus 03/07/2017   • Mixed hyperlipidemia 03/07/2017   • Essential hypertension 02/16/2017   • Gastroesophageal reflux disease without esophagitis 02/16/2017   • Multiple allergies 02/16/2017   • Low testosterone in male 02/16/2017   • Exercise-induced asthma 02/16/2017       Medications:    Current Outpatient Prescriptions:   •  BD DISP NEEDLES 22G X 1-1/2\" Misc, 1 Each by Intramuscular route every 7 days., Disp: 50 Each, Rfl: 0  •  syringe (B-D SYRINGE LUER-MYCHAL 1CC) 1 ML Misc, 1 mL by Does not apply route every 7 days., Disp: 50 Each, Rfl: 0  •  testosterone cypionate (DEPO-TESTOSTERONE) 200 MG/ML Solution injection, 0.5 mL by Intramuscular route every 7 days for 140 days. From [facility] only. Medically necessary., Disp: 10 mL, Rfl: 0  •  lisinopril (PRINIVIL, ZESTRIL) 40 MG tablet, Take 1 Tab by mouth every day., Disp: 90 Tab, Rfl: 3  •  azelastine (ASTELIN) 137 MCG/SPRAY nasal spray, Spray 2 Sprays in nose 2 Times a Day. Each Nostril, Disp: , Rfl: 3  •  montelukast (SINGULAIR) 10 MG Tab, TAKE 1 " "TABLET BY MOUTH EVERY DAY, Disp: 90 Tab, Rfl: 0  •  omeprazole (PRILOSEC) 20 MG delayed-release capsule, TAKE 1 CAP BY MOUTH EVERY DAY., Disp: 90 Cap, Rfl: 3  •  Fexofenadine HCl (ALLEGRA ALLERGY PO), Take  by mouth., Disp: , Rfl:   •  albuterol 108 (90 Base) MCG/ACT Aero Soln inhalation aerosol, Inhale 2 Puffs by mouth every 6 hours as needed for Shortness of Breath., Disp: 8.5 g, Rfl: 6    Labs: Reviewed    Physical Examination:  Vital signs: /80   Pulse 92   Ht 1.778 m (5' 10\")   Wt 94.3 kg (208 lb)   SpO2 98%   BMI 29.84 kg/m²  Body mass index is 29.84 kg/m². Patient's body mass index is 29.84 kg/m². Exercise and nutrition counseling were performed at this visit.  Walking and shooting in the hills.  General: No apparent distress, cooperative  Eyes: No scleral icterus, no discharge, normal eyelids  Neck: No abnormal masses on inspection, normal thyroid exam  Resp: Normal effort, clear to auscultation bilaterally  CVS: Regular rate and rhythm, S1 S2 normal, no murmur  Extremities: No lower extremity edema  Abdomen: abdominal obesity present  Musculoskeletal: Normal digits and nails  Skin: No rash on visible skin  Psych: Alert and oriented, normal mood and affect, intact memory and able to make informed decisions.    Assessment and Plan:    1. Male hypogonadism  Continue testosterone therapy.    2. High risk medication use  He does have erythrocytosis secondary to most likely testosterone supplementation as the contributory factor.  He will need therapeutic phlebotomy and will fax over the orders to the infusion center    3. Essential hypertension  Controlled    Return in about 1 month (around 7/10/2019).    Thank you for allowing me to participate in the care of this patient.    Margarito Youssef  This note was scribed by Tamar Humphreys RN CDE  CC:   Oswald Stinson, A.P.R.N.    This note was created using voice recognition software (Dragon). The accuracy of the dictation is limited by the " abilities of the software. I have reviewed the note prior to signing, however some errors in grammar and context are still possible. If you have any questions related to this note please do not hesitate to contact our office.

## 2019-06-10 ENCOUNTER — OFFICE VISIT (OUTPATIENT)
Dept: ENDOCRINOLOGY | Facility: MEDICAL CENTER | Age: 49
End: 2019-06-10
Payer: COMMERCIAL

## 2019-06-10 ENCOUNTER — APPOINTMENT (OUTPATIENT)
Dept: ENDOCRINOLOGY | Facility: MEDICAL CENTER | Age: 49
End: 2019-06-10
Payer: COMMERCIAL

## 2019-06-10 VITALS
WEIGHT: 208 LBS | DIASTOLIC BLOOD PRESSURE: 80 MMHG | SYSTOLIC BLOOD PRESSURE: 122 MMHG | OXYGEN SATURATION: 98 % | BODY MASS INDEX: 29.78 KG/M2 | HEART RATE: 92 BPM | HEIGHT: 70 IN

## 2019-06-10 DIAGNOSIS — I10 ESSENTIAL HYPERTENSION: ICD-10-CM

## 2019-06-10 DIAGNOSIS — E53.8 VITAMIN B 12 DEFICIENCY: ICD-10-CM

## 2019-06-10 DIAGNOSIS — E29.1 MALE HYPOGONADISM: ICD-10-CM

## 2019-06-10 DIAGNOSIS — E03.9 HYPOTHYROIDISM, UNSPECIFIED TYPE: ICD-10-CM

## 2019-06-10 DIAGNOSIS — Z79.899 HIGH RISK MEDICATION USE: ICD-10-CM

## 2019-06-10 DIAGNOSIS — E55.9 VITAMIN D DEFICIENCY: ICD-10-CM

## 2019-06-10 PROCEDURE — 99214 OFFICE O/P EST MOD 30 MIN: CPT | Performed by: INTERNAL MEDICINE

## 2019-06-10 RX ORDER — TESTOSTERONE CYPIONATE 200 MG/ML
100 INJECTION, SOLUTION INTRAMUSCULAR
Qty: 10 ML | Refills: 0 | Status: SHIPPED | OUTPATIENT
Start: 2019-06-10 | End: 2019-10-22 | Stop reason: SDUPTHER

## 2019-06-10 RX ORDER — NEEDLES, DISPOSABLE 25GX5/8"
1 NEEDLE, DISPOSABLE MISCELLANEOUS
Qty: 50 EACH | Refills: 0 | Status: SHIPPED | OUTPATIENT
Start: 2019-06-10 | End: 2020-05-26 | Stop reason: SDUPTHER

## 2019-06-12 ENCOUNTER — HOSPITAL ENCOUNTER (OUTPATIENT)
Dept: LAB | Facility: MEDICAL CENTER | Age: 49
End: 2019-06-12
Attending: INTERNAL MEDICINE
Payer: COMMERCIAL

## 2019-06-12 ENCOUNTER — HOSPITAL ENCOUNTER (OUTPATIENT)
Dept: LAB | Facility: MEDICAL CENTER | Age: 49
End: 2019-06-12
Attending: NURSE PRACTITIONER
Payer: COMMERCIAL

## 2019-06-12 DIAGNOSIS — E55.9 VITAMIN D DEFICIENCY: ICD-10-CM

## 2019-06-12 DIAGNOSIS — M1A.9XX0 CHRONIC GOUT INVOLVING TOE OF LEFT FOOT WITHOUT TOPHUS, UNSPECIFIED CAUSE: ICD-10-CM

## 2019-06-12 DIAGNOSIS — Z00.00 ROUTINE MEDICAL EXAM: ICD-10-CM

## 2019-06-12 DIAGNOSIS — Z79.899 HIGH RISK MEDICATION USE: ICD-10-CM

## 2019-06-12 DIAGNOSIS — E29.1 MALE HYPOGONADISM: ICD-10-CM

## 2019-06-12 DIAGNOSIS — I10 ESSENTIAL HYPERTENSION: ICD-10-CM

## 2019-06-12 DIAGNOSIS — R79.89 LOW TESTOSTERONE IN MALE: ICD-10-CM

## 2019-06-12 DIAGNOSIS — E03.9 HYPOTHYROIDISM, UNSPECIFIED TYPE: ICD-10-CM

## 2019-06-12 DIAGNOSIS — E53.8 VITAMIN B 12 DEFICIENCY: ICD-10-CM

## 2019-06-12 DIAGNOSIS — E78.2 MIXED HYPERLIPIDEMIA: ICD-10-CM

## 2019-06-12 LAB
ALBUMIN SERPL BCP-MCNC: 4.5 G/DL (ref 3.2–4.9)
ALBUMIN/GLOB SERPL: 1.4 G/DL
ALP SERPL-CCNC: 70 U/L (ref 30–99)
ALT SERPL-CCNC: 70 U/L (ref 2–50)
ANION GAP SERPL CALC-SCNC: 8 MMOL/L (ref 0–11.9)
AST SERPL-CCNC: 35 U/L (ref 12–45)
BASOPHILS # BLD AUTO: 0.6 % (ref 0–1.8)
BASOPHILS # BLD: 0.06 K/UL (ref 0–0.12)
BILIRUB SERPL-MCNC: 0.9 MG/DL (ref 0.1–1.5)
BUN SERPL-MCNC: 12 MG/DL (ref 8–22)
CALCIUM SERPL-MCNC: 9.1 MG/DL (ref 8.5–10.5)
CHLORIDE SERPL-SCNC: 107 MMOL/L (ref 96–112)
CHOLEST SERPL-MCNC: 166 MG/DL (ref 100–199)
CO2 SERPL-SCNC: 24 MMOL/L (ref 20–33)
CREAT SERPL-MCNC: 1.16 MG/DL (ref 0.5–1.4)
EOSINOPHIL # BLD AUTO: 0.32 K/UL (ref 0–0.51)
EOSINOPHIL NFR BLD: 3.2 % (ref 0–6.9)
ERYTHROCYTE [DISTWIDTH] IN BLOOD BY AUTOMATED COUNT: 41 FL (ref 35.9–50)
ERYTHROCYTE [DISTWIDTH] IN BLOOD BY AUTOMATED COUNT: 41.1 FL (ref 35.9–50)
FASTING STATUS PATIENT QL REPORTED: NORMAL
GLOBULIN SER CALC-MCNC: 3.2 G/DL (ref 1.9–3.5)
GLUCOSE SERPL-MCNC: 108 MG/DL (ref 65–99)
HCT VFR BLD AUTO: 56.5 % (ref 42–52)
HCT VFR BLD AUTO: 56.8 % (ref 42–52)
HDLC SERPL-MCNC: 34 MG/DL
HGB BLD-MCNC: 18.9 G/DL (ref 14–18)
HGB BLD-MCNC: 19 G/DL (ref 14–18)
IMM GRANULOCYTES # BLD AUTO: 0.04 K/UL (ref 0–0.11)
IMM GRANULOCYTES NFR BLD AUTO: 0.4 % (ref 0–0.9)
LDLC SERPL CALC-MCNC: 89 MG/DL
LYMPHOCYTES # BLD AUTO: 1.38 K/UL (ref 1–4.8)
LYMPHOCYTES NFR BLD: 13.7 % (ref 22–41)
MCH RBC QN AUTO: 29.4 PG (ref 27–33)
MCH RBC QN AUTO: 29.5 PG (ref 27–33)
MCHC RBC AUTO-ENTMCNC: 33.5 G/DL (ref 33.7–35.3)
MCHC RBC AUTO-ENTMCNC: 33.5 G/DL (ref 33.7–35.3)
MCV RBC AUTO: 87.9 FL (ref 81.4–97.8)
MCV RBC AUTO: 88.3 FL (ref 81.4–97.8)
MONOCYTES # BLD AUTO: 1.18 K/UL (ref 0–0.85)
MONOCYTES NFR BLD AUTO: 11.7 % (ref 0–13.4)
NEUTROPHILS # BLD AUTO: 7.09 K/UL (ref 1.82–7.42)
NEUTROPHILS NFR BLD: 70.4 % (ref 44–72)
NRBC # BLD AUTO: 0 K/UL
NRBC BLD-RTO: 0 /100 WBC
PLATELET # BLD AUTO: 205 K/UL (ref 164–446)
PLATELET # BLD AUTO: 209 K/UL (ref 164–446)
PMV BLD AUTO: 11.3 FL (ref 9–12.9)
PMV BLD AUTO: 11.3 FL (ref 9–12.9)
POTASSIUM SERPL-SCNC: 4 MMOL/L (ref 3.6–5.5)
PROT SERPL-MCNC: 7.7 G/DL (ref 6–8.2)
RBC # BLD AUTO: 6.4 M/UL (ref 4.7–6.1)
RBC # BLD AUTO: 6.46 M/UL (ref 4.7–6.1)
SODIUM SERPL-SCNC: 139 MMOL/L (ref 135–145)
TRIGL SERPL-MCNC: 214 MG/DL (ref 0–149)
URATE SERPL-MCNC: 8.1 MG/DL (ref 2.5–8.3)
WBC # BLD AUTO: 10.1 K/UL (ref 4.8–10.8)
WBC # BLD AUTO: 9.8 K/UL (ref 4.8–10.8)

## 2019-06-12 PROCEDURE — 84403 ASSAY OF TOTAL TESTOSTERONE: CPT

## 2019-06-12 PROCEDURE — 84550 ASSAY OF BLOOD/URIC ACID: CPT

## 2019-06-12 PROCEDURE — 80053 COMPREHEN METABOLIC PANEL: CPT

## 2019-06-12 PROCEDURE — 84270 ASSAY OF SEX HORMONE GLOBUL: CPT

## 2019-06-12 PROCEDURE — 86376 MICROSOMAL ANTIBODY EACH: CPT

## 2019-06-12 PROCEDURE — 84439 ASSAY OF FREE THYROXINE: CPT

## 2019-06-12 PROCEDURE — 84480 ASSAY TRIIODOTHYRONINE (T3): CPT

## 2019-06-12 PROCEDURE — 85027 COMPLETE CBC AUTOMATED: CPT

## 2019-06-12 PROCEDURE — 80061 LIPID PANEL: CPT

## 2019-06-12 PROCEDURE — 82607 VITAMIN B-12: CPT

## 2019-06-12 PROCEDURE — 82306 VITAMIN D 25 HYDROXY: CPT

## 2019-06-12 PROCEDURE — 84481 FREE ASSAY (FT-3): CPT

## 2019-06-12 PROCEDURE — 84443 ASSAY THYROID STIM HORMONE: CPT

## 2019-06-12 PROCEDURE — 36415 COLL VENOUS BLD VENIPUNCTURE: CPT

## 2019-06-12 PROCEDURE — 85025 COMPLETE CBC W/AUTO DIFF WBC: CPT

## 2019-06-12 PROCEDURE — 84270 ASSAY OF SEX HORMONE GLOBUL: CPT | Mod: 91

## 2019-06-13 ENCOUNTER — PATIENT MESSAGE (OUTPATIENT)
Dept: ENDOCRINOLOGY | Facility: MEDICAL CENTER | Age: 49
End: 2019-06-13

## 2019-06-13 ENCOUNTER — TELEPHONE (OUTPATIENT)
Dept: ENDOCRINOLOGY | Facility: MEDICAL CENTER | Age: 49
End: 2019-06-13

## 2019-06-13 ENCOUNTER — OFFICE VISIT (OUTPATIENT)
Dept: MEDICAL GROUP | Facility: PHYSICIAN GROUP | Age: 49
End: 2019-06-13
Payer: COMMERCIAL

## 2019-06-13 VITALS
RESPIRATION RATE: 16 BRPM | HEART RATE: 116 BPM | WEIGHT: 207 LBS | TEMPERATURE: 98.7 F | OXYGEN SATURATION: 96 % | HEIGHT: 70 IN | SYSTOLIC BLOOD PRESSURE: 144 MMHG | BODY MASS INDEX: 29.63 KG/M2 | DIASTOLIC BLOOD PRESSURE: 98 MMHG

## 2019-06-13 DIAGNOSIS — K21.9 GASTROESOPHAGEAL REFLUX DISEASE WITHOUT ESOPHAGITIS: ICD-10-CM

## 2019-06-13 DIAGNOSIS — I10 ESSENTIAL HYPERTENSION: ICD-10-CM

## 2019-06-13 DIAGNOSIS — D58.2 ELEVATED HEMOGLOBIN (HCC): ICD-10-CM

## 2019-06-13 DIAGNOSIS — R73.09 ELEVATED GLUCOSE: ICD-10-CM

## 2019-06-13 DIAGNOSIS — J02.9 SORE THROAT: ICD-10-CM

## 2019-06-13 DIAGNOSIS — J01.00 ACUTE NON-RECURRENT MAXILLARY SINUSITIS: ICD-10-CM

## 2019-06-13 LAB
25(OH)D3 SERPL-MCNC: 21 NG/ML (ref 30–100)
SHBG SERPL-SCNC: 13 NMOL/L (ref 11–80)
T3 SERPL-MCNC: 98.4 NG/DL (ref 60–181)
T3FREE SERPL-MCNC: 3.2 PG/ML (ref 2.4–4.2)
T4 FREE SERPL-MCNC: 0.75 NG/DL (ref 0.53–1.43)
TESTOST SERPL-MCNC: 485 NG/DL (ref 175–781)
THYROPEROXIDASE AB SERPL-ACNC: 0.6 IU/ML (ref 0–9)
TSH SERPL DL<=0.005 MIU/L-ACNC: 2.1 UIU/ML (ref 0.38–5.33)
VIT B12 SERPL-MCNC: 234 PG/ML (ref 211–911)

## 2019-06-13 PROCEDURE — 99214 OFFICE O/P EST MOD 30 MIN: CPT | Performed by: NURSE PRACTITIONER

## 2019-06-13 RX ORDER — AMOXICILLIN AND CLAVULANATE POTASSIUM 875; 125 MG/1; MG/1
1 TABLET, FILM COATED ORAL 2 TIMES DAILY
Qty: 14 TAB | Refills: 0 | Status: SHIPPED | OUTPATIENT
Start: 2019-06-13 | End: 2019-06-20

## 2019-06-13 RX ORDER — LISINOPRIL 40 MG/1
40 TABLET ORAL
Qty: 90 TAB | Refills: 3 | Status: SHIPPED | OUTPATIENT
Start: 2019-06-13 | End: 2020-06-04

## 2019-06-13 NOTE — PROGRESS NOTES
Chief Complaint   Patient presents with   • Pharyngitis     x 3 days        HISTORY OF THE PRESENT ILLNESS: This is a 48 y.o. male established patient who presents today for pharyngitis symptoms and for follow up of his medical problems.    Sore throat  Acute non-recurrent maxillary sinusitis  Patient states he has had a sore throat over the last 3 days. States the sore throat has been progressively worsening. He woke up in the middle of the night with difficulty swallowing secondary to the throat pain. He reports associated sinus pressure, greater on the right side. He also has congestion, headaches, and mild body aches. Denies any chills or cough. He has been taking NyQuil Severe Flu. States his kids at home have bronchitis. He thinks some of them were put on antibiotic medication, possibly amoxicillin. He denies any recent known strep exposures. He still does not smoke.    Essential hypertension  Chronic. Patient is currently on lisinopril. No reports of medication side effects or associated symptoms regarding hypertension. Blood pressure in clinic today was 144/98. Patient states he recently took NyQuil Severe Flu.     Gastroesophageal reflux disease without esophagitis  Chronic and stable at this time. Patient is on omeprazole. No reports of medication side effects or active associated symptoms regarding GERD.     Elevated hemoglobin (HCC)  Recent lab results showed hemoglobin of 18.9, hematocrit 56.5. RBC was 6.40, Lymphocytes noted to be 13.70. Of note, patient is on testosterone managed by endocrinology. No reports of new active associated symptoms.    Elevated glucose  Discussed recent lab results which showed glucose of 108. Patient does not report any active associated symptoms at this time. Patient mentions he has recently decreased his intake of coffee and sweet cream which has helped him with his sleep.      Past Medical History:   Diagnosis Date   • Anxiety    • Asthma    • GERD (gastroesophageal  "reflux disease)    • Hypertension    • Migraine        Past Surgical History:   Procedure Laterality Date   • LAMINOTOMY         Family Status   Relation Status   • Mo    • Fa Alive   • Sis Alive   • Sis Alive   • Sis Alive   • PUnc Alive   • MGFa    • PGFa      Family History   Problem Relation Age of Onset   • Cancer Mother         colon   • Cancer Father         Prostate   • No Known Problems Sister    • No Known Problems Sister    • No Known Problems Sister    • Cancer Paternal Uncle         Unknown   • Cancer Maternal Grandfather         Lung   • Cancer Paternal Grandfather         Throat       Social History   Substance Use Topics   • Smoking status: Former Smoker     Quit date: 2002   • Smokeless tobacco: Former User   • Alcohol use 1.8 oz/week     1 Glasses of wine, 1 Cans of beer, 1 Shots of liquor per week      Comment: weekly       Allergies: Patient has no known allergies.    Current Outpatient Prescriptions Ordered in [facility]   Medication Sig Dispense Refill   • amoxicillin-clavulanate (AUGMENTIN) 875-125 MG Tab Take 1 Tab by mouth 2 times a day for 7 days. 14 Tab 0   • lisinopril (PRINIVIL, ZESTRIL) 40 MG tablet Take 1 Tab by mouth every day. 90 Tab 3   • testosterone cypionate (DEPO-TESTOSTERONE) 200 MG/ML Solution injection 0.5 mL by Intramuscular route every 7 days for 140 days. From [facility] Tins.ly only. Medically necessary. 10 mL 0   • azelastine (ASTELIN) 137 MCG/SPRAY nasal spray [place] 2 Sprays in nose 2 Times a Day. Each Nostril  3   • montelukast (SINGULAIR) 10 MG Tab TAKE 1 TABLET BY MOUTH EVERY DAY 90 Tab 0   • omeprazole (PRILOSEC) 20 MG delayed-release capsule TAKE 1 CAP BY MOUTH EVERY DAY. 90 Cap 3   • Fexofenadine HCl (ALLEGRA ALLERGY PO) Take  by mouth.     • albuterol 108 (90 Base) MCG/ACT Aero Soln inhalation aerosol Inhale 2 Puffs by mouth every 6 hours as needed for Shortness of Breath. 8.5 g 6   • BD DISP NEEDLES 22G X 1-1/2\" Misc 1 Each by " "Intramuscular route every 7 days. 50 Each 0   • syringe (B-D SYRINGE LUER-MYCHAL 1CC) 1 ML Misc 1 mL by Does not apply route every 7 days. 50 Each 0     No current Psychiatric-ordered facility-administered medications on file.        Review of Systems   Constitutional: Negative for fever, chills, weight loss and malaise/fatigue.   HENT: Sore throat, sinus pressure, congestion. Negative for nosebleeds, and neck pain.     Respiratory: Negative for cough, sputum production, shortness of breath and wheezing.    Cardiovascular: Negative for chest pain, palpitations, orthopnea and leg swelling.   Musculoskeletal: Mild body aches.  Neurological: Headaches. Negative for dizziness, tingling, tremors, sensory change, focal weakness.    All other systems reviewed and are negative except as in HPI.    Exam: /98 (BP Location: Left arm, Patient Position: Sitting, BP Cuff Size: Adult)   Pulse (!) 116   Temp 37.1 °C (98.7 °F) (Temporal)   Resp 16   Ht 1.778 m (5' 10\")   Wt 93.9 kg (207 lb)   SpO2 96%   General:  Normal appearing. No distress.  HEENT: Normocephalic. Eyes conjunctiva clear lids without ptosis, pupils equal and reactive to light accommodation, ears normal shape and contour, canals are clear bilaterally, Right TM is bulging, nasal mucosa benign. Posterior oropharynx with redness, tonsillar swelling, exudate on right tonsil. Tenderness to palpation of right maxillary sinus.  Pulmonary:  Clear to ausculation.  Normal effort. No rales, ronchi, or wheezing.  Cardiovascular:  Regular rate and rhythm without murmur. Carotid and radial pulses are intact and equal bilaterally.  Neurologic:  Grossly nonfocal  Lymph: Moderate cervical lymphadenopathy. No supraclavicular or axillary lymph nodes are palpable  Skin:  Warm and dry.  No obvious lesions.  Musculoskeletal:  Normal gait. No extremity cyanosis, clubbing, or edema.  Psych:  Normal mood and affect. Alert and oriented x3. Judgment and insight is normal.    Labs:  Hospital " Outpatient Visit on 06/12/2019   Component Date Value Ref Range Status   • WBC 06/12/2019 9.8  4.8 - 10.8 K/uL Final   • RBC 06/12/2019 6.40* 4.70 - 6.10 M/uL Final   • Hemoglobin 06/12/2019 18.9* 14.0 - 18.0 g/dL Final   • Hematocrit 06/12/2019 56.5* 42.0 - 52.0 % Final   • MCV 06/12/2019 88.3  81.4 - 97.8 fL Final   • MCH 06/12/2019 29.5  27.0 - 33.0 pg Final   • MCHC 06/12/2019 33.5* 33.7 - 35.3 g/dL Final   • RDW 06/12/2019 41.1  35.9 - 50.0 fL Final   • Platelet Count 06/12/2019 209  164 - 446 K/uL Final   • MPV 06/12/2019 11.3  9.0 - 12.9 fL Final   • TSH 06/12/2019 2.100  0.380 - 5.330 uIU/mL Final    Comment: Please note new reference ranges effective 12/14/2017 10:00 AM  Pregnant Females, 1st Trimester  0.050-3.700  Pregnant Females, 2nd Trimester  0.310-4.350  Pregnant Females, 3rd Trimester  0.410-5.180     • T3 06/12/2019 98.4  60.0 - 181.0 ng/dL Final   • T3,Free 06/12/2019 3.20  2.40 - 4.20 pg/mL Final   • Free T-4 06/12/2019 0.75  0.53 - 1.43 ng/dL Final   • Vitamin B12 -True Cobalamin 06/12/2019 234  211 - 911 pg/mL Final   • 25-Hydroxy   Vitamin D 25 06/12/2019 21* 30 - 100 ng/mL Final    Comment: Adult Ranges:   <20 ng/mL - Deficiency  20-29 ng/mL - Insufficiency   ng/mL - Sufficiency  The [employer] Vitamin D Assay is standardized to the  [employer] reference measurement procedures, a  reference method for the Vitamin D Standardization Program  (VDSP).  The VDSP aligns patient results among 25 (OH)  Vitamin D methods.     • Microsomal -Tpo- Abs 06/12/2019 0.6  0.0 - 9.0 IU/mL Final   • Testosterone,Total 06/12/2019 485  175 - 781 ng/dL Final   Hospital Outpatient Visit on 06/12/2019   Component Date Value Ref Range Status   • WBC 06/12/2019 10.1  4.8 - 10.8 K/uL Final   • RBC 06/12/2019 6.46* 4.70 - 6.10 M/uL Final   • Hemoglobin 06/12/2019 19.0* 14.0 - 18.0 g/dL Final   • Hematocrit 06/12/2019 56.8* 42.0 - 52.0 % Final   • MCV 06/12/2019 87.9  81.4 - 97.8 fL Final   • MCH  06/12/2019 29.4  27.0 - 33.0 pg Final   • MCHC 06/12/2019 33.5* 33.7 - 35.3 g/dL Final   • RDW 06/12/2019 41.0  35.9 - 50.0 fL Final   • Platelet Count 06/12/2019 205  164 - 446 K/uL Final   • MPV 06/12/2019 11.3  9.0 - 12.9 fL Final   • Neutrophils-Polys 06/12/2019 70.40  44.00 - 72.00 % Final   • Lymphocytes 06/12/2019 13.70* 22.00 - 41.00 % Final   • Monocytes 06/12/2019 11.70  0.00 - 13.40 % Final   • Eosinophils 06/12/2019 3.20  0.00 - 6.90 % Final   • Basophils 06/12/2019 0.60  0.00 - 1.80 % Final   • Immature Granulocytes 06/12/2019 0.40  0.00 - 0.90 % Final   • Nucleated RBC 06/12/2019 0.00  /100 WBC Final   • Neutrophils (Absolute) 06/12/2019 7.09  1.82 - 7.42 K/uL Final    Includes immature neutrophils, if present.   • Lymphs (Absolute) 06/12/2019 1.38  1.00 - 4.80 K/uL Final   • Monos (Absolute) 06/12/2019 1.18* 0.00 - 0.85 K/uL Final   • Eos (Absolute) 06/12/2019 0.32  0.00 - 0.51 K/uL Final   • Baso (Absolute) 06/12/2019 0.06  0.00 - 0.12 K/uL Final   • Immature Granulocytes (abs) 06/12/2019 0.04  0.00 - 0.11 K/uL Final   • NRBC (Absolute) 06/12/2019 0.00  K/uL Final   • Sodium 06/12/2019 139  135 - 145 mmol/L Final   • Potassium 06/12/2019 4.0  3.6 - 5.5 mmol/L Final   • Chloride 06/12/2019 107  96 - 112 mmol/L Final   • Co2 06/12/2019 24  20 - 33 mmol/L Final   • Anion Gap 06/12/2019 8.0  0.0 - 11.9 Final   • Glucose 06/12/2019 108* 65 - 99 mg/dL Final   • Bun 06/12/2019 12  8 - 22 mg/dL Final   • Creatinine 06/12/2019 1.16  0.50 - 1.40 mg/dL Final   • Calcium 06/12/2019 9.1  8.5 - 10.5 mg/dL Final   • AST(SGOT) 06/12/2019 35  12 - 45 U/L Final   • ALT(SGPT) 06/12/2019 70* 2 - 50 U/L Final   • Alkaline Phosphatase 06/12/2019 70  30 - 99 U/L Final   • Total Bilirubin 06/12/2019 0.9  0.1 - 1.5 mg/dL Final   • Albumin 06/12/2019 4.5  3.2 - 4.9 g/dL Final   • Total Protein 06/12/2019 7.7  6.0 - 8.2 g/dL Final   • Globulin 06/12/2019 3.2  1.9 - 3.5 g/dL Final   • A-G Ratio 06/12/2019 1.4  g/dL Final   •  Cholesterol,Tot 06/12/2019 166  100 - 199 mg/dL Final   • Triglycerides 06/12/2019 214* 0 - 149 mg/dL Final   • HDL 06/12/2019 34* >=40 mg/dL Final   • LDL 06/12/2019 89  <100 mg/dL Final   • Uric Acid 06/12/2019 8.1  2.5 - 8.3 mg/dL Final   • Fasting Status 06/12/2019 Fasting   Final   • GFR If  06/12/2019 >60  >60 mL/min/1.73 m 2 Final   • GFR If Non  06/12/2019 >60  >60 mL/min/1.73 m 2 Final          PLAN:    1. Sore throat  5. Acute non-recurrent maxillary sinusitis  - Patient presents with sore throat and URI-like symptoms. States his children at home have bronchitis and that one of them is on amoxicillin.   - Prescribing augmentin. Reviewed the risks and benefits as well as potential side effects of augmentin with patient. Given severity of his symptoms, informed patient he can start taking the augmentin right away. Supportive care instructions and return precautions given.   - amoxicillin-clavulanate (AUGMENTIN) 875-125 MG Tab; Take 1 Tab by mouth 2 times a day for 7 days.  Dispense: 14 Tab; Refill: 0    2. Essential hypertension  - Stable at this time. Continue current plan of care and medications. Refilling lisinopril  - lisinopril (PRINIVIL, ZESTRIL) 40 MG tablet; Take 1 Tab by mouth every day.  Dispense: 90 Tab; Refill: 3    3. Gastroesophageal reflux disease without esophagitis  - Stable at this time. Continue current plan of care and medications.     4. Elevated hemoglobin (HCC)  - Recent labs showed elevated RBC and H&H, possibly related to testosterone or illness. Continuing to monitor. Ordering updated labs  - CBC WITH DIFFERENTIAL; Future    6. Elevated glucose  - Recent labs showed elevated glucose at 108. Continuing to monitor. Ordering A1C  - Counseled patient on diet and exercise. Advised decreasing intake of carbohydrates and sugary foods and increasing intake of vegetables.   - HEMOGLOBIN A1C; Future     Follow-up for annual exam in 1 month. patient is  encouraged to be seen in the emergency room for chest pain, palpitations, shortness of breath, dizziness, severe abdominal pain or other concerning symptoms.     Please note that this dictation was created using voice recognition software. I have made every reasonable attempt to correct obvious errors, but I expect that there are errors of grammar and possibly content that I did not discover before finalizing the note.      Assessment/Plan  1. Sore throat     2. Essential hypertension  lisinopril (PRINIVIL, ZESTRIL) 40 MG tablet   3. Gastroesophageal reflux disease without esophagitis     4. Elevated hemoglobin (HCC)  CBC WITH DIFFERENTIAL   5. Acute non-recurrent maxillary sinusitis  amoxicillin-clavulanate (AUGMENTIN) 875-125 MG Tab   6. Elevated glucose  HEMOGLOBIN A1C         I have placed the below orders and discussed them with an approved delegating provider. The MA is performing the below orders under the direction of Dr. Terry.       Juvenal ROMERO (Scribe), am scribing for, and in the presence of, ARLENE Merino    Electronically signed by: Juvenal Deutsch (Emelinaibe), 6/13/2019    Oswald ROMERO APRN personally performed the services described in this documentation, as scribed by Juvenal Deutsch in my presence, and it is both accurate and complete.

## 2019-06-13 NOTE — TELEPHONE ENCOUNTER
DOCUMENTATION OF PAR STATUS:    1. Name of Medication & Dose: Testosterone 200mg     2. Name of Prescription Coverage Company & phone #: [employer] Ph: [phone]    3. Date Prior Auth Submitted: 6/13/19    4. What information was given to obtain insurance decision? Chart notes and labs    5. Prior Auth Status? Pending    6. Action Taken: Pharmacy/Patient Notified: yes

## 2019-06-16 ENCOUNTER — PATIENT MESSAGE (OUTPATIENT)
Dept: MEDICAL GROUP | Facility: PHYSICIAN GROUP | Age: 49
End: 2019-06-16

## 2019-06-16 LAB
SHBG SERPL-SCNC: 12 NMOL/L (ref 11–80)
TESTOST FREE SERPL-MCNC: 153.3 PG/ML (ref 47–244)
TESTOST SERPL-MCNC: 575 NG/DL (ref 300–890)
TESTOSTERONE.FREE+WB SERPL-MCNC: 454.9 NG/DL (ref 130–680)

## 2019-06-17 ENCOUNTER — PATIENT MESSAGE (OUTPATIENT)
Dept: MEDICAL GROUP | Facility: PHYSICIAN GROUP | Age: 49
End: 2019-06-17

## 2019-06-20 NOTE — TELEPHONE ENCOUNTER
FINAL PRIOR AUTHORIZATION STATUS:    1.  Name of Medication & Dose: Testosterone CYP 200mg     2. Prior Auth Status (if approved--length of approval):   Approved 5/21/19-6/19/20    I spoke with Janki who informed me of the approval. Reference # [id number]    3. Action Taken:Patient Notified: yes    Documentation was scanned into media and attached to this telephone encounter.

## 2019-06-25 ENCOUNTER — PATIENT MESSAGE (OUTPATIENT)
Dept: MEDICAL GROUP | Facility: PHYSICIAN GROUP | Age: 49
End: 2019-06-25

## 2019-07-01 ENCOUNTER — PATIENT MESSAGE (OUTPATIENT)
Dept: MEDICAL GROUP | Facility: PHYSICIAN GROUP | Age: 49
End: 2019-07-01

## 2019-07-01 ENCOUNTER — HOSPITAL ENCOUNTER (OUTPATIENT)
Dept: RADIOLOGY | Facility: MEDICAL CENTER | Age: 49
End: 2019-07-01
Attending: NURSE PRACTITIONER
Payer: COMMERCIAL

## 2019-07-01 DIAGNOSIS — R05.9 COUGH: ICD-10-CM

## 2019-07-01 PROCEDURE — 71046 X-RAY EXAM CHEST 2 VIEWS: CPT

## 2019-07-01 RX ORDER — DOXYCYCLINE HYCLATE 100 MG
100 TABLET ORAL 2 TIMES DAILY
Qty: 14 TAB | Refills: 0 | Status: SHIPPED | OUTPATIENT
Start: 2019-07-01 | End: 2019-07-08

## 2019-07-12 ENCOUNTER — HOSPITAL ENCOUNTER (OUTPATIENT)
Dept: LAB | Facility: MEDICAL CENTER | Age: 49
End: 2019-07-12
Attending: NURSE PRACTITIONER
Payer: COMMERCIAL

## 2019-07-12 DIAGNOSIS — R73.09 ELEVATED GLUCOSE: ICD-10-CM

## 2019-07-12 DIAGNOSIS — D58.2 ELEVATED HEMOGLOBIN (HCC): ICD-10-CM

## 2019-07-12 LAB
BASOPHILS # BLD AUTO: 0.9 % (ref 0–1.8)
BASOPHILS # BLD: 0.08 K/UL (ref 0–0.12)
EOSINOPHIL # BLD AUTO: 0.7 K/UL (ref 0–0.51)
EOSINOPHIL NFR BLD: 7.5 % (ref 0–6.9)
ERYTHROCYTE [DISTWIDTH] IN BLOOD BY AUTOMATED COUNT: 43.9 FL (ref 35.9–50)
EST. AVERAGE GLUCOSE BLD GHB EST-MCNC: 111 MG/DL
HBA1C MFR BLD: 5.5 % (ref 0–5.6)
HCT VFR BLD AUTO: 55.1 % (ref 42–52)
HGB BLD-MCNC: 18.4 G/DL (ref 14–18)
IMM GRANULOCYTES # BLD AUTO: 0.03 K/UL (ref 0–0.11)
IMM GRANULOCYTES NFR BLD AUTO: 0.3 % (ref 0–0.9)
LYMPHOCYTES # BLD AUTO: 2.55 K/UL (ref 1–4.8)
LYMPHOCYTES NFR BLD: 27.4 % (ref 22–41)
MCH RBC QN AUTO: 30.3 PG (ref 27–33)
MCHC RBC AUTO-ENTMCNC: 33.4 G/DL (ref 33.7–35.3)
MCV RBC AUTO: 90.6 FL (ref 81.4–97.8)
MONOCYTES # BLD AUTO: 0.92 K/UL (ref 0–0.85)
MONOCYTES NFR BLD AUTO: 9.9 % (ref 0–13.4)
NEUTROPHILS # BLD AUTO: 5.04 K/UL (ref 1.82–7.42)
NEUTROPHILS NFR BLD: 54 % (ref 44–72)
NRBC # BLD AUTO: 0 K/UL
NRBC BLD-RTO: 0 /100 WBC
PLATELET # BLD AUTO: 202 K/UL (ref 164–446)
PMV BLD AUTO: 11.3 FL (ref 9–12.9)
RBC # BLD AUTO: 6.08 M/UL (ref 4.7–6.1)
WBC # BLD AUTO: 9.3 K/UL (ref 4.8–10.8)

## 2019-07-12 PROCEDURE — 85025 COMPLETE CBC W/AUTO DIFF WBC: CPT

## 2019-07-12 PROCEDURE — 83036 HEMOGLOBIN GLYCOSYLATED A1C: CPT

## 2019-07-12 PROCEDURE — 36415 COLL VENOUS BLD VENIPUNCTURE: CPT

## 2019-07-15 ENCOUNTER — PATIENT MESSAGE (OUTPATIENT)
Dept: MEDICAL GROUP | Facility: PHYSICIAN GROUP | Age: 49
End: 2019-07-15

## 2019-07-15 ENCOUNTER — OFFICE VISIT (OUTPATIENT)
Dept: MEDICAL GROUP | Facility: PHYSICIAN GROUP | Age: 49
End: 2019-07-15
Payer: COMMERCIAL

## 2019-07-15 VITALS
HEIGHT: 70 IN | WEIGHT: 205 LBS | RESPIRATION RATE: 16 BRPM | BODY MASS INDEX: 29.35 KG/M2 | DIASTOLIC BLOOD PRESSURE: 84 MMHG | HEART RATE: 79 BPM | TEMPERATURE: 98.5 F | OXYGEN SATURATION: 95 % | SYSTOLIC BLOOD PRESSURE: 122 MMHG

## 2019-07-15 DIAGNOSIS — N52.1 ERECTILE DYSFUNCTION DUE TO DISEASES CLASSIFIED ELSEWHERE: ICD-10-CM

## 2019-07-15 DIAGNOSIS — K21.9 GASTROESOPHAGEAL REFLUX DISEASE WITHOUT ESOPHAGITIS: ICD-10-CM

## 2019-07-15 DIAGNOSIS — R79.89 LOW TESTOSTERONE IN MALE: ICD-10-CM

## 2019-07-15 DIAGNOSIS — M1A.9XX0 CHRONIC GOUT INVOLVING TOE OF LEFT FOOT WITHOUT TOPHUS, UNSPECIFIED CAUSE: ICD-10-CM

## 2019-07-15 PROCEDURE — 99396 PREV VISIT EST AGE 40-64: CPT | Performed by: NURSE PRACTITIONER

## 2019-07-15 RX ORDER — RANITIDINE 150 MG/1
150 TABLET ORAL DAILY
Refills: 0 | COMMUNITY
Start: 2019-06-30 | End: 2020-10-20

## 2019-07-15 RX ORDER — ALLOPURINOL 100 MG/1
100 TABLET ORAL DAILY
Qty: 90 TAB | Refills: 0 | Status: SHIPPED | OUTPATIENT
Start: 2019-07-15 | End: 2019-09-10

## 2019-07-15 RX ORDER — OMEPRAZOLE 20 MG/1
20 CAPSULE, DELAYED RELEASE ORAL DAILY
Qty: 90 CAP | Refills: 3 | Status: SHIPPED | OUTPATIENT
Start: 2019-07-15 | End: 2020-06-29

## 2019-07-15 RX ORDER — VARDENAFIL HYDROCHLORIDE 20 MG/1
20 TABLET ORAL PRN
Qty: 10 TAB | Refills: 3 | Status: SHIPPED | OUTPATIENT
Start: 2019-07-15 | End: 2019-07-17

## 2019-07-15 NOTE — PROGRESS NOTES
Subjective:     CC:   Chief Complaint   Patient presents with   • Annual Exam       HPI:   Neil Abdi Jr. is a 48 y.o. male who presents for an annual exam and to go over recent lab results.    Chronic gout involving toe of left foot without tophus, unspecified cause  Chronic. Patient states he has history of gout. He reports having left foot cramping which he thinks is from gout. States the pain is significantly exacerbating when pressure is placed on the ball of his foot such as when walking. States it will feel like he broke a bone. His uric acid level in 6/12/19 was noted to be 8.1.    Gastroesophageal reflux disease without esophagitis  Chronic. Patient states he was able to wean off of omeprazole at one point and did not use it for about a week. However, he woke up in the middle of the night with severe heartburn. He has had a couple of these episodes. He was taking ranitidine at the time which did not help. He recently started taking omeprazole in the morning which seems to help. He has been out of the omeprazole lately which has caused the heartburn to flare up again. He is requesting for refill of omeprazole.     Low testosterone in male  Erectile dysfunction due to diseases classified elsewhere  Chronic. Patient states he has been having intermittent mild issues with erectile dysfunction which he noticed over the last year. States he is able to get erections but has difficulty maintaining erections during intercourse. Patient states he has tried Cialis in the past which gave him migraines. He has also tried Levitra which seemed to work. Denies any dysparunia. He plans to mention this issue at his upcoming endocrinologist appointment.     Discussed recent lab results which were notable for hemoglobin of 18.4 and hematocrit of 55.1. Patient states he has an appointment to see endocrinology next month.     Last colonoscopy: Will be due on his next birthday  Last Tdap: 12/16/2016   Hx STDs: No  "  Recent STD test: No. Long term monogamous relationship for 20+ years.  Regular exercise: Yes  Diet: Yes    He  has a past medical history of Anxiety; Asthma; GERD (gastroesophageal reflux disease); Hypertension; and Migraine.  He  has a past surgical history that includes laminotomy.  Family History   Problem Relation Age of Onset   • Cancer Mother         colon   • Cancer Father         Prostate   • No Known Problems Sister    • No Known Problems Sister    • No Known Problems Sister    • Cancer Paternal Uncle         Unknown   • Cancer Maternal Grandfather         Lung   • Cancer Paternal Grandfather         Throat     Social History   Substance Use Topics   • Smoking status: Former Smoker     Quit date: 7/31/2002   • Smokeless tobacco: Former User   • Alcohol use 1.8 oz/week     1 Glasses of wine, 1 Cans of beer, 1 Shots of liquor per week      Comment: weekly       Patient Active Problem List    Diagnosis Date Noted   • Lump in neck 03/12/2018   • Acne vulgaris 03/07/2017   • Chronic gout involving toe of left foot without tophus 03/07/2017   • Mixed hyperlipidemia 03/07/2017   • Essential hypertension 02/16/2017   • Gastroesophageal reflux disease without esophagitis 02/16/2017   • Multiple allergies 02/16/2017   • Low testosterone in male 02/16/2017   • Exercise-induced asthma 02/16/2017       Current Outpatient Prescriptions   Medication Sig Dispense Refill   • raNITidine (ZANTAC) 150 MG Tab Take 150 mg by mouth every day.  0   • allopurinol (ZYLOPRIM) 100 MG Tab Take 1 Tab by mouth every day for 60 days. Start 200 mg daily for 2nd month. 90 Tab 0   • omeprazole (PRILOSEC) 20 MG delayed-release capsule Take 1 Cap by mouth every day. 90 Cap 3   • vardenafil (LEVITRA) 20 MG tablet Take 1 Tab by mouth as needed. 10 Tab 3   • lisinopril (PRINIVIL, ZESTRIL) 40 MG tablet Take 1 Tab by mouth every day. 90 Tab 3   • BD DISP NEEDLES 22G X 1-1/2\" Misc 1 Each by Intramuscular route every 7 days. 50 Each 0   • syringe " "(B-D SYRINGE LUER-MYCHAL 1CC) 1 ML Misc 1 mL by Does not apply route every 7 days. 50 Each 0   • testosterone cypionate (DEPO-TESTOSTERONE) 200 MG/ML Solution injection 0.5 mL by Intramuscular route every 7 days for 140 days. From [facility] only. Medically necessary. 10 mL 0   • azelastine (ASTELIN) 137 MCG/SPRAY nasal spray [place] 2 Sprays in nose 2 Times a Day. Each Nostril  3   • montelukast (SINGULAIR) 10 MG Tab TAKE 1 TABLET BY MOUTH EVERY DAY 90 Tab 0   • Fexofenadine HCl (ALLEGRA ALLERGY PO) Take  by mouth.     • albuterol 108 (90 Base) MCG/ACT Aero Soln inhalation aerosol Inhale 2 Puffs by mouth every 6 hours as needed for Shortness of Breath. 8.5 g 6     No current facility-administered medications for this visit.     (including changes today)  Allergies: Patient has no known allergies.    Review of Systems   Constitutional: Negative for fever, chills and malaise/fatigue.   HENT: Negative for congestion.    Eyes: Negative for pain.   Respiratory: Negative for cough and shortness of breath.    Cardiovascular: Negative for leg swelling.   Gastrointestinal: Heartburn. Negative for nausea, vomiting, and diarrhea.   Genitourinary: Erectile dysfunction. Negative for dyspareunia, dysuria, and hematuria.   Musculoskeletal: Left foot cramping pain.  Skin: Negative for rash.   Neurological: Negative for dizziness, focal weakness and headaches.   Endo/Heme/Allergies: Does not bruise/bleed easily.   Psychiatric/Behavioral: Negative for depression.  The patient is not nervous/anxious.   As above, all other systems negative.      Objective:     /84 (BP Location: Left arm, Patient Position: Sitting, BP Cuff Size: Adult)   Pulse 79   Temp 36.9 °C (98.5 °F) (Temporal)   Resp 16   Ht 1.778 m (5' 10\")   Wt 93 kg (205 lb)   SpO2 95%   BMI 29.41 kg/m²   Body mass index is 29.41 kg/m².  Wt Readings from Last 4 Encounters:   07/15/19 93 kg (205 lb)   06/13/19 93.9 kg (207 lb)   06/10/19 94.3 kg (208 lb) "   06/05/19 94.3 kg (208 lb)       Physical Exam:  Constitutional: Well-developed and well-nourished. Not diaphoretic. No distress.   Skin: Skin is warm and dry. No rash noted.  Head: Atraumatic without lesions.  Eyes: Conjunctivae and extraocular motions are normal. Pupils are equal, round, and reactive to light. No scleral icterus.   Ears:  External ears unremarkable. Tympanic membranes clear and intact.  Nose: Nares patent. Septum midline. Turbinates without erythema nor edema. No discharge.   Mouth/Throat: Dentition is normal. Tongue normal. Oropharynx is clear and moist. Posterior pharynx without erythema or exudates.  Neck: Supple, trachea midline. Normal range of motion. No thyromegaly present. No lymphadenopathy--cervical or supraclavicular.  Cardiovascular: Regular rate and rhythm, S1 and S2 without murmur, rubs, or gallops.    Chest: Effort normal. Clear to auscultation throughout. No adventitious sounds. No CVA tenderness.  Abdomen: Soft, non tender, and without distention. Active bowel sounds in all four quadrants. No rebound, guarding, masses or HSM.  Extremities: No cyanosis, clubbing, erythema, nor edema. Distal pulses intact and symmetric.   Musculoskeletal: All major joints AROM full in all directions without pain.  Neurological: Alert and oriented x 3. DTRs 2+/3 and symmetric. No cranial nerve deficit. Sensation intact. Negative Rhomberg.  Psychiatric:  Behavior, mood, and affect are appropriate.    Labs:  Hospital Outpatient Visit on 07/12/2019   Component Date Value Ref Range Status   • WBC 07/12/2019 9.3  4.8 - 10.8 K/uL Final   • RBC 07/12/2019 6.08  4.70 - 6.10 M/uL Final   • Hemoglobin 07/12/2019 18.4* 14.0 - 18.0 g/dL Final   • Hematocrit 07/12/2019 55.1* 42.0 - 52.0 % Final   • MCV 07/12/2019 90.6  81.4 - 97.8 fL Final   • MCH 07/12/2019 30.3  27.0 - 33.0 pg Final   • MCHC 07/12/2019 33.4* 33.7 - 35.3 g/dL Final   • RDW 07/12/2019 43.9  35.9 - 50.0 fL Final   • Platelet Count 07/12/2019 202   164 - 446 K/uL Final   • MPV 07/12/2019 11.3  9.0 - 12.9 fL Final   • Neutrophils-Polys 07/12/2019 54.00  44.00 - 72.00 % Final   • Lymphocytes 07/12/2019 27.40  22.00 - 41.00 % Final   • Monocytes 07/12/2019 9.90  0.00 - 13.40 % Final   • Eosinophils 07/12/2019 7.50* 0.00 - 6.90 % Final   • Basophils 07/12/2019 0.90  0.00 - 1.80 % Final   • Immature Granulocytes 07/12/2019 0.30  0.00 - 0.90 % Final   • Nucleated RBC 07/12/2019 0.00  /100 WBC Final   • Neutrophils (Absolute) 07/12/2019 5.04  1.82 - 7.42 K/uL Final    Includes immature neutrophils, if present.   • Lymphs (Absolute) 07/12/2019 2.55  1.00 - 4.80 K/uL Final   • Monos (Absolute) 07/12/2019 0.92* 0.00 - 0.85 K/uL Final   • Eos (Absolute) 07/12/2019 0.70* 0.00 - 0.51 K/uL Final   • Baso (Absolute) 07/12/2019 0.08  0.00 - 0.12 K/uL Final   • Immature Granulocytes (abs) 07/12/2019 0.03  0.00 - 0.11 K/uL Final   • NRBC (Absolute) 07/12/2019 0.00  K/uL Final   • Glycohemoglobin 07/12/2019 5.5  0.0 - 5.6 % Final    Comment: Increased risk for diabetes:  5.7 -6.4%  Diabetes:  >6.4%  Glycemic control for adults with diabetes:  <7.0%  The above interpretations are per ADA guidelines.  Diagnosis  of diabetes mellitus on the basis of elevated Hemoglobin A1c  should be confirmed by repeating the Hb A1c test.     • Est Avg Glucose 07/12/2019 111  mg/dL Final    Comment: The eAG calculation is based on the A1c-Derived Daily Glucose  (ADAG) study.  See the ADA's website for additional information.            Assessment and Plan:     1. 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 Most recent uric acid level was noted to be 8.1. Prescribing allopurinol. Reviewed the risks and benefits as well as potential side effects of allopurinol with patient.   - allopurinol (ZYLOPRIM) 100 MG Tab; Take 1 Tab by mouth every day for 60 days. Start 200 mg daily for 2nd  month.  Dispense: 90 Tab; Refill: 0  - URIC ACID; Future    2. Gastroesophageal reflux disease without esophagitis  - Patient states his heartburn symptoms flare up when he is not taking omeprazole. Refilling his omeprazole medication. Referring patient to GI.  - omeprazole (PRILOSEC) 20 MG delayed-release capsule; Take 1 Cap by mouth every day.  Dispense: 90 Cap; Refill: 3  - REFERRAL TO GASTROENTEROLOGY    3. Low testosterone in male  4. Erectile dysfunction due to diseases classified elsewhere  - Patient states he has tried Levitra in the past which worked for him. Prescribing Levitra, as noted below. Reviewed the risks and benefits as well as potential side effects of medication with patient.   - Will continue to monitor. Patient has upcoming appointment with endocrinology.  - vardenafil (LEVITRA) 20 MG tablet; Take 1 Tab by mouth as needed.  Dispense: 10 Tab; Refill: 3     Plan:  Labs per orders.  Vaccinations per orders.  Counseling about diet, supplements, exercise, skin care and safe sex.    Patient is encouraged to be seen in the emergency room for chest pain, palpitations, shortness of breath, dizziness, severe abdominal pain or other concerning symptoms.     Follow-up: Return in about 1 year (around 7/15/2020), or if symptoms worsen or fail to improve.     Juvenal ROMERO (Scribe), am scribing for, and in the presence of, ARLENE Merino    Electronically signed by: Juvenal Deutsch (Lavinia), 7/15/2019    Oswald ROMERO APRN personally performed the services described in this documentation, as scribed by Juvenal Deutsch in my presence, and it is both accurate and complete.

## 2019-07-17 ENCOUNTER — PATIENT MESSAGE (OUTPATIENT)
Dept: MEDICAL GROUP | Facility: PHYSICIAN GROUP | Age: 49
End: 2019-07-17

## 2019-07-17 DIAGNOSIS — N52.1 ERECTILE DYSFUNCTION DUE TO DISEASES CLASSIFIED ELSEWHERE: ICD-10-CM

## 2019-07-17 DIAGNOSIS — M1A.9XX0 CHRONIC GOUT INVOLVING TOE OF LEFT FOOT WITHOUT TOPHUS, UNSPECIFIED CAUSE: ICD-10-CM

## 2019-07-17 RX ORDER — ALLOPURINOL 300 MG/1
300 TABLET ORAL DAILY
Qty: 30 TAB | Refills: 0 | Status: SHIPPED | OUTPATIENT
Start: 2019-07-17 | End: 2019-09-09 | Stop reason: SDUPTHER

## 2019-07-17 RX ORDER — SILDENAFIL 100 MG/1
100 TABLET, FILM COATED ORAL PRN
Qty: 10 TAB | Refills: 3 | Status: SHIPPED | OUTPATIENT
Start: 2019-07-17 | End: 2021-06-18

## 2019-07-26 ENCOUNTER — HOSPITAL ENCOUNTER (OUTPATIENT)
Dept: RADIOLOGY | Facility: MEDICAL CENTER | Age: 49
End: 2019-07-26
Attending: NURSE PRACTITIONER
Payer: COMMERCIAL

## 2019-07-26 ENCOUNTER — OFFICE VISIT (OUTPATIENT)
Dept: URGENT CARE | Facility: CLINIC | Age: 49
End: 2019-07-26
Payer: COMMERCIAL

## 2019-07-26 VITALS
DIASTOLIC BLOOD PRESSURE: 100 MMHG | RESPIRATION RATE: 18 BRPM | HEIGHT: 70 IN | TEMPERATURE: 98.4 F | BODY MASS INDEX: 29.78 KG/M2 | WEIGHT: 208 LBS | HEART RATE: 70 BPM | OXYGEN SATURATION: 100 % | SYSTOLIC BLOOD PRESSURE: 132 MMHG

## 2019-07-26 DIAGNOSIS — M54.50 ACUTE LEFT-SIDED LOW BACK PAIN WITHOUT SCIATICA: ICD-10-CM

## 2019-07-26 DIAGNOSIS — N50.812 TESTICULAR PAIN, LEFT: ICD-10-CM

## 2019-07-26 LAB
APPEARANCE UR: CLEAR
BILIRUB UR STRIP-MCNC: NEGATIVE MG/DL
COLOR UR AUTO: NORMAL
GLUCOSE UR STRIP.AUTO-MCNC: NEGATIVE MG/DL
KETONES UR STRIP.AUTO-MCNC: NEGATIVE MG/DL
LEUKOCYTE ESTERASE UR QL STRIP.AUTO: NEGATIVE
NITRITE UR QL STRIP.AUTO: NEGATIVE
PH UR STRIP.AUTO: 7.5 [PH] (ref 5–8)
PROT UR QL STRIP: 30 MG/DL
RBC UR QL AUTO: NEGATIVE
SP GR UR STRIP.AUTO: 1.02
UROBILINOGEN UR STRIP-MCNC: 0.2 MG/DL

## 2019-07-26 PROCEDURE — 74176 CT ABD & PELVIS W/O CONTRAST: CPT

## 2019-07-26 PROCEDURE — 99214 OFFICE O/P EST MOD 30 MIN: CPT | Performed by: NURSE PRACTITIONER

## 2019-07-26 PROCEDURE — 81002 URINALYSIS NONAUTO W/O SCOPE: CPT | Performed by: NURSE PRACTITIONER

## 2019-07-26 RX ORDER — KETOROLAC TROMETHAMINE 30 MG/ML
30 INJECTION, SOLUTION INTRAMUSCULAR; INTRAVENOUS ONCE
Status: COMPLETED | OUTPATIENT
Start: 2019-07-26 | End: 2019-07-26

## 2019-07-26 RX ORDER — ONDANSETRON 2 MG/ML
4 INJECTION INTRAMUSCULAR; INTRAVENOUS ONCE
Status: COMPLETED | OUTPATIENT
Start: 2019-07-26 | End: 2019-07-26

## 2019-07-26 RX ORDER — KETOROLAC TROMETHAMINE 30 MG/ML
30 INJECTION, SOLUTION INTRAMUSCULAR; INTRAVENOUS ONCE
Status: DISCONTINUED | OUTPATIENT
Start: 2019-07-26 | End: 2019-07-26

## 2019-07-26 RX ADMIN — ONDANSETRON 4 MG: 2 INJECTION INTRAMUSCULAR; INTRAVENOUS at 12:30

## 2019-07-26 RX ADMIN — KETOROLAC TROMETHAMINE 30 MG: 30 INJECTION, SOLUTION INTRAMUSCULAR; INTRAVENOUS at 13:01

## 2019-07-26 ASSESSMENT — ENCOUNTER SYMPTOMS
NAUSEA: 1
DIZZINESS: 0
CHILLS: 0
FLANK PAIN: 1
BACK PAIN: 1
FEVER: 0
HEADACHES: 0
VOMITING: 0

## 2019-07-26 NOTE — PROGRESS NOTES
"Subjective:      Neil Abdi Jr. is a 48 y.o. male who presents with Back Pain (started yesterday, left side mostly and lower back pain, left testicle feeling pressure)            HPI New. 48 year old male with left flank pain that started yesterday. Steady 4/10 that increases at times. He has associated nausea and some left sided testicular pressure. Denies fever, chills, myalgia, diarrhea or urinary symptoms. No history of renal stones. Has not taken anything for this.  Patient has no known allergies.  Current Outpatient Prescriptions on File Prior to Visit   Medication Sig Dispense Refill   • raNITidine (ZANTAC) 150 MG Tab Take 150 mg by mouth every day.  0   • allopurinol (ZYLOPRIM) 100 MG Tab Take 1 Tab by mouth every day for 60 days. Start 200 mg daily for 2nd month. 90 Tab 0   • omeprazole (PRILOSEC) 20 MG delayed-release capsule Take 1 Cap by mouth every day. 90 Cap 3   • lisinopril (PRINIVIL, ZESTRIL) 40 MG tablet Take 1 Tab by mouth every day. 90 Tab 3   • BD DISP NEEDLES 22G X 1-1/2\" Misc 1 Each by Intramuscular route every 7 days. 50 Each 0   • syringe (B-D SYRINGE LUER-MYCHAL 1CC) 1 ML Misc 1 mL by Does not apply route every 7 days. 50 Each 0   • testosterone cypionate (DEPO-TESTOSTERONE) 200 MG/ML Solution injection 0.5 mL by Intramuscular route every 7 days for 140 days. From [facility] [place] only. Medically necessary. 10 mL 0   • azelastine (ASTELIN) 137 MCG/SPRAY nasal spray [place] 2 Sprays in nose 2 Times a Day. Each Nostril  3   • montelukast (SINGULAIR) 10 MG Tab TAKE 1 TABLET BY MOUTH EVERY DAY 90 Tab 0   • Fexofenadine HCl (ALLEGRA ALLERGY PO) Take  by mouth.     • sildenafil citrate (VIAGRA) 100 MG tablet Take 1 Tab by mouth as needed for Erectile Dysfunction. 10 Tab 3   • allopurinol (ZYLOPRIM) 300 MG Tab Take 1 Tab by mouth every day. 30 Tab 0   • albuterol 108 (90 Base) MCG/ACT Aero Soln inhalation aerosol Inhale 2 Puffs by mouth every 6 hours as needed for Shortness of " "Breath. 8.5 g 6     No current facility-administered medications on file prior to visit.      Social History     Social History   • Marital status:      Spouse name: N/A   • Number of children: 0   • Years of education: N/A     Occupational History   • Not on file.     Social History Main Topics   • Smoking status: Former Smoker     Quit date: 7/31/2002   • Smokeless tobacco: Former User   • Alcohol use 1.8 oz/week     1 Glasses of wine, 1 Cans of beer, 1 Shots of liquor per week      Comment: weekly   • Drug use: No   • Sexual activity: Yes     Partners: Female     Other Topics Concern   • Not on file     Social History Narrative   • No narrative on file     family history includes Cancer in his father, maternal grandfather, mother, paternal grandfather, and paternal uncle; No Known Problems in his sister, sister, and sister.        Review of Systems   Constitutional: Negative for chills, fever and malaise/fatigue.   Gastrointestinal: Positive for nausea. Negative for vomiting.   Genitourinary: Positive for flank pain. Negative for dysuria, frequency, hematuria and urgency.   Musculoskeletal: Positive for back pain.   Neurological: Negative for dizziness and headaches.          Objective:     /100 (BP Location: Left arm, Patient Position: Sitting, BP Cuff Size: Large adult)   Pulse 70   Temp 36.9 °C (98.4 °F) (Temporal)   Resp 18   Ht 1.778 m (5' 10\")   Wt 94.3 kg (208 lb)   SpO2 100%   BMI 29.84 kg/m²      Physical Exam   Constitutional: He is oriented to person, place, and time. He appears well-developed and well-nourished. No distress.   Cardiovascular: Normal rate, regular rhythm and normal heart sounds.    No murmur heard.  Pulmonary/Chest: Effort normal and breath sounds normal. No respiratory distress.   Abdominal: Soft. Bowel sounds are normal. There is tenderness in the left lower quadrant. There is CVA tenderness.   Musculoskeletal: Normal range of motion.   Moves all 4 extremities " normally   Neurological: He is alert and oriented to person, place, and time.   Skin: Skin is warm and dry.   Psychiatric: He has a normal mood and affect. His behavior is normal. Thought content normal.   Vitals reviewed.              Assessment/Plan:     1. Acute left-sided low back pain without sciatica  POCT Urinalysis    CT-RENAL COLIC EVALUATION(A/P W/O)    ketorolac (TORADOL) injection 30 mg    ondansetron (ZOFRAN) syringe/vial injection 4 mg   2. Testicular pain, left  POCT Urinalysis     Urine negative for blood  CT renal stone as he presents with pain that is similar to this issue.  toradol here in clinic as well as zofran  Will call with results.  Strict ED precautions given if pain increases prior to CT which is at 1700.    1730. Patient informed of CT results. Negative for stone. Advised on follow up with PCP for persistent symptoms and ED for any worsening of symptoms.

## 2019-08-12 ENCOUNTER — OFFICE VISIT (OUTPATIENT)
Dept: ENDOCRINOLOGY | Facility: MEDICAL CENTER | Age: 49
End: 2019-08-12
Payer: COMMERCIAL

## 2019-08-12 VITALS
HEART RATE: 77 BPM | HEIGHT: 70 IN | OXYGEN SATURATION: 100 % | SYSTOLIC BLOOD PRESSURE: 122 MMHG | WEIGHT: 204 LBS | BODY MASS INDEX: 29.2 KG/M2 | DIASTOLIC BLOOD PRESSURE: 76 MMHG

## 2019-08-12 DIAGNOSIS — E53.8 VITAMIN B 12 DEFICIENCY: ICD-10-CM

## 2019-08-12 DIAGNOSIS — E29.1 HYPOGONADISM IN MALE: ICD-10-CM

## 2019-08-12 DIAGNOSIS — E55.9 VITAMIN D DEFICIENCY: ICD-10-CM

## 2019-08-12 PROCEDURE — 99214 OFFICE O/P EST MOD 30 MIN: CPT | Performed by: INTERNAL MEDICINE

## 2019-08-12 NOTE — PROGRESS NOTES
Endocrinology Clinic Progress Note  PCP: ARA Hunt    HPI:  Neil Abdi Jr. is a 48 y.o. old patient who comes in today for review of hypogonadism in male.   He is currently on Testosterone Cypionate 100 mg  IM every 7 days.      Ref. Range 6/12/2019 08:20   Sex Hormone Bind Globulin Latest Ref Range: 11 - 80 nmol/L 13   Testosterone,Total Latest Ref Range: 175 - 781 ng/dL 485     His Hgb and Hct is elevated at:      Ref. Range 7/12/2019 06:23   Hemoglobin Latest Ref Range: 14.0 - 18.0 g/dL 18.4 (H)   Hematocrit Latest Ref Range: 42.0 - 52.0 % 55.1 (H)     Feels his energy level is pretty good at this time.     ROS:  Constitutional: No weight loss  Cardiac: No palpitations or racing heart  Resp: No shortness of breath  Neuro: No numbness or tinging in feet  Endo: No heat or cold intolerance, no polyuria or polydipsia  All other systems were reviewed and were negative.    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    Social History:  Social History     Socioeconomic History   • Marital status:      Spouse name: Not on file   • Number of children: 0   • Years of education: Not on file   • Highest education level: Not on file   Occupational History   • Not on file   Social Needs   • Financial resource strain: Not on file   • Food insecurity:     Worry: Not on file     Inability: Not on file   • Transportation needs:      Medical: Not on file     Non-medical: Not on file   Tobacco Use   • Smoking status: Former Smoker     Last attempt to quit: 2002     Years since quittin.0   • Smokeless tobacco: Former User   Substance and Sexual Activity   • Alcohol use: Yes     Alcohol/week: 1.8 oz     Types: 1 Glasses of wine, 1 Cans of beer, 1 Shots of liquor per week     Comment: weekly   • Drug use: No   • Sexual activity: Yes     Partners: Female   Lifestyle   • Physical activity:     Days per week: Not on file     Minutes per session: Not on file   • Stress: Not on file   Relationships   • Social connections:     Talks on phone: Not on file     Gets together: Not on file     Attends [employer] service: Not on file     Active member of club or organization: Not on file     Attends meetings of clubs or organizations: Not on file     Relationship status: Not on file   • Intimate partner violence:     Fear of current or ex partner: Not on file     Emotionally abused: Not on file     Physically abused: Not on file     Forced sexual activity: Not on file   Other Topics Concern   • Not on file   Social History Narrative   • Not on file       Family History:  Family History   Problem Relation Age of Onset   • Cancer Mother         colon   • Cancer Father         Prostate   • No Known Problems Sister    • No Known Problems Sister    • No Known Problems Sister    • Cancer Paternal Uncle         Unknown   • Cancer Maternal Grandfather         Lung   • Cancer Paternal Grandfather         Throat       Medications:    Current Outpatient Medications:   •  raNITidine (ZANTAC) 150 MG Tab, Take 150 mg by mouth every day., Disp: , Rfl: 0  •  allopurinol (ZYLOPRIM) 100 MG Tab, Take 1 Tab by mouth every day for 60 days. Start 200 mg daily for 2nd month., Disp: 90 Tab, Rfl: 0  •  omeprazole (PRILOSEC) 20 MG delayed-release capsule, Take 1 Cap by mouth every day., Disp: 90 Cap, Rfl: 3  •  lisinopril (PRINIVIL, ZESTRIL) 40 MG tablet, Take 1 Tab by mouth  "every day., Disp: 90 Tab, Rfl: 3  •  testosterone cypionate (DEPO-TESTOSTERONE) 200 MG/ML Solution injection, 0.5 mL by Intramuscular route every 7 days for 140 days. From [employer] [employer] only. Medically necessary., Disp: 10 mL, Rfl: 0  •  azelastine (ASTELIN) 137 MCG/SPRAY nasal spray, Spray 2 Sprays in nose 2 Times a Day. Each Nostril, Disp: , Rfl: 3  •  montelukast (SINGULAIR) 10 MG Tab, TAKE 1 TABLET BY MOUTH EVERY DAY, Disp: 90 Tab, Rfl: 0  •  Fexofenadine HCl (ALLEGRA ALLERGY PO), Take  by mouth., Disp: , Rfl:   •  albuterol 108 (90 Base) MCG/ACT Aero Soln inhalation aerosol, Inhale 2 Puffs by mouth every 6 hours as needed for Shortness of Breath., Disp: 8.5 g, Rfl: 6  •  sildenafil citrate (VIAGRA) 100 MG tablet, Take 1 Tab by mouth as needed for Erectile Dysfunction., Disp: 10 Tab, Rfl: 3  •  allopurinol (ZYLOPRIM) 300 MG Tab, Take 1 Tab by mouth every day. (Patient not taking: Reported on 8/12/2019), Disp: 30 Tab, Rfl: 0  •  BD DISP NEEDLES 22G X 1-1/2\" Misc, 1 Each by Intramuscular route every 7 days., Disp: 50 Each, Rfl: 0  •  syringe (B-D SYRINGE LUER-MYCHAL 1CC) 1 ML Misc, 1 mL by Does not apply route every 7 days., Disp: 50 Each, Rfl: 0    Labs: Reviewed    Physical Examination:  Vital signs: /76 (BP Location: Right arm, Patient Position: Sitting)   Pulse 77   Ht 1.778 m (5' 10\")   Wt 92.5 kg (204 lb)   SpO2 100%   BMI 29.27 kg/m²  Body mass index is 29.27 kg/m².  General: No apparent distress, cooperative  Eyes: No scleral icterus or discharge  ENMT: Normal on external inspection of nose, lips, normal thyroid exam  Neck: No abnormal masses on inspection  Resp: Normal effort, clear to auscultation bilaterally   CVS: Regular rate and rhythm, S1 S2 normal, no murmur   Extremities: No edema  Abdomen: abdominal obesity present  Neuro: Alert and oriented  Skin: No rash  Psych: Normal mood and affect, intact memory and able to make informed decisions    Assessment and Plan:  1. 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     2. Vitamin D deficiency  Cont vit D.     3. Vitamin B 12 deficiency  Cont B 12.     Return in about 3 months (around 11/12/2019).    Thank you for allowing me to participate in the care of this patient.    Margarito Youssef M.D.  08/12/19    CC:   Oswald Stinson, A.P.R.N.    This note was created using voice recognition software (Dragon). The accuracy of the dictation is limited by the abilities of the software. I have reviewed the note prior to signing, however some errors in grammar and context are still possible. If you have any questions related to this note please do not hesitate to contact our office.   This note was scribed by Nessa Walker RN, CDE

## 2019-08-27 ENCOUNTER — OUTPATIENT INFUSION SERVICES (OUTPATIENT)
Dept: ONCOLOGY | Facility: MEDICAL CENTER | Age: 49
End: 2019-08-27
Attending: INTERNAL MEDICINE
Payer: COMMERCIAL

## 2019-08-27 VITALS
OXYGEN SATURATION: 99 % | DIASTOLIC BLOOD PRESSURE: 93 MMHG | RESPIRATION RATE: 18 BRPM | HEIGHT: 70 IN | HEART RATE: 94 BPM | SYSTOLIC BLOOD PRESSURE: 132 MMHG | TEMPERATURE: 98.5 F | WEIGHT: 209.88 LBS | BODY MASS INDEX: 30.05 KG/M2

## 2019-08-27 DIAGNOSIS — D75.1 POLYCYTHEMIA: ICD-10-CM

## 2019-08-27 DIAGNOSIS — R79.89 LOW TESTOSTERONE IN MALE: ICD-10-CM

## 2019-08-27 LAB
HCT VFR BLD CALC: 53 % (ref 42–52)
HGB BLD-MCNC: 18 G/DL (ref 14–18)

## 2019-08-27 PROCEDURE — 36415 COLL VENOUS BLD VENIPUNCTURE: CPT

## 2019-08-27 PROCEDURE — 85014 HEMATOCRIT: CPT

## 2019-08-27 PROCEDURE — 99195 PHLEBOTOMY: CPT

## 2019-08-27 ASSESSMENT — PAIN SCALES - GENERAL: PAINLEVEL: NO PAIN

## 2019-08-27 NOTE — PROGRESS NOTES
Neil arrives for the therapeutic phlebotomy. Labs drawn as ordered by MD.  Neil's Hgb 18 , Hct 53 %. 500 cc blood removed. Neil tolerated well. Orthostatic vitals stable, see EPIC flowsheet. Neil denies chest pain, shortness of breath, dizziness, or lightheadedness after treatment. Next appointment scheduled. Discharged to self care; no apparent distress noted.

## 2019-09-09 DIAGNOSIS — M1A.9XX0 CHRONIC GOUT INVOLVING TOE OF LEFT FOOT WITHOUT TOPHUS, UNSPECIFIED CAUSE: ICD-10-CM

## 2019-09-10 RX ORDER — ALLOPURINOL 300 MG/1
TABLET ORAL
Qty: 90 TAB | Refills: 0 | Status: SHIPPED | OUTPATIENT
Start: 2019-09-10 | End: 2019-12-06 | Stop reason: SDUPTHER

## 2019-09-10 NOTE — TELEPHONE ENCOUNTER
Phone Number Called: [phone] (home)       Call outcome: spoke to patient regarding message below    Message: Patient is taking 300mg. LM

## 2019-10-08 DIAGNOSIS — M1A.9XX0 CHRONIC GOUT INVOLVING TOE OF LEFT FOOT WITHOUT TOPHUS, UNSPECIFIED CAUSE: ICD-10-CM

## 2019-10-09 RX ORDER — ALLOPURINOL 100 MG/1
TABLET ORAL
Qty: 90 TAB | Refills: 0 | OUTPATIENT
Start: 2019-10-09

## 2019-10-21 DIAGNOSIS — E29.1 MALE HYPOGONADISM: ICD-10-CM

## 2019-10-21 NOTE — TELEPHONE ENCOUNTER
Was the patient seen in the last year in this department? Yes    Does patient have an active prescription for medications requested? Yes    Received Request Via: Pharmacy     testosterone cypionate (DEPO-TESTOSTERONE) 200 MG/ML Solution injection    Sig: INJECT 0.5 ML INTRAMUSCULARLY EVERY 7 DAYS  DAYS

## 2019-10-22 DIAGNOSIS — E29.1 MALE HYPOGONADISM: ICD-10-CM

## 2019-10-22 RX ORDER — TESTOSTERONE CYPIONATE 200 MG/ML
100 INJECTION, SOLUTION INTRAMUSCULAR
Qty: 10 ML | Refills: 0 | Status: SHIPPED | OUTPATIENT
Start: 2019-10-22 | End: 2019-11-27 | Stop reason: SDUPTHER

## 2019-10-22 RX ORDER — TESTOSTERONE CYPIONATE 200 MG/ML
INJECTION, SOLUTION INTRAMUSCULAR
Qty: 10 ML | Refills: 0 | Status: SHIPPED | OUTPATIENT
Start: 2019-10-22 | End: 2020-05-09

## 2019-10-22 RX ORDER — TESTOSTERONE CYPIONATE 200 MG/ML
100 INJECTION, SOLUTION INTRAMUSCULAR
Qty: 10 ML | Refills: 0 | Status: SHIPPED | OUTPATIENT
Start: 2019-10-22 | End: 2020-03-10

## 2019-11-19 ENCOUNTER — OUTPATIENT INFUSION SERVICES (OUTPATIENT)
Dept: ONCOLOGY | Facility: MEDICAL CENTER | Age: 49
End: 2019-11-19
Attending: INTERNAL MEDICINE
Payer: COMMERCIAL

## 2019-11-19 VITALS
SYSTOLIC BLOOD PRESSURE: 113 MMHG | WEIGHT: 208.78 LBS | BODY MASS INDEX: 30.92 KG/M2 | OXYGEN SATURATION: 98 % | RESPIRATION RATE: 18 BRPM | DIASTOLIC BLOOD PRESSURE: 80 MMHG | TEMPERATURE: 97.1 F | HEIGHT: 69 IN | HEART RATE: 79 BPM

## 2019-11-19 DIAGNOSIS — D75.1 POLYCYTHEMIA: ICD-10-CM

## 2019-11-19 LAB
FERRITIN SERPL-MCNC: 24.9 NG/ML (ref 22–322)
HCT VFR BLD CALC: 53 % (ref 42–52)
HGB BLD-MCNC: 18 G/DL (ref 14–18)

## 2019-11-19 PROCEDURE — 36415 COLL VENOUS BLD VENIPUNCTURE: CPT

## 2019-11-19 PROCEDURE — 85014 HEMATOCRIT: CPT

## 2019-11-19 PROCEDURE — 82728 ASSAY OF FERRITIN: CPT

## 2019-11-19 PROCEDURE — 99195 PHLEBOTOMY: CPT

## 2019-11-20 NOTE — PROGRESS NOTES
Pt arrived ambulatory to IS for q 12 wk TP.  POC discussed.  PIV started to LAC, labs drawn and reviewed.  Parameters met for TP.  500 cc whole blood removed as ordered.  Post VS remain stable.  Line flushed, removed, and site covered with gauze and coban.  Next appointment confirmed.  Pt discharged from IS in NAD under self care.

## 2019-11-22 ENCOUNTER — HOSPITAL ENCOUNTER (OUTPATIENT)
Dept: LAB | Facility: MEDICAL CENTER | Age: 49
End: 2019-11-22
Attending: INTERNAL MEDICINE
Payer: COMMERCIAL

## 2019-11-22 DIAGNOSIS — E55.9 VITAMIN D DEFICIENCY: ICD-10-CM

## 2019-11-22 DIAGNOSIS — E53.8 VITAMIN B 12 DEFICIENCY: ICD-10-CM

## 2019-11-22 DIAGNOSIS — E29.1 HYPOGONADISM IN MALE: ICD-10-CM

## 2019-11-22 LAB
25(OH)D3 SERPL-MCNC: 36 NG/ML (ref 30–100)
ERYTHROCYTE [DISTWIDTH] IN BLOOD BY AUTOMATED COUNT: 41.9 FL (ref 35.9–50)
HCT VFR BLD AUTO: 50.5 % (ref 42–52)
HGB BLD-MCNC: 17 G/DL (ref 14–18)
MCH RBC QN AUTO: 30.2 PG (ref 27–33)
MCHC RBC AUTO-ENTMCNC: 33.7 G/DL (ref 33.7–35.3)
MCV RBC AUTO: 89.9 FL (ref 81.4–97.8)
PLATELET # BLD AUTO: 197 K/UL (ref 164–446)
PMV BLD AUTO: 11.7 FL (ref 9–12.9)
RBC # BLD AUTO: 5.62 M/UL (ref 4.7–6.1)
TESTOST SERPL-MCNC: 413 NG/DL (ref 175–781)
VIT B12 SERPL-MCNC: 965 PG/ML (ref 211–911)
WBC # BLD AUTO: 7 K/UL (ref 4.8–10.8)

## 2019-11-22 PROCEDURE — 82306 VITAMIN D 25 HYDROXY: CPT

## 2019-11-22 PROCEDURE — 82607 VITAMIN B-12: CPT

## 2019-11-22 PROCEDURE — 85027 COMPLETE CBC AUTOMATED: CPT

## 2019-11-22 PROCEDURE — 36415 COLL VENOUS BLD VENIPUNCTURE: CPT

## 2019-11-22 PROCEDURE — 84270 ASSAY OF SEX HORMONE GLOBUL: CPT

## 2019-11-22 PROCEDURE — 84403 ASSAY OF TOTAL TESTOSTERONE: CPT

## 2019-11-23 LAB — SHBG SERPL-SCNC: 13 NMOL/L (ref 11–80)

## 2019-11-25 NOTE — PROGRESS NOTES
Endocrinology Clinic Progress Note  PCP: ARA Hunt    HPI:  Neil Abdi Jr. is a 49 y.o. old patient who comes in today for review of his hypogonadism.   He is currently on Depo Testosterone 100 mg every 7 days.      Ref. Range 2019 07:13   Sex Hormone Bind Globulin Latest Ref Range: 11 - 80 nmol/L 13   Testosterone,Total Latest Ref Range: 175 - 781 ng/dL 413      Calculated Free Testosterone is 12.8      ROS:  Constitutional: No weight loss  Cardiac: No palpitations or racing heart  Resp: No shortness of breath  Neuro: No numbness or tinging in feet  Endo: No heat or cold intolerance, no polyuria or polydipsia  All other systems were reviewed and were negative.    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    Social History:  Social History     Socioeconomic History   • Marital status:      Spouse name: Not on file   • Number of children: 0   • Years of education: Not on file   • Highest education level: Not on file   Occupational History   • Not on file   Social Needs   • Financial resource strain: Not on file   • Food insecurity:     Worry: Not on file     Inability: Not on file   • Transportation needs:     Medical: Not on file     Non-medical: Not on file   Tobacco Use   • Smoking status: Former Smoker     Last attempt to quit: 2002     Years since quittin.3   • Smokeless tobacco: Former User    Substance and Sexual Activity   • Alcohol use: Yes     Alcohol/week: 1.8 oz     Types: 1 Glasses of wine, 1 Cans of beer, 1 Shots of liquor per week     Comment: weekly   • Drug use: No   • Sexual activity: Yes     Partners: Female   Lifestyle   • Physical activity:     Days per week: Not on file     Minutes per session: Not on file   • Stress: Not on file   Relationships   • Social connections:     Talks on phone: Not on file     Gets together: Not on file     Attends Anglican service: Not on file     Active member of club or organization: Not on file     Attends meetings of clubs or organizations: Not on file     Relationship status: Not on file   • Intimate partner violence:     Fear of current or ex partner: Not on file     Emotionally abused: Not on file     Physically abused: Not on file     Forced sexual activity: Not on file   Other Topics Concern   • Not on file   Social History Narrative   • Not on file       Family History:  Family History   Problem Relation Age of Onset   • Cancer Mother         colon   • Cancer Father         Prostate   • No Known Problems Sister    • No Known Problems Sister    • No Known Problems Sister    • Cancer Paternal Uncle         Unknown   • Cancer Maternal Grandfather         Lung   • Cancer Paternal Grandfather         Throat       Medications:    Current Outpatient Medications:   •  testosterone cypionate (DEPO-TESTOSTERONE) 200 MG/ML Solution injection, 0.5 mL by Intramuscular route every 7 days for 140 days. From [employer] only. Medically necessary., Disp: 10 mL, Rfl: 0  •  syringe (B-D SYRINGE LUER-MYCHAL 1CC) 1 ML Misc, 1 mL by Does not apply route every 7 days., Disp: 50 Each, Rfl: 0  •  testosterone cypionate (DEPO-TESTOSTERONE) 200 MG/ML Solution injection, INJECT 0.5 ML INTRAMUSCULARLY EVERY 7 DAYS  DAYS, Disp: 10 mL, Rfl: 0  •  testosterone cypionate (DEPO-TESTOSTERONE) 200 MG/ML Solution injection, 0.5 mL by Intramuscular route every 7 days  "for 140 days. From [employer] only. Medically necessary., Disp: 10 mL, Rfl: 0  •  allopurinol (ZYLOPRIM) 300 MG Tab, TAKE 1 TABLET BY MOUTH EVERY DAY, Disp: 90 Tab, Rfl: 0  •  sildenafil citrate (VIAGRA) 100 MG tablet, Take 1 Tab by mouth as needed for Erectile Dysfunction., Disp: 10 Tab, Rfl: 3  •  raNITidine (ZANTAC) 150 MG Tab, Take 150 mg by mouth every day., Disp: , Rfl: 0  •  omeprazole (PRILOSEC) 20 MG delayed-release capsule, Take 1 Cap by mouth every day., Disp: 90 Cap, Rfl: 3  •  lisinopril (PRINIVIL, ZESTRIL) 40 MG tablet, Take 1 Tab by mouth every day., Disp: 90 Tab, Rfl: 3  •  BD DISP NEEDLES 22G X 1-1/2\" Misc, 1 Each by Intramuscular route every 7 days., Disp: 50 Each, Rfl: 0  •  azelastine (ASTELIN) 137 MCG/SPRAY nasal spray, Spray 2 Sprays in nose 2 Times a Day. Each Nostril, Disp: , Rfl: 3  •  montelukast (SINGULAIR) 10 MG Tab, TAKE 1 TABLET BY MOUTH EVERY DAY, Disp: 90 Tab, Rfl: 0  •  Fexofenadine HCl (ALLEGRA ALLERGY PO), Take  by mouth., Disp: , Rfl:   •  albuterol 108 (90 Base) MCG/ACT Aero Soln inhalation aerosol, Inhale 2 Puffs by mouth every 6 hours as needed for Shortness of Breath., Disp: 8.5 g, Rfl: 6    Labs: Reviewed    Physical Examination:  Vital signs: /80 (BP Location: Left arm, Patient Position: Sitting)   Pulse (!) 108   Ht 1.778 m (5' 10\")   Wt 94.3 kg (207 lb 12.8 oz)   SpO2 98%   BMI 29.82 kg/m²  Body mass index is 29.82 kg/m².  General: No apparent distress, cooperative  Eyes: No scleral icterus or discharge  ENMT: Normal on external inspection of nose, lips, normal thyroid exam  Neck: No abnormal masses on inspection  Resp: Normal effort, clear to auscultation bilaterally   CVS: Regular rate and rhythm, S1 S2 normal, no murmur   Extremities: No edema  Abdomen: abdominal obesity present  Neuro: Alert and oriented  Skin: No rash  Psych: Normal mood and affect, intact memory and able to make informed decisions    Assessment and Plan:  1. Hypogonadism in " male  Continue current dose; his Free T levels are close to desired range of 14-25 ng/dl    2 . High risk medication use:  Monitor hb and ht closely considering testosterone use.     3. Erythrocytosis:sec to testosterone use; Therapeutic phlebotomy for erythrocytosis secondary to testosterone replacement therapy. Form faxed to [facility].     Return in about 4 months (around 3/27/2020).    Thank you for allowing me to participate in the care of this patient.    Margarito Youssef M.D.  11/25/19    CC:   Oswald Stinson, A.P.R.N.    This note was created using voice recognition software (Dragon). The accuracy of the dictation is limited by the abilities of the software. I have reviewed the note prior to signing, however some errors in grammar and context are still possible. If you have any questions related to this note please do not hesitate to contact our office.   This note was scribed by Nessa Walker RN, CDE

## 2019-11-27 ENCOUNTER — OFFICE VISIT (OUTPATIENT)
Dept: ENDOCRINOLOGY | Facility: MEDICAL CENTER | Age: 49
End: 2019-11-27
Payer: COMMERCIAL

## 2019-11-27 VITALS
OXYGEN SATURATION: 98 % | BODY MASS INDEX: 29.75 KG/M2 | SYSTOLIC BLOOD PRESSURE: 122 MMHG | WEIGHT: 207.8 LBS | HEIGHT: 70 IN | DIASTOLIC BLOOD PRESSURE: 80 MMHG | HEART RATE: 108 BPM

## 2019-11-27 DIAGNOSIS — E03.9 HYPOTHYROIDISM, UNSPECIFIED TYPE: ICD-10-CM

## 2019-11-27 DIAGNOSIS — E29.1 MALE HYPOGONADISM: ICD-10-CM

## 2019-11-27 DIAGNOSIS — E29.1 HYPOGONADISM IN MALE: ICD-10-CM

## 2019-11-27 DIAGNOSIS — Z79.899 HIGH RISK MEDICATION USE: ICD-10-CM

## 2019-11-27 DIAGNOSIS — I10 ESSENTIAL HYPERTENSION: ICD-10-CM

## 2019-11-27 DIAGNOSIS — D75.1 ERYTHROCYTOSIS: ICD-10-CM

## 2019-11-27 DIAGNOSIS — E55.9 VITAMIN D DEFICIENCY: ICD-10-CM

## 2019-11-27 DIAGNOSIS — E53.8 VITAMIN B 12 DEFICIENCY: ICD-10-CM

## 2019-11-27 PROCEDURE — 99214 OFFICE O/P EST MOD 30 MIN: CPT | Performed by: INTERNAL MEDICINE

## 2019-11-27 RX ORDER — TESTOSTERONE CYPIONATE 200 MG/ML
100 INJECTION, SOLUTION INTRAMUSCULAR
Qty: 10 ML | Refills: 0 | Status: SHIPPED | OUTPATIENT
Start: 2019-11-27 | End: 2020-02-18 | Stop reason: SDUPTHER

## 2019-12-06 DIAGNOSIS — M1A.9XX0 CHRONIC GOUT INVOLVING TOE OF LEFT FOOT WITHOUT TOPHUS, UNSPECIFIED CAUSE: ICD-10-CM

## 2019-12-06 RX ORDER — ALLOPURINOL 300 MG/1
TABLET ORAL
Qty: 90 TAB | Refills: 3 | Status: SHIPPED | OUTPATIENT
Start: 2019-12-06 | End: 2021-02-19 | Stop reason: SDUPTHER

## 2019-12-06 NOTE — TELEPHONE ENCOUNTER
Was the patient seen in the last year in this department? Yes    Does patient have an active prescription for medications requested? Yes    Received Request Via: Pharmacy     Future Appointments       Provider Department [place]    2/11/2020 3:30 PM [facility]    4/1/2020 12:20 PM Nessa Walker R.N. [facility]    4/1/2020 12:40 PM Margarito Youssef M.D. [facility]

## 2020-02-14 ENCOUNTER — OUTPATIENT INFUSION SERVICES (OUTPATIENT)
Dept: ONCOLOGY | Facility: MEDICAL CENTER | Age: 50
End: 2020-02-14
Attending: INTERNAL MEDICINE
Payer: COMMERCIAL

## 2020-02-14 VITALS
HEART RATE: 99 BPM | WEIGHT: 208.34 LBS | HEIGHT: 69 IN | BODY MASS INDEX: 30.86 KG/M2 | RESPIRATION RATE: 18 BRPM | DIASTOLIC BLOOD PRESSURE: 88 MMHG | TEMPERATURE: 97.2 F | SYSTOLIC BLOOD PRESSURE: 141 MMHG

## 2020-02-14 DIAGNOSIS — D75.1 POLYCYTHEMIA: ICD-10-CM

## 2020-02-14 LAB
FERRITIN SERPL-MCNC: 23.5 NG/ML (ref 22–322)
HCT VFR BLD CALC: 49 % (ref 42–52)
HGB BLD-MCNC: 16.7 G/DL (ref 14–18)

## 2020-02-14 PROCEDURE — 85014 HEMATOCRIT: CPT

## 2020-02-14 PROCEDURE — 36415 COLL VENOUS BLD VENIPUNCTURE: CPT

## 2020-02-14 PROCEDURE — 82728 ASSAY OF FERRITIN: CPT

## 2020-02-15 NOTE — PROGRESS NOTES
Here for every 12 week TP. Denies any complaints. PIV Placed with brisk blood return. Labs drawn and reviewed. Parameters not met for TP. PIV flushed and removed with tip intact. Site covered with pressure dressing. Discharged home to self care in no distress at this time. Next appointment in place.

## 2020-02-18 DIAGNOSIS — E29.1 MALE HYPOGONADISM: ICD-10-CM

## 2020-02-19 RX ORDER — TESTOSTERONE CYPIONATE 200 MG/ML
100 INJECTION, SOLUTION INTRAMUSCULAR
Qty: 10 ML | Refills: 0 | Status: SHIPPED | OUTPATIENT
Start: 2020-02-19 | End: 2020-05-26 | Stop reason: SDUPTHER

## 2020-02-19 NOTE — TELEPHONE ENCOUNTER
Received request via: Pharmacy    Was the patient seen in the last year in this department? Yes LOV: 19    Does the patient have an active prescription (recently filled or refills available) for medication(s) requested? No     testosterone cypionate (DEPO-TESTOSTERONE) 200 MG/ML Solution injection            Si.5 mL by Intramuscular route every 7 days for 140 days. From [employer] only. Medically necessary.

## 2020-04-01 ENCOUNTER — APPOINTMENT (OUTPATIENT)
Dept: ENDOCRINOLOGY | Facility: MEDICAL CENTER | Age: 50
End: 2020-04-01
Payer: COMMERCIAL

## 2020-05-26 DIAGNOSIS — E29.1 HYPOGONADISM IN MALE: ICD-10-CM

## 2020-05-26 DIAGNOSIS — E29.1 MALE HYPOGONADISM: ICD-10-CM

## 2020-05-27 ENCOUNTER — TELEPHONE (OUTPATIENT)
Dept: MEDICAL GROUP | Facility: PHYSICIAN GROUP | Age: 50
End: 2020-05-27

## 2020-05-27 DIAGNOSIS — E29.1 MALE HYPOGONADISM: ICD-10-CM

## 2020-05-27 RX ORDER — NEEDLES, DISPOSABLE 25GX5/8"
1 NEEDLE, DISPOSABLE MISCELLANEOUS
Qty: 50 EACH | Refills: 0 | Status: SHIPPED | OUTPATIENT
Start: 2020-05-27 | End: 2020-08-16 | Stop reason: SDUPTHER

## 2020-05-27 RX ORDER — TESTOSTERONE CYPIONATE 200 MG/ML
100 INJECTION, SOLUTION INTRAMUSCULAR
Qty: 10 ML | Refills: 0 | Status: CANCELLED | OUTPATIENT
Start: 2020-05-27 | End: 2020-10-14

## 2020-05-27 RX ORDER — TESTOSTERONE CYPIONATE 200 MG/ML
100 INJECTION, SOLUTION INTRAMUSCULAR
Qty: 10 ML | Refills: 0 | Status: SHIPPED | OUTPATIENT
Start: 2020-05-27 | End: 2020-06-01 | Stop reason: SDUPTHER

## 2020-05-27 RX ORDER — SYRINGE, DISPOSABLE, 1 ML
1 SYRINGE, EMPTY DISPOSABLE MISCELLANEOUS
Qty: 50 EACH | Refills: 0 | Status: SHIPPED | OUTPATIENT
Start: 2020-05-27 | End: 2021-06-14 | Stop reason: SDUPTHER

## 2020-06-01 DIAGNOSIS — E29.1 MALE HYPOGONADISM: ICD-10-CM

## 2020-06-01 RX ORDER — TESTOSTERONE CYPIONATE 200 MG/ML
100 INJECTION, SOLUTION INTRAMUSCULAR
Qty: 10 ML | Refills: 0 | Status: SHIPPED | OUTPATIENT
Start: 2020-06-01 | End: 2020-06-02 | Stop reason: SDUPTHER

## 2020-06-02 DIAGNOSIS — E29.1 MALE HYPOGONADISM: ICD-10-CM

## 2020-06-02 RX ORDER — TESTOSTERONE CYPIONATE 200 MG/ML
100 INJECTION, SOLUTION INTRAMUSCULAR
Qty: 10 ML | Refills: 0 | Status: SHIPPED | OUTPATIENT
Start: 2020-06-02 | End: 2020-06-17 | Stop reason: SDUPTHER

## 2020-06-04 DIAGNOSIS — I10 ESSENTIAL HYPERTENSION: ICD-10-CM

## 2020-06-04 RX ORDER — LISINOPRIL 40 MG/1
TABLET ORAL
Qty: 90 TAB | Refills: 0 | Status: SHIPPED | OUTPATIENT
Start: 2020-06-04 | End: 2020-09-03

## 2020-06-05 ENCOUNTER — HOSPITAL ENCOUNTER (OUTPATIENT)
Dept: LAB | Facility: MEDICAL CENTER | Age: 50
End: 2020-06-05
Attending: INTERNAL MEDICINE
Payer: COMMERCIAL

## 2020-06-05 DIAGNOSIS — E53.8 VITAMIN B 12 DEFICIENCY: ICD-10-CM

## 2020-06-05 DIAGNOSIS — E55.9 VITAMIN D DEFICIENCY: ICD-10-CM

## 2020-06-05 DIAGNOSIS — E03.9 HYPOTHYROIDISM, UNSPECIFIED TYPE: ICD-10-CM

## 2020-06-05 DIAGNOSIS — E29.1 MALE HYPOGONADISM: ICD-10-CM

## 2020-06-08 ENCOUNTER — APPOINTMENT (OUTPATIENT)
Dept: ONCOLOGY | Facility: MEDICAL CENTER | Age: 50
End: 2020-06-08
Attending: INTERNAL MEDICINE
Payer: COMMERCIAL

## 2020-06-08 DIAGNOSIS — E29.1 HYPOGONADISM IN MALE: ICD-10-CM

## 2020-06-08 DIAGNOSIS — E53.8 VITAMIN B 12 DEFICIENCY: ICD-10-CM

## 2020-06-08 DIAGNOSIS — E03.9 HYPOTHYROIDISM, UNSPECIFIED TYPE: ICD-10-CM

## 2020-06-08 DIAGNOSIS — E55.9 VITAMIN D DEFICIENCY: ICD-10-CM

## 2020-06-11 ENCOUNTER — TELEPHONE (OUTPATIENT)
Dept: ONCOLOGY | Facility: MEDICAL CENTER | Age: 50
End: 2020-06-11

## 2020-06-12 ENCOUNTER — OUTPATIENT INFUSION SERVICES (OUTPATIENT)
Dept: ONCOLOGY | Facility: MEDICAL CENTER | Age: 50
End: 2020-06-12
Attending: INTERNAL MEDICINE
Payer: COMMERCIAL

## 2020-06-12 VITALS
SYSTOLIC BLOOD PRESSURE: 125 MMHG | TEMPERATURE: 97.5 F | BODY MASS INDEX: 30.56 KG/M2 | RESPIRATION RATE: 18 BRPM | WEIGHT: 206.35 LBS | HEIGHT: 69 IN | OXYGEN SATURATION: 95 % | DIASTOLIC BLOOD PRESSURE: 88 MMHG | HEART RATE: 98 BPM

## 2020-06-12 DIAGNOSIS — E29.1 MALE HYPOGONADISM: ICD-10-CM

## 2020-06-12 DIAGNOSIS — D75.1 POLYCYTHEMIA: ICD-10-CM

## 2020-06-12 DIAGNOSIS — E53.8 VITAMIN B 12 DEFICIENCY: ICD-10-CM

## 2020-06-12 DIAGNOSIS — E29.1 HYPOGONADISM IN MALE: ICD-10-CM

## 2020-06-12 DIAGNOSIS — E55.9 VITAMIN D DEFICIENCY: ICD-10-CM

## 2020-06-12 DIAGNOSIS — E03.9 HYPOTHYROIDISM, UNSPECIFIED TYPE: ICD-10-CM

## 2020-06-12 LAB
25(OH)D3 SERPL-MCNC: 80 NG/ML (ref 30–100)
BASOPHILS # BLD AUTO: 0.9 % (ref 0–1.8)
BASOPHILS # BLD: 0.07 K/UL (ref 0–0.12)
EOSINOPHIL # BLD AUTO: 0.28 K/UL (ref 0–0.51)
EOSINOPHIL NFR BLD: 3.6 % (ref 0–6.9)
ERYTHROCYTE [DISTWIDTH] IN BLOOD BY AUTOMATED COUNT: 43.7 FL (ref 35.9–50)
HCT VFR BLD AUTO: 53.2 % (ref 42–52)
HCT VFR BLD CALC: 52 % (ref 42–52)
HGB BLD-MCNC: 17.6 G/DL (ref 14–18)
HGB BLD-MCNC: 17.7 G/DL (ref 14–18)
IMM GRANULOCYTES # BLD AUTO: 0.02 K/UL (ref 0–0.11)
IMM GRANULOCYTES NFR BLD AUTO: 0.3 % (ref 0–0.9)
LYMPHOCYTES # BLD AUTO: 1.97 K/UL (ref 1–4.8)
LYMPHOCYTES NFR BLD: 25.5 % (ref 22–41)
MCH RBC QN AUTO: 28.7 PG (ref 27–33)
MCHC RBC AUTO-ENTMCNC: 33.1 G/DL (ref 33.7–35.3)
MCV RBC AUTO: 86.6 FL (ref 81.4–97.8)
MONOCYTES # BLD AUTO: 0.75 K/UL (ref 0–0.85)
MONOCYTES NFR BLD AUTO: 9.7 % (ref 0–13.4)
NEUTROPHILS # BLD AUTO: 4.63 K/UL (ref 1.82–7.42)
NEUTROPHILS NFR BLD: 60 % (ref 44–72)
NRBC # BLD AUTO: 0 K/UL
NRBC BLD-RTO: 0 /100 WBC
PLATELET # BLD AUTO: 222 K/UL (ref 164–446)
PMV BLD AUTO: 10.7 FL (ref 9–12.9)
RBC # BLD AUTO: 6.14 M/UL (ref 4.7–6.1)
T3 SERPL-MCNC: 105 NG/DL (ref 60–181)
T3FREE SERPL-MCNC: 3.45 PG/ML (ref 2–4.4)
T4 FREE SERPL-MCNC: 0.94 NG/DL (ref 0.93–1.7)
TESTOST SERPL-MCNC: 483 NG/DL (ref 175–781)
TSH SERPL DL<=0.005 MIU/L-ACNC: 2.02 UIU/ML (ref 0.38–5.33)
VIT B12 SERPL-MCNC: 3334 PG/ML (ref 211–911)
WBC # BLD AUTO: 7.7 K/UL (ref 4.8–10.8)

## 2020-06-12 PROCEDURE — 82306 VITAMIN D 25 HYDROXY: CPT

## 2020-06-12 PROCEDURE — 85025 COMPLETE CBC W/AUTO DIFF WBC: CPT

## 2020-06-12 PROCEDURE — 99195 PHLEBOTOMY: CPT

## 2020-06-12 PROCEDURE — 36415 COLL VENOUS BLD VENIPUNCTURE: CPT

## 2020-06-12 PROCEDURE — 82607 VITAMIN B-12: CPT

## 2020-06-12 PROCEDURE — 84481 FREE ASSAY (FT-3): CPT

## 2020-06-12 PROCEDURE — 85014 HEMATOCRIT: CPT

## 2020-06-12 PROCEDURE — 84443 ASSAY THYROID STIM HORMONE: CPT

## 2020-06-12 PROCEDURE — 84270 ASSAY OF SEX HORMONE GLOBUL: CPT

## 2020-06-12 PROCEDURE — 84439 ASSAY OF FREE THYROXINE: CPT

## 2020-06-12 PROCEDURE — 84403 ASSAY OF TOTAL TESTOSTERONE: CPT

## 2020-06-12 PROCEDURE — 84480 ASSAY TRIIODOTHYRONINE (T3): CPT

## 2020-06-12 ASSESSMENT — FIBROSIS 4 INDEX: FIB4 SCORE: 1.06

## 2020-06-12 NOTE — PROGRESS NOTES
Patient presents for lab draw and possible therapeutic phlebotomy. IV started right AC flushes well with blood drawn as ordered. Hemoglobin 17.7. Therapeutic phlebotomy performed as ordered by MD. Patient tolerated well. Patient observed after and remains stable (see vital signs). PIV removed, tip intact, compression dressing to site. Patient scheduled for his next appointment and released in no acute distress.

## 2020-06-13 LAB — SHBG SERPL-SCNC: 12 NMOL/L (ref 11–80)

## 2020-06-17 ENCOUNTER — TELEMEDICINE (OUTPATIENT)
Dept: ENDOCRINOLOGY | Facility: MEDICAL CENTER | Age: 50
End: 2020-06-17
Payer: COMMERCIAL

## 2020-06-17 DIAGNOSIS — E55.9 VITAMIN D DEFICIENCY: ICD-10-CM

## 2020-06-17 DIAGNOSIS — Z79.899 HIGH RISK MEDICATION USE: ICD-10-CM

## 2020-06-17 DIAGNOSIS — E29.1 HYPOGONADISM IN MALE: ICD-10-CM

## 2020-06-17 DIAGNOSIS — E53.8 VITAMIN B 12 DEFICIENCY: ICD-10-CM

## 2020-06-17 DIAGNOSIS — E29.1 MALE HYPOGONADISM: ICD-10-CM

## 2020-06-17 DIAGNOSIS — E03.9 HYPOTHYROIDISM, UNSPECIFIED TYPE: ICD-10-CM

## 2020-06-17 DIAGNOSIS — D75.1 SECONDARY ERYTHROCYTOSIS: ICD-10-CM

## 2020-06-17 PROCEDURE — 99214 OFFICE O/P EST MOD 30 MIN: CPT | Mod: 95,CR | Performed by: INTERNAL MEDICINE

## 2020-06-17 RX ORDER — TESTOSTERONE CYPIONATE 200 MG/ML
125 INJECTION, SOLUTION INTRAMUSCULAR
Qty: 10 ML | Refills: 0 | Status: SHIPPED | OUTPATIENT
Start: 2020-06-17 | End: 2020-08-16 | Stop reason: SDUPTHER

## 2020-06-17 NOTE — PROGRESS NOTES
Endocrinology Clinic Progress Note  PCP: ARA Hunt  This encounter was conducted via Zoom .   Verbal consent was obtained. Patient's identity was verified.  HPI:  Neil Abdi Jr. is a 49 y.o. old patient who is seen today for review of his hypogonadism.  He is currently on Testosterone Cypionate 100 mg IM every 7 days.      Ref. Range 6/12/2020 09:20   Sex Hormone Bind Globulin Latest Ref Range: 11 - 80 nmol/L 12   Testosterone,Total Latest Ref Range: 175 - 781 ng/dL 483     Calculated free testosterone is 15.5    2. Secondary Erythrocytosis related to testosterone use.    last therapeutic phlebotomy done on 6/11/2020   Ref. Range 6/12/2020 09:20   Hemoglobin Latest Ref Range: 14.0 - 18.0 g/dL 17.6   Hematocrit Latest Ref Range: 42.0 - 52.0 % 53.2 (H)     ROS:  Constitutional: No weight loss  Cardiac: No palpitations or racing heart  Resp: No shortness of breath  Neuro: No numbness or tinging in feet  Endo: No heat or cold intolerance, no polyuria or polydipsia  All other systems were reviewed and were negative.    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    Social History:  Social History     Socioeconomic History   • Marital status:      Spouse name: Not on file   • Number of children: 0   • Years of education: Not on file   • Highest education level: Not on file   Occupational History   • Not on file   Social Needs    • Financial resource strain: Not on file   • Food insecurity     Worry: Not on file     Inability: Not on file   • Transportation needs     Medical: Not on file     Non-medical: Not on file   Tobacco Use   • Smoking status: Former Smoker     Last attempt to quit: 2002     Years since quittin.9   • Smokeless tobacco: Former User   Substance and Sexual Activity   • Alcohol use: Yes     Alcohol/week: 1.8 oz     Types: 1 Glasses of wine, 1 Cans of beer, 1 Shots of liquor per week     Comment: weekly   • Drug use: No   • Sexual activity: Yes     Partners: Female   Lifestyle   • Physical activity     Days per week: Not on file     Minutes per session: Not on file   • Stress: Not on file   Relationships   • Social connections     Talks on phone: Not on file     Gets together: Not on file     Attends Zoroastrianism service: Not on file     Active member of club or organization: Not on file     Attends meetings of clubs or organizations: Not on file     Relationship status: Not on file   • Intimate partner violence     Fear of current or ex partner: Not on file     Emotionally abused: Not on file     Physically abused: Not on file     Forced sexual activity: Not on file   Other Topics Concern   • Not on file   Social History Narrative   • Not on file       Family History:  Family History   Problem Relation Age of Onset   • Cancer Mother         colon   • Cancer Father         Prostate   • No Known Problems Sister    • No Known Problems Sister    • No Known Problems Sister    • Cancer Paternal Uncle         Unknown   • Cancer Maternal Grandfather         Lung   • Cancer Paternal Grandfather         Throat       Medications:    Current Outpatient Medications:   •  testosterone cypionate (DEPO-TESTOSTERONE) 200 MG/ML Solution injection, 0.63 mL by Intramuscular route every 7 days for 140 days., Disp: 10 mL, Rfl: 0  •  omeprazole (PRILOSEC) 20 MG delayed-release capsule, TAKE 1 CAPSULE BY MOUTH EVERY DAY, Disp: 90 Cap,  "Rfl: 0  •  lisinopril (PRINIVIL) 40 MG tablet, TAKE 1 TABLET BY MOUTH EVERY DAY, Disp: 90 Tab, Rfl: 0  •  BD DISP NEEDLES 22G X 1-1/2\" Misc, 1 Each by Intramuscular route every 7 days., Disp: 50 Each, Rfl: 0  •  syringe (B-D SYRINGE LUER-MYCHAL 1CC) 1 ML Misc, 1 mL by Does not apply route every 7 days., Disp: 50 Each, Rfl: 0  •  allopurinol (ZYLOPRIM) 300 MG Tab, TAKE 1 TABLET BY MOUTH EVERY DAY, Disp: 90 Tab, Rfl: 3  •  sildenafil citrate (VIAGRA) 100 MG tablet, Take 1 Tab by mouth as needed for Erectile Dysfunction., Disp: 10 Tab, Rfl: 3  •  raNITidine (ZANTAC) 150 MG Tab, Take 150 mg by mouth every day., Disp: , Rfl: 0  •  azelastine (ASTELIN) 137 MCG/SPRAY nasal spray, Spray 2 Sprays in nose 2 Times a Day. Each Nostril, Disp: , Rfl: 3  •  montelukast (SINGULAIR) 10 MG Tab, TAKE 1 TABLET BY MOUTH EVERY DAY, Disp: 90 Tab, Rfl: 0  •  Fexofenadine HCl (ALLEGRA ALLERGY PO), Take  by mouth., Disp: , Rfl:   •  albuterol 108 (90 Base) MCG/ACT Aero Soln inhalation aerosol, Inhale 2 Puffs by mouth every 6 hours as needed for Shortness of Breath., Disp: 8.5 g, Rfl: 6    Labs: Reviewed    Physical Examination:  Vital signs: There were no vitals taken for this visit. There is no height or weight on file to calculate BMI.  General: No apparent distress, cooperative  Eyes: No scleral icterus or discharge  ENMT: Normal on external inspection of nose, lips, normal thyroid exam  Neck: No abnormal masses on inspection  Resp: Normal effort, clear to auscultation bilaterally   CVS: Regular rate and rhythm, S1 S2 normal, no murmur   Extremities: No edema  Abdomen: abdominal obesity present  Neuro: Alert and oriented  Skin: No rash  Psych: Normal mood and affect, intact memory and able to make informed decisions    Assessment and Plan:  1. Hypogonadism in male  Continue testosterone replacement.    2. Erthyrocytosis:  Sec to testosterone treatment; Therapeutic phlebotomy for erythrocytosis secondary to testosterone replacement therapy. " Form faxed to [facility].       Return in about 4 months (around 10/17/2020).    Thank you for allowing me to participate in the care of this patient.    Margarito Youssef M.D.  06/16/20    CC:   Oswald Stinson, A.P.R.N.    This note was created using voice recognition software (Dragon). The accuracy of the dictation is limited by the abilities of the software. I have reviewed the note prior to signing, however some errors in grammar and context are still possible. If you have any questions related to this note please do not hesitate to contact our office.

## 2020-06-28 DIAGNOSIS — K21.9 GASTROESOPHAGEAL REFLUX DISEASE WITHOUT ESOPHAGITIS: ICD-10-CM

## 2020-06-29 RX ORDER — OMEPRAZOLE 20 MG/1
CAPSULE, DELAYED RELEASE ORAL
Qty: 90 CAP | Refills: 0 | Status: SHIPPED | OUTPATIENT
Start: 2020-06-29 | End: 2020-09-25

## 2020-08-16 DIAGNOSIS — E29.1 MALE HYPOGONADISM: ICD-10-CM

## 2020-08-17 RX ORDER — NEEDLES, DISPOSABLE 25GX5/8"
1 NEEDLE, DISPOSABLE MISCELLANEOUS
Qty: 50 EACH | Refills: 0 | Status: SHIPPED | OUTPATIENT
Start: 2020-08-17 | End: 2021-06-14 | Stop reason: SDUPTHER

## 2020-08-17 NOTE — TELEPHONE ENCOUNTER
Received request via: Pharmacy    Was the patient seen in the last year in this department? Yes    Does the patient have an active prescription (recently filled or refills available) for medication(s) requested? No       testosterone cypionate (DEPO-TESTOSTERONE) 200 MG/ML Solution injection        Si.63 mL by Intramuscular route every 7 days for 140 days.

## 2020-08-17 NOTE — TELEPHONE ENCOUNTER
"Received request via: Pharmacy    Was the patient seen in the last year in this department? Yes    Does the patient have an active prescription (recently filled or refills available) for medication(s) requested? No       BD DISP NEEDLES 22G X 1-\" Misc        Si Each by Intramuscular route every 7 days.      "

## 2020-08-18 RX ORDER — TESTOSTERONE CYPIONATE 200 MG/ML
125 INJECTION, SOLUTION INTRAMUSCULAR
Qty: 10 ML | Refills: 0 | Status: SHIPPED | OUTPATIENT
Start: 2020-08-18 | End: 2020-10-20 | Stop reason: SDUPTHER

## 2020-09-03 ENCOUNTER — PATIENT MESSAGE (OUTPATIENT)
Dept: ENDOCRINOLOGY | Facility: MEDICAL CENTER | Age: 50
End: 2020-09-03

## 2020-09-03 DIAGNOSIS — I10 ESSENTIAL HYPERTENSION: ICD-10-CM

## 2020-09-03 RX ORDER — LISINOPRIL 40 MG/1
TABLET ORAL
Qty: 90 TAB | Refills: 0 | Status: SHIPPED | OUTPATIENT
Start: 2020-09-03 | End: 2021-03-17 | Stop reason: SDUPTHER

## 2020-09-04 NOTE — TELEPHONE ENCOUNTER
From: Neil Abdi Jr.  To: Margarito Youssef M.D.  Sent: 9/3/2020 8:13 PM PDT  Subject: Non-Urgent Medical Question    Hi. Hope you all are well. Got a call today. Rx for therapeutic phlebotomy has . Had to reschedule for  in hopes you all can renue that request soon. They are reaching out too. Thank you very much.

## 2020-09-14 ENCOUNTER — TELEPHONE (OUTPATIENT)
Dept: HEALTH INFORMATION MANAGEMENT | Facility: MEDICAL CENTER | Age: 50
End: 2020-09-14

## 2020-09-14 PROBLEM — E29.1 HYPOGONADISM IN MALE: Status: ACTIVE | Noted: 2020-09-14

## 2020-09-18 ENCOUNTER — OUTPATIENT INFUSION SERVICES (OUTPATIENT)
Dept: ONCOLOGY | Facility: MEDICAL CENTER | Age: 50
End: 2020-09-18
Attending: INTERNAL MEDICINE
Payer: COMMERCIAL

## 2020-09-18 VITALS
WEIGHT: 202.82 LBS | SYSTOLIC BLOOD PRESSURE: 143 MMHG | TEMPERATURE: 98 F | OXYGEN SATURATION: 98 % | DIASTOLIC BLOOD PRESSURE: 80 MMHG | HEIGHT: 70 IN | HEART RATE: 78 BPM | BODY MASS INDEX: 29.04 KG/M2 | RESPIRATION RATE: 18 BRPM

## 2020-09-18 DIAGNOSIS — E53.8 VITAMIN B 12 DEFICIENCY: ICD-10-CM

## 2020-09-18 DIAGNOSIS — E55.9 VITAMIN D DEFICIENCY: ICD-10-CM

## 2020-09-18 DIAGNOSIS — E29.1 HYPOGONADISM IN MALE: ICD-10-CM

## 2020-09-18 DIAGNOSIS — E03.9 HYPOTHYROIDISM, UNSPECIFIED TYPE: ICD-10-CM

## 2020-09-18 DIAGNOSIS — D75.1 POLYCYTHEMIA: ICD-10-CM

## 2020-09-18 LAB
25(OH)D3 SERPL-MCNC: 73 NG/ML (ref 30–100)
HCT VFR BLD CALC: 49 % (ref 42–52)
HGB BLD-MCNC: 16.7 G/DL (ref 14–18)
T3 SERPL-MCNC: 103 NG/DL (ref 60–181)
T3FREE SERPL-MCNC: 3.2 PG/ML (ref 2–4.4)
T4 FREE SERPL-MCNC: 0.96 NG/DL (ref 0.93–1.7)
TESTOST SERPL-MCNC: 503 NG/DL (ref 175–781)
TSH SERPL DL<=0.005 MIU/L-ACNC: 1.58 UIU/ML (ref 0.38–5.33)
VIT B12 SERPL-MCNC: 2689 PG/ML (ref 211–911)

## 2020-09-18 PROCEDURE — 84439 ASSAY OF FREE THYROXINE: CPT

## 2020-09-18 PROCEDURE — 84270 ASSAY OF SEX HORMONE GLOBUL: CPT

## 2020-09-18 PROCEDURE — 84480 ASSAY TRIIODOTHYRONINE (T3): CPT

## 2020-09-18 PROCEDURE — 85014 HEMATOCRIT: CPT

## 2020-09-18 PROCEDURE — 84403 ASSAY OF TOTAL TESTOSTERONE: CPT

## 2020-09-18 PROCEDURE — 84481 FREE ASSAY (FT-3): CPT

## 2020-09-18 PROCEDURE — 36415 COLL VENOUS BLD VENIPUNCTURE: CPT

## 2020-09-18 PROCEDURE — 84443 ASSAY THYROID STIM HORMONE: CPT

## 2020-09-18 PROCEDURE — 82607 VITAMIN B-12: CPT

## 2020-09-18 PROCEDURE — 82306 VITAMIN D 25 HYDROXY: CPT

## 2020-09-18 ASSESSMENT — FIBROSIS 4 INDEX: FIB4 SCORE: 0.96

## 2020-09-19 NOTE — PROGRESS NOTES
Patient to [facility] for TP.PIV inserted into right AC, flushed with + blood return.  Istat H&H drawn. Labs drawn per outside order. Patients HGB 16.7, and HCT is 49. Patient does not meet parameters for TP. Emailed scheduling for future appointment. PIV flushed with + blood return and removed, gauze and Coban applied as a dressing. Patient to home in care of self.

## 2020-09-20 LAB — SHBG SERPL-SCNC: 14 NMOL/L (ref 11–80)

## 2020-09-25 DIAGNOSIS — K21.9 GASTROESOPHAGEAL REFLUX DISEASE WITHOUT ESOPHAGITIS: ICD-10-CM

## 2020-09-25 RX ORDER — OMEPRAZOLE 20 MG/1
CAPSULE, DELAYED RELEASE ORAL
Qty: 90 CAP | Refills: 0 | Status: SHIPPED | OUTPATIENT
Start: 2020-09-25 | End: 2020-12-31 | Stop reason: SDUPTHER

## 2020-10-19 NOTE — PROGRESS NOTES
Endocrinology Clinic Progress Note  PCP: ARA Hunt    HPI:  Agueda Ramires Jr. is a 50 y.o. old patient who comes in today for review of endocrine problems.  He is feeling really good.    Hypogonadism:  He is currently on Testosterone Cypionate .55 ml IM every 7 days.  He did not need therapeutic phlebotomy on 9/18/20.  Results for AGUEDA RAMIRES JR. (MRN [id number]) as of 10/18/2020 18:34   Ref. Range 9/18/2020 16:35   Istat Hemoglobin Latest Ref Range: 14.0 - 18.0 g/dL 16.7   Istat Hematocrit Latest Ref Range: 42 - 52 % 49      Ref. Range 9/18/2020 16:29   Sex Hormone Bind Globulin Latest Ref Range: 11 - 80 nmol/L 14   Testosterone,Total Latest Ref Range: 175 - 781 ng/dL 503   Calculated FreeTestosterone is 15.5 ng/dl    ROS:  Constitutional: No unintentional weight loss  Endo: Denies excessive thirst or frequent urination  All other systems were reviewed and were negative.    Past Medical History:  Patient Active Problem List    Diagnosis Date Noted   • Hypogonadism in male 09/14/2020   • Lump in neck 03/12/2018   • Acne vulgaris 03/07/2017   • Chronic gout involving toe of left foot without tophus 03/07/2017   • Mixed hyperlipidemia 03/07/2017   • Essential hypertension 02/16/2017   • Gastroesophageal reflux disease without esophagitis 02/16/2017   • Multiple allergies 02/16/2017   • Low testosterone in male 02/16/2017   • Exercise-induced asthma 02/16/2017       Medications:    Current Outpatient Medications:   •  testosterone cypionate (DEPO-TESTOSTERONE) 200 MG/ML Solution injection, 0.63 mL by Intramuscular route every 7 days for 140 days., Disp: 10 mL, Rfl: 0  •  vardenafil (LEVITRA) 10 MG tablet, Take 1 Tab by mouth as needed., Disp: 10 Tab, Rfl: 3  •  omeprazole (PRILOSEC) 20 MG delayed-release capsule, TAKE 1 CAPSULE BY MOUTH EVERY DAY, Disp: 90 Cap, Rfl: 0  •  lisinopril (PRINIVIL) 40 MG tablet, TAKE 1 TABLET BY MOUTH EVERY DAY, Disp: 90 Tab, Rfl: 0  •  BD DISP  "NEEDLES 22G X 1-1/2\" Misc, 1 Each by Intramuscular route every 7 days., Disp: 50 Each, Rfl: 0  •  syringe (B-D SYRINGE LUER-MYCHAL 1CC) 1 ML Misc, 1 mL by Does not apply route every 7 days., Disp: 50 Each, Rfl: 0  •  allopurinol (ZYLOPRIM) 300 MG Tab, TAKE 1 TABLET BY MOUTH EVERY DAY, Disp: 90 Tab, Rfl: 3  •  sildenafil citrate (VIAGRA) 100 MG tablet, Take 1 Tab by mouth as needed for Erectile Dysfunction., Disp: 10 Tab, Rfl: 3  •  azelastine (ASTELIN) 137 MCG/SPRAY nasal spray, Spray 2 Sprays in nose 2 Times a Day. Each Nostril, Disp: , Rfl: 3  •  montelukast (SINGULAIR) 10 MG Tab, TAKE 1 TABLET BY MOUTH EVERY DAY, Disp: 90 Tab, Rfl: 0  •  albuterol 108 (90 Base) MCG/ACT Aero Soln inhalation aerosol, Inhale 2 Puffs by mouth every 6 hours as needed for Shortness of Breath., Disp: 8.5 g, Rfl: 6    Labs: Reviewed    Physical Examination:  Vital signs: /80   Pulse 92   Ht 1.765 m (5' 9.5\")   Wt 91.6 kg (202 lb)   SpO2 98%   BMI 29.40 kg/m²  Body mass index is 29.4 kg/m².  General: No apparent distress, cooperative  Eyes: No scleral icterus or discharge  ENMT: Normal on external inspection of nose, lips, normal thyroid exam  Neck: No abnormal masses on inspection  Resp: Normal effort, clear to auscultation bilaterally   CVS: Regular rate and rhythm, S1 S2 normal, no murmur   Extremities: No edema  Abdomen: abdominal obesity present  Neuro: Alert and oriented  Skin: No rash  Psych: Normal mood and affect, intact memory and able to make informed decisions    Assessment and Plan:    1. Male hypogonadism  Continue current dose of testosterone replacement therapy.  Feels overall well    2. Erythrocytosis  Therapeutic phlebotomy for erythrocytosis secondary to testosterone replacement therapy.     3. Essential hypertension  Controlled.     Return in about 3 months (around 1/20/2021).    Thank you for allowing me to participate in the care of this patient.    Margarito Youssef M.D.    CC:   Oswald Stinson, " A.P.R.N.    This note was created using voice recognition software (Dragon). The accuracy of the dictation is limited by the abilities of the software. I have reviewed the note prior to signing, however some errors in grammar and context are still possible. If you have any questions related to this note please do not hesitate to contact our office.

## 2020-10-20 ENCOUNTER — OFFICE VISIT (OUTPATIENT)
Dept: ENDOCRINOLOGY | Facility: MEDICAL CENTER | Age: 50
End: 2020-10-20
Attending: INTERNAL MEDICINE
Payer: COMMERCIAL

## 2020-10-20 VITALS
OXYGEN SATURATION: 98 % | HEIGHT: 70 IN | HEART RATE: 92 BPM | DIASTOLIC BLOOD PRESSURE: 80 MMHG | WEIGHT: 202 LBS | SYSTOLIC BLOOD PRESSURE: 118 MMHG | BODY MASS INDEX: 28.92 KG/M2

## 2020-10-20 DIAGNOSIS — E53.8 VITAMIN B 12 DEFICIENCY: ICD-10-CM

## 2020-10-20 DIAGNOSIS — N52.9 ERECTILE DYSFUNCTION, UNSPECIFIED ERECTILE DYSFUNCTION TYPE: ICD-10-CM

## 2020-10-20 DIAGNOSIS — D75.1 ERYTHROCYTOSIS: ICD-10-CM

## 2020-10-20 DIAGNOSIS — E29.1 MALE HYPOGONADISM: ICD-10-CM

## 2020-10-20 DIAGNOSIS — I10 ESSENTIAL HYPERTENSION: ICD-10-CM

## 2020-10-20 DIAGNOSIS — E55.9 VITAMIN D DEFICIENCY: ICD-10-CM

## 2020-10-20 DIAGNOSIS — Z79.899 HIGH RISK MEDICATION USE: ICD-10-CM

## 2020-10-20 PROCEDURE — 99212 OFFICE O/P EST SF 10 MIN: CPT | Performed by: INTERNAL MEDICINE

## 2020-10-20 PROCEDURE — 99214 OFFICE O/P EST MOD 30 MIN: CPT | Performed by: INTERNAL MEDICINE

## 2020-10-20 RX ORDER — TESTOSTERONE CYPIONATE 200 MG/ML
125 INJECTION, SOLUTION INTRAMUSCULAR
Qty: 10 ML | Refills: 0 | Status: SHIPPED | OUTPATIENT
Start: 2020-10-20 | End: 2021-03-09

## 2020-10-20 RX ORDER — VARDENAFIL HYDROCHLORIDE 10 MG/1
10 TABLET ORAL PRN
Qty: 10 TAB | Refills: 3 | Status: SHIPPED | OUTPATIENT
Start: 2020-10-20 | End: 2020-10-22 | Stop reason: SDUPTHER

## 2020-10-20 ASSESSMENT — FIBROSIS 4 INDEX: FIB4 SCORE: 0.96

## 2020-10-22 DIAGNOSIS — N52.9 ERECTILE DYSFUNCTION, UNSPECIFIED ERECTILE DYSFUNCTION TYPE: ICD-10-CM

## 2020-10-23 NOTE — TELEPHONE ENCOUNTER
Received request via: Pharmacy    Was the patient seen in the last year in this department? Yes    Does the patient have an active prescription (recently filled or refills available) for medication(s) requested? No.    vardenafil (LEVITRA) 10 MG tablet        Sig: Take 1 Tab by mouth as needed.

## 2020-10-24 RX ORDER — VARDENAFIL HYDROCHLORIDE 10 MG/1
10 TABLET ORAL PRN
Qty: 10 TAB | Refills: 3 | Status: SHIPPED | OUTPATIENT
Start: 2020-10-24 | End: 2020-10-26 | Stop reason: SDUPTHER

## 2020-10-26 DIAGNOSIS — N52.9 ERECTILE DYSFUNCTION, UNSPECIFIED ERECTILE DYSFUNCTION TYPE: ICD-10-CM

## 2020-10-26 RX ORDER — VARDENAFIL HYDROCHLORIDE 10 MG/1
TABLET ORAL
Qty: 10 TAB | Refills: 3 | Status: SHIPPED | OUTPATIENT
Start: 2020-10-26 | End: 2020-11-02 | Stop reason: SDUPTHER

## 2020-10-28 ENCOUNTER — TELEPHONE (OUTPATIENT)
Dept: ENDOCRINOLOGY | Facility: MEDICAL CENTER | Age: 50
End: 2020-10-28

## 2020-10-28 NOTE — TELEPHONE ENCOUNTER
Patient's wife called for him requesting paper RX for vardenafil (LEVITRA) 10 MG tablet  - they are needing paper RX so they can find the best pharmacy due to the price $400.00    He sees Dr. Youssef, she stated pt was just here his next appt in April next yr.  Please notify patient when ready to . #[phone]    Thanks

## 2020-11-02 DIAGNOSIS — N52.9 ERECTILE DYSFUNCTION, UNSPECIFIED ERECTILE DYSFUNCTION TYPE: ICD-10-CM

## 2020-11-02 RX ORDER — VARDENAFIL HYDROCHLORIDE 10 MG/1
TABLET ORAL
Qty: 10 TAB | Refills: 3 | Status: SHIPPED
Start: 2020-11-02 | End: 2020-11-03

## 2020-11-03 DIAGNOSIS — N52.9 ERECTILE DYSFUNCTION, UNSPECIFIED ERECTILE DYSFUNCTION TYPE: ICD-10-CM

## 2020-11-03 RX ORDER — VARDENAFIL HYDROCHLORIDE 20 MG/1
20 TABLET ORAL PRN
Qty: 10 TAB | Refills: 3 | Status: SHIPPED | OUTPATIENT
Start: 2020-11-03 | End: 2020-11-16 | Stop reason: SDUPTHER

## 2020-11-16 DIAGNOSIS — N52.9 ERECTILE DYSFUNCTION, UNSPECIFIED ERECTILE DYSFUNCTION TYPE: ICD-10-CM

## 2020-11-16 RX ORDER — VARDENAFIL HYDROCHLORIDE 20 MG/1
20 TABLET ORAL PRN
Qty: 10 TAB | Refills: 3 | Status: SHIPPED
Start: 2020-11-16 | End: 2020-11-20 | Stop reason: SDUPTHER

## 2020-11-20 ENCOUNTER — TELEPHONE (OUTPATIENT)
Dept: ENDOCRINOLOGY | Facility: MEDICAL CENTER | Age: 50
End: 2020-11-20

## 2020-11-20 DIAGNOSIS — N52.9 ERECTILE DYSFUNCTION, UNSPECIFIED ERECTILE DYSFUNCTION TYPE: ICD-10-CM

## 2020-11-20 RX ORDER — VARDENAFIL HYDROCHLORIDE 20 MG/1
20 TABLET ORAL PRN
Qty: 10 TAB | Refills: 3 | Status: SHIPPED
Start: 2020-11-20

## 2020-11-20 NOTE — TELEPHONE ENCOUNTER
DOCUMENTATION OF PAR STATUS:    1. Name of Medication & Dose: Testosterone 200mg/ML     2. Name of Prescription Coverage Company & phone #: [phone]    3. Date Prior Auth Submitted: 11/20/20    4. What information was given to obtain insurance decision? Last office notes and labs results.     5. Prior Auth Status? Approved from 11/20/20-11/20/21    6. Patient Notified: yes    This medication did not need a peer to peer answers were given incorrectly to the insurance company. Insurance company states.

## 2020-11-28 ENCOUNTER — APPOINTMENT (OUTPATIENT)
Dept: ONCOLOGY | Facility: MEDICAL CENTER | Age: 50
End: 2020-11-28
Attending: INTERNAL MEDICINE
Payer: COMMERCIAL

## 2020-12-21 DIAGNOSIS — M1A.9XX0 CHRONIC GOUT INVOLVING TOE OF LEFT FOOT WITHOUT TOPHUS, UNSPECIFIED CAUSE: ICD-10-CM

## 2020-12-21 RX ORDER — ALLOPURINOL 300 MG/1
TABLET ORAL
Qty: 90 TAB | Refills: 0 | OUTPATIENT
Start: 2020-12-21

## 2020-12-31 DIAGNOSIS — K21.9 GASTROESOPHAGEAL REFLUX DISEASE WITHOUT ESOPHAGITIS: ICD-10-CM

## 2020-12-31 RX ORDER — OMEPRAZOLE 20 MG/1
20 CAPSULE, DELAYED RELEASE ORAL
Qty: 90 CAP | Refills: 0 | Status: SHIPPED | OUTPATIENT
Start: 2020-12-31 | End: 2021-03-17 | Stop reason: SDUPTHER

## 2021-02-16 DIAGNOSIS — M1A.9XX0 CHRONIC GOUT INVOLVING TOE OF LEFT FOOT WITHOUT TOPHUS, UNSPECIFIED CAUSE: ICD-10-CM

## 2021-02-17 RX ORDER — ALLOPURINOL 300 MG/1
300 TABLET ORAL
Qty: 90 TABLET | Refills: 3 | OUTPATIENT
Start: 2021-02-17

## 2021-02-19 DIAGNOSIS — M1A.9XX0 CHRONIC GOUT INVOLVING TOE OF LEFT FOOT WITHOUT TOPHUS, UNSPECIFIED CAUSE: ICD-10-CM

## 2021-02-19 RX ORDER — ALLOPURINOL 300 MG/1
300 TABLET ORAL
Qty: 30 TABLET | Refills: 0 | Status: SHIPPED | OUTPATIENT
Start: 2021-02-19 | End: 2021-02-22 | Stop reason: SDUPTHER

## 2021-02-22 ENCOUNTER — TELEMEDICINE (OUTPATIENT)
Dept: MEDICAL GROUP | Facility: PHYSICIAN GROUP | Age: 51
End: 2021-02-22
Payer: COMMERCIAL

## 2021-02-22 VITALS
BODY MASS INDEX: 28.06 KG/M2 | DIASTOLIC BLOOD PRESSURE: 85 MMHG | HEIGHT: 70 IN | WEIGHT: 196 LBS | SYSTOLIC BLOOD PRESSURE: 130 MMHG

## 2021-02-22 DIAGNOSIS — M1A.9XX0 CHRONIC GOUT INVOLVING TOE OF LEFT FOOT WITHOUT TOPHUS, UNSPECIFIED CAUSE: ICD-10-CM

## 2021-02-22 DIAGNOSIS — F41.9 ANXIETY: ICD-10-CM

## 2021-02-22 DIAGNOSIS — I10 ESSENTIAL HYPERTENSION: ICD-10-CM

## 2021-02-22 PROCEDURE — 99214 OFFICE O/P EST MOD 30 MIN: CPT | Mod: 95,CR | Performed by: NURSE PRACTITIONER

## 2021-02-22 RX ORDER — ALLOPURINOL 300 MG/1
300 TABLET ORAL
Qty: 90 TABLET | Refills: 3 | Status: SHIPPED | OUTPATIENT
Start: 2021-02-22 | End: 2022-02-22

## 2021-02-22 RX ORDER — ALLOPURINOL 300 MG/1
300 TABLET ORAL
Qty: 90 TABLET | Refills: 0 | OUTPATIENT
Start: 2021-02-22

## 2021-02-22 ASSESSMENT — FIBROSIS 4 INDEX: FIB4 SCORE: 0.96

## 2021-02-22 NOTE — ASSESSMENT & PLAN NOTE
Chronic in nature.  Overall stable but patient states that he has had multiple recent episodes of more mild pain that has lasted a couple days.  Patient states he has not been able to correlate it specifically with increases in red meat or alcohol in his diet but states on second thought it may have been associated with him and his wife date night.  Patient states that he is wondering if the dose of medication he is on is not controlling his symptoms well enough and is interested in finding out about increasing the dose.

## 2021-02-22 NOTE — PROGRESS NOTES
Virtual Visit: Established Patient   This visit was conducted via Zoom using secure and encrypted videoconferencing technology. The patient was in a private location in the state of [place].    The patient's identity was confirmed and verbal consent was obtained for this virtual visit.    Subjective:   CC:   Chief Complaint   Patient presents with   • Medication Refill     Allopurinol        Neil Abdi Jr. is a 50 y.o. male presenting for evaluation and management of:    Anxiety  Chronic in nature.  Overall states that anxiety is fair to intermittent and that he only uses a half a tab of medication maybe once a month.  States that he leaves his last prescription for the medication may have been approximately 3 years ago and that he does still have some left, but mentions that he will need a refill in the future.  Patient is unable to remember the dose or milligrams of the tablets and initially is unable to remember the name of the medication until prompted.  States he will notice onset of sob and discomfort.     Chronic gout involving toe of left foot without tophus  Chronic in nature.  Overall stable but patient states that he has had multiple recent episodes of more mild pain that has lasted a couple days.  Patient states he has not been able to correlate it specifically with increases in red meat or alcohol in his diet but states on second thought it may have been associated with him and his wife date night.  Patient states that he is wondering if the dose of medication he is on is not controlling his symptoms well enough and is interested in finding out about increasing the dose.         ROS   Denies any recent fevers or chills. No nausea or vomiting. No chest pains or shortness of breath.     No Known Allergies    Current medicines (including changes today)  Current Outpatient Medications   Medication Sig Dispense Refill   • allopurinol (ZYLOPRIM) 300 MG Tab Take 1 tablet by mouth every day. 90  "tablet 3   • omeprazole (PRILOSEC) 20 MG delayed-release capsule Take 1 Cap by mouth every day. 90 Cap 0   • vardenafil (LEVITRA) 20 MG tablet Take 1 Tab by mouth as needed (erectile dysfunction). 10 Tab 3   • testosterone cypionate (DEPO-TESTOSTERONE) 200 MG/ML Solution injection 0.63 mL by Intramuscular route every 7 days for 140 days. 10 mL 0   • lisinopril (PRINIVIL) 40 MG tablet TAKE 1 TABLET BY MOUTH EVERY DAY 90 Tab 0   • sildenafil citrate (VIAGRA) 100 MG tablet Take 1 Tab by mouth as needed for Erectile Dysfunction. 10 Tab 3   • azelastine (ASTELIN) 137 MCG/SPRAY nasal spray Manson 2 Sprays in nose 2 Times a Day. Each Nostril  3   • montelukast (SINGULAIR) 10 MG Tab TAKE 1 TABLET BY MOUTH EVERY DAY 90 Tab 0   • albuterol 108 (90 Base) MCG/ACT Aero Soln inhalation aerosol Inhale 2 Puffs by mouth every 6 hours as needed for Shortness of Breath. 8.5 g 6   • BD DISP NEEDLES 22G X 1-1/2\" Misc 1 Each by Intramuscular route every 7 days. 50 Each 0   • syringe (B-D SYRINGE LUER-MYCHAL 1CC) 1 ML Misc 1 mL by Does not apply route every 7 days. 50 Each 0     No current facility-administered medications for this visit.       Patient Active Problem List    Diagnosis Date Noted   • Anxiety 02/22/2021   • Hypogonadism in male 09/14/2020   • Lump in neck 03/12/2018   • Acne vulgaris 03/07/2017   • Chronic gout involving toe of left foot without tophus 03/07/2017   • Mixed hyperlipidemia 03/07/2017   • Essential hypertension 02/16/2017   • Gastroesophageal reflux disease without esophagitis 02/16/2017   • Multiple allergies 02/16/2017   • Low testosterone in male 02/16/2017   • Exercise-induced asthma 02/16/2017       Family History   Problem Relation Age of Onset   • Cancer Mother         colon   • Cancer Father         Prostate   • No Known Problems Sister    • No Known Problems Sister    • No Known Problems Sister    • Cancer Paternal Uncle         Unknown   • Cancer Maternal Grandfather         Lung   • Cancer Paternal " "Grandfather         Throat       He  has a past medical history of Anxiety, Asthma, GERD (gastroesophageal reflux disease), Hypertension, and Migraine.  He  has a past surgical history that includes laminotomy.       Objective:   /85 (BP Location: Left arm, Patient Position: Sitting, BP Cuff Size: Adult) Comment: Pt reported  Ht 1.778 m (5' 10\") Comment: Pt reported  Wt 88.9 kg (196 lb) Comment: Pt reported  BMI 28.12 kg/m²     Physical Exam:  Constitutional: Alert, no distress, well-groomed.  Skin: No rashes in visible areas.  Eye: Round. Conjunctiva clear, lids normal. No icterus.   ENMT: Lips pink without lesions, good dentition, moist mucous membranes. Phonation normal.  Neck: No masses, no thyromegaly. Moves freely without pain.  Respiratory: Unlabored respiratory effort, no cough or audible wheeze  Psych: Alert and oriented x3, normal affect and mood.       Assessment and Plan:   The following treatment plan was discussed:     1. Chronic gout involving toe of left foot without tophus, unspecified cause  - allopurinol (ZYLOPRIM) 300 MG Tab; Take 1 tablet by mouth every day.  Dispense: 90 tablet; Refill: 3  - URIC ACID; Future    2. Essential hypertension  - Comp Metabolic Panel; Future  - Lipid Profile; Future    3. Anxiety    Allopurinol is refilled at this time.  Patient states that all other conditions are stable.  Pressure today is 135/85 and patient is stable on current medications without side effects.  Plan at this time is to get a uric acid related to flares.  Patient mentions that he takes Xanax intermittently but states he is not sure what the doses or how many tablets last refill was for.  States that that may have been filled approximately 3 years ago at this time patient will send picture of medication bottle and I will consider appropriateness of refill.  Patient is counseled extensively regarding requirements related to controlled substance and is aware that he does need appointments for " any refills.    Follow-up: Return if symptoms worsen or fail to improve.

## 2021-02-22 NOTE — ASSESSMENT & PLAN NOTE
Chronic in nature.  Overall states that anxiety is fair to intermittent and that he only uses a half a tab of medication maybe once a month.  States that he leaves his last prescription for the medication may have been approximately 3 years ago and that he does still have some left, but mentions that he will need a refill in the future.  Patient is unable to remember the dose or milligrams of the tablets and initially is unable to remember the name of the medication until prompted.  States he will notice onset of sob and discomfort.

## 2021-03-17 DIAGNOSIS — I10 ESSENTIAL HYPERTENSION: ICD-10-CM

## 2021-03-17 DIAGNOSIS — K21.9 GASTROESOPHAGEAL REFLUX DISEASE WITHOUT ESOPHAGITIS: ICD-10-CM

## 2021-03-18 RX ORDER — LISINOPRIL 40 MG/1
40 TABLET ORAL
Qty: 90 TABLET | Refills: 3 | Status: SHIPPED | OUTPATIENT
Start: 2021-03-18 | End: 2022-02-22

## 2021-03-18 RX ORDER — OMEPRAZOLE 20 MG/1
20 CAPSULE, DELAYED RELEASE ORAL
Qty: 90 CAPSULE | Refills: 3 | Status: SHIPPED | OUTPATIENT
Start: 2021-03-18 | End: 2022-05-19

## 2021-03-22 ENCOUNTER — TELEPHONE (OUTPATIENT)
Dept: ENDOCRINOLOGY | Facility: MEDICAL CENTER | Age: 51
End: 2021-03-22

## 2021-03-22 DIAGNOSIS — N52.9 ERECTILE DYSFUNCTION, UNSPECIFIED ERECTILE DYSFUNCTION TYPE: ICD-10-CM

## 2021-03-22 DIAGNOSIS — Z79.899 HIGH RISK MEDICATION USE: ICD-10-CM

## 2021-03-22 DIAGNOSIS — E29.1 MALE HYPOGONADISM: ICD-10-CM

## 2021-03-22 NOTE — TELEPHONE ENCOUNTER
Patient had to reschedule Resource appointment in April to Dr. Boyle in June. Patient is requesting labs for Testosterone and Sex Hormone Binding Globulin to be ordered prior to this appt.

## 2021-03-30 DIAGNOSIS — E29.1 MALE HYPOGONADISM: ICD-10-CM

## 2021-03-30 RX ORDER — TESTOSTERONE CYPIONATE 200 MG/ML
126 INJECTION, SOLUTION INTRAMUSCULAR
Qty: 10 ML | Refills: 0 | Status: SHIPPED | OUTPATIENT
Start: 2021-03-30 | End: 2021-09-13

## 2021-06-02 ENCOUNTER — OUTPATIENT INFUSION SERVICES (OUTPATIENT)
Dept: ONCOLOGY | Facility: MEDICAL CENTER | Age: 51
End: 2021-06-02
Attending: INTERNAL MEDICINE
Payer: COMMERCIAL

## 2021-06-02 VITALS
RESPIRATION RATE: 18 BRPM | BODY MASS INDEX: 30.2 KG/M2 | HEIGHT: 69 IN | WEIGHT: 203.93 LBS | TEMPERATURE: 97.3 F | OXYGEN SATURATION: 98 % | SYSTOLIC BLOOD PRESSURE: 131 MMHG | DIASTOLIC BLOOD PRESSURE: 84 MMHG | HEART RATE: 62 BPM

## 2021-06-02 DIAGNOSIS — E29.1 MALE HYPOGONADISM: ICD-10-CM

## 2021-06-02 DIAGNOSIS — I10 ESSENTIAL HYPERTENSION: ICD-10-CM

## 2021-06-02 DIAGNOSIS — Z79.899 HIGH RISK MEDICATION USE: ICD-10-CM

## 2021-06-02 DIAGNOSIS — N52.9 ERECTILE DYSFUNCTION, UNSPECIFIED ERECTILE DYSFUNCTION TYPE: ICD-10-CM

## 2021-06-02 DIAGNOSIS — M1A.9XX0 CHRONIC GOUT INVOLVING TOE OF LEFT FOOT WITHOUT TOPHUS, UNSPECIFIED CAUSE: ICD-10-CM

## 2021-06-02 LAB
ALBUMIN SERPL BCP-MCNC: 4.5 G/DL (ref 3.2–4.9)
ALBUMIN/GLOB SERPL: 1.6 G/DL
ALP SERPL-CCNC: 64 U/L (ref 30–99)
ALT SERPL-CCNC: 64 U/L (ref 2–50)
ANION GAP SERPL CALC-SCNC: 13 MMOL/L (ref 7–16)
AST SERPL-CCNC: 47 U/L (ref 12–45)
BILIRUB SERPL-MCNC: 0.5 MG/DL (ref 0.1–1.5)
BUN SERPL-MCNC: 20 MG/DL (ref 8–22)
CALCIUM SERPL-MCNC: 9 MG/DL (ref 8.5–10.5)
CHLORIDE SERPL-SCNC: 107 MMOL/L (ref 96–112)
CHOLEST SERPL-MCNC: 187 MG/DL (ref 100–199)
CO2 SERPL-SCNC: 21 MMOL/L (ref 20–33)
CREAT SERPL-MCNC: 1.44 MG/DL (ref 0.5–1.4)
FERRITIN SERPL-MCNC: 110 NG/ML (ref 22–322)
GLOBULIN SER CALC-MCNC: 2.9 G/DL (ref 1.9–3.5)
GLUCOSE SERPL-MCNC: 99 MG/DL (ref 65–99)
HCT VFR BLD AUTO: 52.4 % (ref 42–52)
HCT VFR BLD CALC: 51 % (ref 42–52)
HDLC SERPL-MCNC: 43 MG/DL
HGB BLD-MCNC: 17.2 G/DL (ref 14–18)
HGB BLD-MCNC: 17.3 G/DL (ref 14–18)
IRON SATN MFR SERPL: 50 % (ref 15–55)
IRON SERPL-MCNC: 183 UG/DL (ref 50–180)
LDLC SERPL CALC-MCNC: 78 MG/DL
POTASSIUM SERPL-SCNC: 4 MMOL/L (ref 3.6–5.5)
PROLACTIN SERPL-MCNC: 6.86 NG/ML (ref 2.1–17.7)
PROT SERPL-MCNC: 7.4 G/DL (ref 6–8.2)
SODIUM SERPL-SCNC: 141 MMOL/L (ref 135–145)
TIBC SERPL-MCNC: 366 UG/DL (ref 250–450)
TRIGL SERPL-MCNC: 329 MG/DL (ref 0–149)
UIBC SERPL-MCNC: 183 UG/DL (ref 110–370)
URATE SERPL-MCNC: 3.8 MG/DL (ref 2.5–8.3)

## 2021-06-02 PROCEDURE — 85018 HEMOGLOBIN: CPT

## 2021-06-02 PROCEDURE — 84550 ASSAY OF BLOOD/URIC ACID: CPT

## 2021-06-02 PROCEDURE — 36415 COLL VENOUS BLD VENIPUNCTURE: CPT

## 2021-06-02 PROCEDURE — 82728 ASSAY OF FERRITIN: CPT

## 2021-06-02 PROCEDURE — 83550 IRON BINDING TEST: CPT

## 2021-06-02 PROCEDURE — 84402 ASSAY OF FREE TESTOSTERONE: CPT

## 2021-06-02 PROCEDURE — 85014 HEMATOCRIT: CPT

## 2021-06-02 PROCEDURE — 84146 ASSAY OF PROLACTIN: CPT

## 2021-06-02 PROCEDURE — 84403 ASSAY OF TOTAL TESTOSTERONE: CPT

## 2021-06-02 PROCEDURE — 80061 LIPID PANEL: CPT

## 2021-06-02 PROCEDURE — 83540 ASSAY OF IRON: CPT

## 2021-06-02 PROCEDURE — 80053 COMPREHEN METABOLIC PANEL: CPT

## 2021-06-02 PROCEDURE — 99195 PHLEBOTOMY: CPT

## 2021-06-02 PROCEDURE — 84270 ASSAY OF SEX HORMONE GLOBUL: CPT

## 2021-06-02 ASSESSMENT — FIBROSIS 4 INDEX: FIB4 SCORE: 0.96

## 2021-06-03 NOTE — PROGRESS NOTES
Patient arrived ambulatory to [facility] for therapeutic phlebotomy.  PIV established with good blood return noted.  Labs drawn per order.  Hgb 17.3.  250 ml whole blood removed per order, he tolerated well.  Orthostatic VS WNL.   PIV flushed, removed, and gauze/coban placed.  Next appointment scheduled and he ambulated out of clinic in no apparent distress.

## 2021-06-04 LAB
SHBG SERPL-SCNC: 13 NMOL/L (ref 11–80)
TESTOST FREE MFR SERPL: 2.8 % (ref 1.6–2.9)
TESTOST FREE SERPL-MCNC: 179 PG/ML (ref 47–244)
TESTOST SERPL-MCNC: 643 NG/DL (ref 300–890)

## 2021-06-14 DIAGNOSIS — E29.1 MALE HYPOGONADISM: ICD-10-CM

## 2021-06-14 DIAGNOSIS — E29.1 HYPOGONADISM IN MALE: ICD-10-CM

## 2021-06-15 RX ORDER — NEEDLES, DISPOSABLE 25GX5/8"
1 NEEDLE, DISPOSABLE MISCELLANEOUS
Qty: 50 EACH | Refills: 0 | Status: SHIPPED | OUTPATIENT
Start: 2021-06-15 | End: 2022-07-12

## 2021-06-15 RX ORDER — SYRINGE, DISPOSABLE, 1 ML
1 SYRINGE, EMPTY DISPOSABLE MISCELLANEOUS
Qty: 50 EACH | Refills: 0 | Status: SHIPPED | OUTPATIENT
Start: 2021-06-15

## 2021-06-18 ENCOUNTER — OFFICE VISIT (OUTPATIENT)
Dept: ENDOCRINOLOGY | Facility: MEDICAL CENTER | Age: 51
End: 2021-06-18
Attending: INTERNAL MEDICINE
Payer: COMMERCIAL

## 2021-06-18 VITALS
WEIGHT: 200 LBS | HEIGHT: 69 IN | SYSTOLIC BLOOD PRESSURE: 122 MMHG | HEART RATE: 87 BPM | OXYGEN SATURATION: 95 % | DIASTOLIC BLOOD PRESSURE: 72 MMHG | BODY MASS INDEX: 29.62 KG/M2

## 2021-06-18 DIAGNOSIS — N52.9 ERECTILE DYSFUNCTION, UNSPECIFIED ERECTILE DYSFUNCTION TYPE: ICD-10-CM

## 2021-06-18 DIAGNOSIS — E55.9 VITAMIN D DEFICIENCY: ICD-10-CM

## 2021-06-18 DIAGNOSIS — E53.8 B12 DEFICIENCY: ICD-10-CM

## 2021-06-18 DIAGNOSIS — E29.1 HYPOGONADISM IN MALE: ICD-10-CM

## 2021-06-18 DIAGNOSIS — Z79.899 HIGH RISK MEDICATION USE: ICD-10-CM

## 2021-06-18 DIAGNOSIS — E29.1 MALE HYPOGONADISM: ICD-10-CM

## 2021-06-18 PROCEDURE — 99214 OFFICE O/P EST MOD 30 MIN: CPT | Performed by: INTERNAL MEDICINE

## 2021-06-18 PROCEDURE — 99211 OFF/OP EST MAY X REQ PHY/QHP: CPT | Performed by: INTERNAL MEDICINE

## 2021-06-18 ASSESSMENT — PATIENT HEALTH QUESTIONNAIRE - PHQ9: CLINICAL INTERPRETATION OF PHQ2 SCORE: 0

## 2021-06-18 ASSESSMENT — FIBROSIS 4 INDEX: FIB4 SCORE: 1.35

## 2021-08-25 ENCOUNTER — OUTPATIENT INFUSION SERVICES (OUTPATIENT)
Dept: ONCOLOGY | Facility: MEDICAL CENTER | Age: 51
End: 2021-08-25
Attending: INTERNAL MEDICINE
Payer: COMMERCIAL

## 2021-08-25 VITALS
HEART RATE: 88 BPM | SYSTOLIC BLOOD PRESSURE: 116 MMHG | DIASTOLIC BLOOD PRESSURE: 81 MMHG | HEIGHT: 69 IN | BODY MASS INDEX: 30.04 KG/M2 | OXYGEN SATURATION: 100 % | WEIGHT: 202.82 LBS | RESPIRATION RATE: 18 BRPM | TEMPERATURE: 98 F

## 2021-08-25 DIAGNOSIS — D75.1 POLYCYTHEMIA: ICD-10-CM

## 2021-08-25 LAB
HCT VFR BLD CALC: 54 % (ref 42–52)
HGB BLD-MCNC: 18.4 G/DL (ref 14–18)

## 2021-08-25 PROCEDURE — 36415 COLL VENOUS BLD VENIPUNCTURE: CPT

## 2021-08-25 PROCEDURE — 99195 PHLEBOTOMY: CPT

## 2021-08-25 PROCEDURE — 85014 HEMATOCRIT: CPT

## 2021-08-25 ASSESSMENT — FIBROSIS 4 INDEX: FIB4 SCORE: 1.35

## 2021-08-26 NOTE — PROGRESS NOTES
Patient arrived ambulatory to [facility] for therapeutic phlebotomy.  PIV established with good blood return noted.  Lab drawn and results within parameters to treat.  250 ml whole blood removed per order, patient tolerated well.  Orthostatic VS WNL.  Patient denies any lightheadedness, dizziness, or other discomfort.  PIV flushed, removed, and gauze/coban placed.  Next appointment scheduled and patient ambulated out of clinic in no apparent distress.

## 2021-09-08 DIAGNOSIS — E29.1 MALE HYPOGONADISM: ICD-10-CM

## 2021-09-10 NOTE — TELEPHONE ENCOUNTER
Received request via: Pharmacy    Was the patient seen in the last year in this department? Yes    Does the patient have an active prescription (recently filled or refills available) for medication(s) requested? No     REQUESTED MEDICATION:   Requested Prescriptions     Pending Prescriptions Disp Refills   • testosterone cypionate (DEPO-TESTOSTERONE) 200 MG/ML Solution injection [Pharmacy Med Name: TESTOSTERONE CYP 200MG/ML SDV 1ML] 10 mL      Sig: INJECT 0.63 ML INTRAMUSCULARLY INTO THE SHOULDER, THIGH, OR BUTTOCKS EVERY 7 DAYS FOR 90 DAYS

## 2021-09-13 DIAGNOSIS — E29.1 MALE HYPOGONADISM: ICD-10-CM

## 2021-09-13 RX ORDER — TESTOSTERONE CYPIONATE 200 MG/ML
INJECTION, SOLUTION INTRAMUSCULAR
Qty: 10 ML | Refills: 0 | Status: SHIPPED | OUTPATIENT
Start: 2021-09-13 | End: 2021-09-13 | Stop reason: SDUPTHER

## 2021-09-14 NOTE — TELEPHONE ENCOUNTER
Received request via: Pharmacy    Was the patient seen in the last year in this department? Yes    Does the patient have an active prescription (recently filled or refills available) for medication(s) requested? No       REQUESTED MEDICATION:   Requested Prescriptions     Pending Prescriptions Disp Refills   • testosterone cypionate (DEPO-TESTOSTERONE) 200 MG/ML Solution injection 10 mL 0

## 2021-09-17 RX ORDER — TESTOSTERONE CYPIONATE 200 MG/ML
125 INJECTION, SOLUTION INTRAMUSCULAR
Qty: 12 ML | Refills: 1 | Status: SHIPPED | OUTPATIENT
Start: 2021-09-17 | End: 2021-12-10

## 2021-10-20 ENCOUNTER — OUTPATIENT INFUSION SERVICES (OUTPATIENT)
Dept: ONCOLOGY | Facility: MEDICAL CENTER | Age: 51
End: 2021-10-20
Attending: INTERNAL MEDICINE
Payer: COMMERCIAL

## 2021-10-20 VITALS
BODY MASS INDEX: 30.04 KG/M2 | SYSTOLIC BLOOD PRESSURE: 110 MMHG | DIASTOLIC BLOOD PRESSURE: 80 MMHG | RESPIRATION RATE: 18 BRPM | HEIGHT: 69 IN | TEMPERATURE: 97.5 F | WEIGHT: 202.82 LBS | HEART RATE: 71 BPM | OXYGEN SATURATION: 100 %

## 2021-10-20 DIAGNOSIS — D75.1 POLYCYTHEMIA: ICD-10-CM

## 2021-10-20 LAB
HCT VFR BLD CALC: 53 % (ref 42–52)
HGB BLD-MCNC: 18 G/DL (ref 14–18)

## 2021-10-20 PROCEDURE — 36415 COLL VENOUS BLD VENIPUNCTURE: CPT

## 2021-10-20 PROCEDURE — 85014 HEMATOCRIT: CPT

## 2021-10-20 PROCEDURE — 99195 PHLEBOTOMY: CPT

## 2021-10-20 ASSESSMENT — FIBROSIS 4 INDEX: FIB4 SCORE: 1.38

## 2021-10-21 ENCOUNTER — PATIENT MESSAGE (OUTPATIENT)
Dept: ENDOCRINOLOGY | Facility: MEDICAL CENTER | Age: 51
End: 2021-10-21

## 2021-10-21 NOTE — PROGRESS NOTES
Neil arrives for the therapeutic phlebotomy. Labs drawn as ordered by MD.  Neil's Hgb 18 , Hct 53 %. 250 cc blood removed. Neil tolerated well. Orthostatic vitals stable, see flowsheet. Neil denies chest pain, shortness of breath, dizziness, or lightheadedness after treatment. Message left to schedulers to schedule next appointment. Discharged to self care; no apparent distress noted.

## 2021-10-22 NOTE — TELEPHONE ENCOUNTER
From: Neil Abdi Jr.  To: Physician Shen Boyle  Sent: 10/21/2021 2:19 PM PDT  Subject: Testosterone renewal    Good afternoon Doc. Got letter from [facility] stating you will need to contact them and renue approval for testosterone. Says for you to call [phone] to renue approval.

## 2022-01-05 ENCOUNTER — OUTPATIENT INFUSION SERVICES (OUTPATIENT)
Dept: ONCOLOGY | Facility: MEDICAL CENTER | Age: 52
End: 2022-01-05
Attending: INTERNAL MEDICINE
Payer: COMMERCIAL

## 2022-01-05 VITALS
WEIGHT: 202.82 LBS | HEART RATE: 88 BPM | DIASTOLIC BLOOD PRESSURE: 90 MMHG | HEIGHT: 69 IN | RESPIRATION RATE: 18 BRPM | OXYGEN SATURATION: 98 % | BODY MASS INDEX: 30.04 KG/M2 | SYSTOLIC BLOOD PRESSURE: 132 MMHG | TEMPERATURE: 98 F

## 2022-01-05 DIAGNOSIS — N52.9 ERECTILE DYSFUNCTION, UNSPECIFIED ERECTILE DYSFUNCTION TYPE: ICD-10-CM

## 2022-01-05 DIAGNOSIS — E53.8 B12 DEFICIENCY: ICD-10-CM

## 2022-01-05 DIAGNOSIS — E55.9 VITAMIN D DEFICIENCY: ICD-10-CM

## 2022-01-05 DIAGNOSIS — Z79.899 HIGH RISK MEDICATION USE: ICD-10-CM

## 2022-01-05 DIAGNOSIS — E29.1 HYPOGONADISM IN MALE: Primary | ICD-10-CM

## 2022-01-05 DIAGNOSIS — E29.1 MALE HYPOGONADISM: ICD-10-CM

## 2022-01-05 LAB
25(OH)D3 SERPL-MCNC: 32 NG/ML (ref 30–100)
ALBUMIN SERPL BCP-MCNC: 4.6 G/DL (ref 3.2–4.9)
ALBUMIN/GLOB SERPL: 1.5 G/DL
ALP SERPL-CCNC: 72 U/L (ref 30–99)
ALT SERPL-CCNC: 107 U/L (ref 2–50)
ANION GAP SERPL CALC-SCNC: 13 MMOL/L (ref 7–16)
AST SERPL-CCNC: 51 U/L (ref 12–45)
BILIRUB SERPL-MCNC: 0.5 MG/DL (ref 0.1–1.5)
BUN SERPL-MCNC: 13 MG/DL (ref 8–22)
CALCIUM SERPL-MCNC: 9.5 MG/DL (ref 8.5–10.5)
CHLORIDE SERPL-SCNC: 105 MMOL/L (ref 96–112)
CO2 SERPL-SCNC: 23 MMOL/L (ref 20–33)
CREAT SERPL-MCNC: 1.53 MG/DL (ref 0.5–1.4)
FERRITIN SERPL-MCNC: 33.3 NG/ML (ref 22–322)
GLOBULIN SER CALC-MCNC: 3 G/DL (ref 1.9–3.5)
GLUCOSE SERPL-MCNC: 105 MG/DL (ref 65–99)
HCT VFR BLD AUTO: 54.3 % (ref 42–52)
HGB BLD-MCNC: 17.6 G/DL (ref 14–18)
POTASSIUM SERPL-SCNC: 4.9 MMOL/L (ref 3.6–5.5)
PROT SERPL-MCNC: 7.6 G/DL (ref 6–8.2)
PTH-INTACT SERPL-MCNC: 80.5 PG/ML (ref 14–72)
SODIUM SERPL-SCNC: 141 MMOL/L (ref 135–145)
VIT B12 SERPL-MCNC: 730 PG/ML (ref 211–911)

## 2022-01-05 PROCEDURE — 82306 VITAMIN D 25 HYDROXY: CPT

## 2022-01-05 PROCEDURE — 84402 ASSAY OF FREE TESTOSTERONE: CPT

## 2022-01-05 PROCEDURE — 85014 HEMATOCRIT: CPT

## 2022-01-05 PROCEDURE — 80053 COMPREHEN METABOLIC PANEL: CPT

## 2022-01-05 PROCEDURE — 99195 PHLEBOTOMY: CPT

## 2022-01-05 PROCEDURE — 85018 HEMOGLOBIN: CPT

## 2022-01-05 PROCEDURE — 83970 ASSAY OF PARATHORMONE: CPT

## 2022-01-05 PROCEDURE — 84270 ASSAY OF SEX HORMONE GLOBUL: CPT

## 2022-01-05 PROCEDURE — 82607 VITAMIN B-12: CPT

## 2022-01-05 PROCEDURE — 82728 ASSAY OF FERRITIN: CPT

## 2022-01-05 PROCEDURE — 84403 ASSAY OF TOTAL TESTOSTERONE: CPT

## 2022-01-05 PROCEDURE — 36415 COLL VENOUS BLD VENIPUNCTURE: CPT

## 2022-01-05 ASSESSMENT — FIBROSIS 4 INDEX: FIB4 SCORE: 1.38

## 2022-01-06 NOTE — PROGRESS NOTES
Neil arrives for the therapeutic phlebotomy. 20g PIV started right AC. Standing order labs drawn as ordered and istat labs as ordered.  Neil's Hgb 18 , Hct 54%. 250 cc blood removed. Neil tolerated well. Orthostatic vitals stable, see flowsheet. Neil denies chest pain, shortness of breath, dizziness, or lightheadedness after treatment. Message left to schedulers to schedule next appointment. Discharged to self care; no apparent distress noted.

## 2022-01-07 ENCOUNTER — OFFICE VISIT (OUTPATIENT)
Dept: ENDOCRINOLOGY | Facility: MEDICAL CENTER | Age: 52
End: 2022-01-07
Attending: INTERNAL MEDICINE
Payer: COMMERCIAL

## 2022-01-07 VITALS
HEART RATE: 72 BPM | WEIGHT: 202 LBS | HEIGHT: 69 IN | BODY MASS INDEX: 29.92 KG/M2 | DIASTOLIC BLOOD PRESSURE: 80 MMHG | SYSTOLIC BLOOD PRESSURE: 118 MMHG

## 2022-01-07 DIAGNOSIS — E55.9 VITAMIN D DEFICIENCY: ICD-10-CM

## 2022-01-07 DIAGNOSIS — N25.81 SECONDARY HYPERPARATHYROIDISM (HCC): ICD-10-CM

## 2022-01-07 DIAGNOSIS — E53.8 B12 DEFICIENCY: ICD-10-CM

## 2022-01-07 DIAGNOSIS — N18.31 STAGE 3A CHRONIC KIDNEY DISEASE: ICD-10-CM

## 2022-01-07 DIAGNOSIS — E29.1 MALE HYPOGONADISM: ICD-10-CM

## 2022-01-07 DIAGNOSIS — Z79.899 HIGH RISK MEDICATION USE: ICD-10-CM

## 2022-01-07 LAB
SHBG SERPL-SCNC: 13 NMOL/L (ref 11–80)
TESTOST FREE MFR SERPL: 2.8 % (ref 1.6–2.9)
TESTOST FREE SERPL-MCNC: 183 PG/ML (ref 47–244)
TESTOST SERPL-MCNC: 655 NG/DL (ref 300–890)

## 2022-01-07 PROCEDURE — 99212 OFFICE O/P EST SF 10 MIN: CPT | Performed by: INTERNAL MEDICINE

## 2022-01-07 PROCEDURE — 99214 OFFICE O/P EST MOD 30 MIN: CPT | Performed by: INTERNAL MEDICINE

## 2022-01-07 RX ORDER — TESTOSTERONE CYPIONATE 200 MG/ML
100 INJECTION, SOLUTION INTRAMUSCULAR
COMMUNITY
End: 2022-01-07 | Stop reason: SDUPTHER

## 2022-01-07 RX ORDER — TESTOSTERONE CYPIONATE 200 MG/ML
126 INJECTION, SOLUTION INTRAMUSCULAR
Qty: 12 ML | Refills: 1 | Status: SHIPPED | OUTPATIENT
Start: 2022-01-07 | End: 2022-11-28

## 2022-01-07 ASSESSMENT — FIBROSIS 4 INDEX: FIB4 SCORE: 1.15

## 2022-01-07 ASSESSMENT — PATIENT HEALTH QUESTIONNAIRE - PHQ9: CLINICAL INTERPRETATION OF PHQ2 SCORE: 0

## 2022-01-07 NOTE — PROGRESS NOTES
Chief Complaint: Follow up for Secondary Hypogonadism     HPI:     Neil Abdi Jr. is a 50 y.o. male here for follow up of Hypogonadism         Since last visit patient reports feeling excellent.    He remains on Testosterone cypionate 125 mg every 7 days which has been his dose for the past 12 months.   He reports excellent compliance and denies missing any weekly or biweekly doses.       He currently denies fatigue,depression, loss of libido, erectile dysfunction.    He currently denies decreased stream, hesitancy, intermittency and post void dribbling.       he just did labs yesterday and the results are pending     Last total testosterone was 643 with a calculated free testosterone of 20 hematocrit was 52% he gets regular phlebotomy every 8 to 12 weeks at [facility]    His vitamin D dropped from 70 down to 30 and his PTH levels dayron to 80 on January 5, 2022 and we discussed that this is from vitamin D deficiency he admits that he has not been taking his vitamin D regularly    His ferritin is fair at 30    B12 is 730    He does have new chronic kidney disease since June 2021    Serum creatinine was 1.4 and is now 1.53 on January 2022  Estimated GFR is 48  He has no known risk factors  No new medications  He denies taking NSAIDs  He admits that he has been drinking more alcohol lately          Patient's medications, allergies, and social histories were reviewed and updated as appropriate.      ROS:       CONS:     No fever, no chills   EYES:     No diplopia, no blurry vision   CV:           No chest pain, no palpitations   PULM:     No SOB, no cough, no hemoptysis.   GI:            No nausea, no vomiting, no diarrhea, no constipation   ENDO:     No polyuria, no polydipsia, no heat intolerance, no cold intolerance       Past Medical History:  Problem List:  2021-02: Anxiety  2020-09: Hypogonadism in male  2018-03: Lump in neck  2017-03: Acne vulgaris  2017-03: Chronic gout involving toe of left foot without  "antonias  2017: Mixed hyperlipidemia  2017: Essential hypertension  2017: Gastroesophageal reflux disease without esophagitis  2017: Multiple allergies  2017: Low testosterone in male  2017: Exercise-induced asthma      Past Surgical History:  Past Surgical History:   Procedure Laterality Date   • LAMINOTOMY          Allergies:  Patient has no known allergies.     Social History:  Social History     Tobacco Use   • Smoking status: Former Smoker     Quit date: 2002     Years since quittin.4   • Smokeless tobacco: Former User   Substance Use Topics   • Alcohol use: Yes     Alcohol/week: 1.8 oz     Types: 1 Glasses of wine, 1 Cans of beer, 1 Shots of liquor per week     Comment: weekly   • Drug use: No        Family History:   family history includes Cancer in his father, maternal grandfather, mother, paternal grandfather, and paternal uncle; No Known Problems in his sister, sister, and sister.        PHYSICAL EXAM:   Vital signs: /80   Pulse 72   Ht 1.753 m (5' 9\")   Wt 91.6 kg (202 lb)   BMI 29.83 kg/m²   GENERAL: Well-developed, well-nourished in no apparent distress.   EYE:  No ocular asymmetry, PERRLA  HENT: Pink, moist mucous membranes.     CHEST: no gynecomastia  CARDIOVASCULAR:  No murmurs  LUNGS: Clear breath sounds  ABDOMEN: Soft, nontender   GENIT: deferred  EXTREMITIES: No clubbing, cyanosis, or edema.   NEUROLOGICAL: No gross focal motor abnormalities. No visible tremor, no proximal muscle weakness   LYMPH: No cervical adenopathy palpated.   SKIN: No rashes, lesions.       Labs:  Lab Results   Component Value Date/Time    WBC 7.7 2020 09:20 AM    RBC 6.14 (H) 2020 09:20 AM    HEMOGLOBIN 17.6 2022 04:10 PM    MCV 86.6 2020 09:20 AM    MCH 28.7 2020 09:20 AM    MCHC 33.1 (L) 2020 09:20 AM    RDW 43.7 2020 09:20 AM    MPV 10.7 2020 09:20 AM       Lab Results   Component Value Date/Time    SODIUM 141 2022 04:10 PM    " POTASSIUM 4.9 01/05/2022 04:10 PM    CHLORIDE 105 01/05/2022 04:10 PM    CO2 23 01/05/2022 04:10 PM    ANION 13.0 01/05/2022 04:10 PM    GLUCOSE 105 (H) 01/05/2022 04:10 PM    BUN 13 01/05/2022 04:10 PM    CREATININE 1.53 (H) 01/05/2022 04:10 PM    CALCIUM 9.5 01/05/2022 04:10 PM    ASTSGOT 51 (H) 01/05/2022 04:10 PM    ALTSGPT 107 (H) 01/05/2022 04:10 PM    TBILIRUBIN 0.5 01/05/2022 04:10 PM    ALBUMIN 4.6 01/05/2022 04:10 PM    TOTPROTEIN 7.6 01/05/2022 04:10 PM    GLOBULIN 3.0 01/05/2022 04:10 PM    AGRATIO 1.5 01/05/2022 04:10 PM       No results found for: LH    Lab Results   Component Value Date/Time    FSH 0.5 (L) 03/07/2018 0628       Lab Results   Component Value Date/Time    TESTOSTERONE 643 06/02/2021 1630           Imaging:    ASSESSMENT/PLAN:      1. Male hypogonadism  Controlled  Continue same testosterone dose  Continue phlebotomy  I reordered his phlebotomy  Follow-up in 6 months with repeat of testosterone and hematocrit levels    2. Vitamin D deficiency  Uncontrolled  Restart vitamin D3 5000 units daily  Repeat calcium and vitamin D levels in 3 to 6 months    3. Secondary hyperparathyroidism (HCC)  Unstable  PTH levels are high because of low vitamin D  Expect PTH levels improved with adequate vitamin D replacement  And maintenance of adequate vitamin D levels  Repeat calcium PTH and vitamin D levels in 3 to 6 months    4. B12 deficiency  Controlled we will check B12 levels in 3 to 6 months    5. Stage 3a chronic kidney disease (HCC)  Repeat serum creatinine in 3 to 6 months with urinalysis, microscopy and urine protein creatinine ratio    6. High risk medication use  Patient is taking testosterone which is a high risk medication      Return in about 6 months (around 7/7/2022).      Thank you kindly for allowing me to participate in the endocrine care plan for this patient.    Shen Boyle MD, FACE, [facility]  06/18/21    CC:   Oswald Stinson, A.P.R.N.

## 2022-03-30 ENCOUNTER — OUTPATIENT INFUSION SERVICES (OUTPATIENT)
Dept: ONCOLOGY | Facility: MEDICAL CENTER | Age: 52
End: 2022-03-30
Attending: INTERNAL MEDICINE
Payer: COMMERCIAL

## 2022-03-30 VITALS
RESPIRATION RATE: 16 BRPM | DIASTOLIC BLOOD PRESSURE: 90 MMHG | BODY MASS INDEX: 28.55 KG/M2 | OXYGEN SATURATION: 96 % | WEIGHT: 203.93 LBS | HEART RATE: 99 BPM | SYSTOLIC BLOOD PRESSURE: 127 MMHG | TEMPERATURE: 98.1 F | HEIGHT: 71 IN

## 2022-03-30 DIAGNOSIS — N18.31 STAGE 3A CHRONIC KIDNEY DISEASE: ICD-10-CM

## 2022-03-30 DIAGNOSIS — E55.9 VITAMIN D DEFICIENCY: ICD-10-CM

## 2022-03-30 DIAGNOSIS — Z79.899 HIGH RISK MEDICATION USE: ICD-10-CM

## 2022-03-30 DIAGNOSIS — E53.8 B12 DEFICIENCY: ICD-10-CM

## 2022-03-30 DIAGNOSIS — N25.81 SECONDARY HYPERPARATHYROIDISM (HCC): ICD-10-CM

## 2022-03-30 DIAGNOSIS — E29.1 MALE HYPOGONADISM: ICD-10-CM

## 2022-03-30 DIAGNOSIS — D75.1 POLYCYTHEMIA: ICD-10-CM

## 2022-03-30 LAB
25(OH)D3 SERPL-MCNC: 53 NG/ML (ref 30–100)
ALBUMIN SERPL BCP-MCNC: 5.2 G/DL (ref 3.2–4.9)
ALBUMIN/GLOB SERPL: 1.9 G/DL
ALP SERPL-CCNC: 73 U/L (ref 30–99)
ALT SERPL-CCNC: 130 U/L (ref 2–50)
ANION GAP SERPL CALC-SCNC: 13 MMOL/L (ref 7–16)
APPEARANCE UR: CLEAR
AST SERPL-CCNC: 65 U/L (ref 12–45)
BILIRUB SERPL-MCNC: 0.5 MG/DL (ref 0.1–1.5)
BILIRUB UR QL STRIP.AUTO: NEGATIVE
BUN SERPL-MCNC: 16 MG/DL (ref 8–22)
CALCIUM SERPL-MCNC: 9.5 MG/DL (ref 8.5–10.5)
CHLORIDE SERPL-SCNC: 104 MMOL/L (ref 96–112)
CO2 SERPL-SCNC: 25 MMOL/L (ref 20–33)
COLOR UR: YELLOW
CREAT SERPL-MCNC: 1.18 MG/DL (ref 0.5–1.4)
CREAT UR-MCNC: 198.72 MG/DL
GFR SERPLBLD CREATININE-BSD FMLA CKD-EPI: 74 ML/MIN/1.73 M 2
GLOBULIN SER CALC-MCNC: 2.8 G/DL (ref 1.9–3.5)
GLUCOSE SERPL-MCNC: 110 MG/DL (ref 65–99)
GLUCOSE UR STRIP.AUTO-MCNC: NEGATIVE MG/DL
HCT VFR BLD CALC: 53 % (ref 42–52)
HGB BLD-MCNC: 18 G/DL (ref 14–18)
KETONES UR STRIP.AUTO-MCNC: NEGATIVE MG/DL
LEUKOCYTE ESTERASE UR QL STRIP.AUTO: NEGATIVE
MICRO URNS: NORMAL
NITRITE UR QL STRIP.AUTO: NEGATIVE
PH UR STRIP.AUTO: 5 [PH] (ref 5–8)
POTASSIUM SERPL-SCNC: 4.4 MMOL/L (ref 3.6–5.5)
PROT SERPL-MCNC: 8 G/DL (ref 6–8.2)
PROT UR QL STRIP: NEGATIVE MG/DL
PROT UR-MCNC: 13 MG/DL (ref 0–15)
PROT/CREAT UR: 65 MG/G (ref 15–68)
PTH-INTACT SERPL-MCNC: 58.5 PG/ML (ref 14–72)
RBC UR QL AUTO: NEGATIVE
SODIUM SERPL-SCNC: 142 MMOL/L (ref 135–145)
SP GR UR STRIP.AUTO: 1.03
TSH SERPL DL<=0.005 MIU/L-ACNC: 1.78 UIU/ML (ref 0.38–5.33)
UROBILINOGEN UR STRIP.AUTO-MCNC: 0.2 MG/DL
VIT B12 SERPL-MCNC: 3875 PG/ML (ref 211–911)

## 2022-03-30 PROCEDURE — 84403 ASSAY OF TOTAL TESTOSTERONE: CPT

## 2022-03-30 PROCEDURE — 85014 HEMATOCRIT: CPT

## 2022-03-30 PROCEDURE — 82607 VITAMIN B-12: CPT

## 2022-03-30 PROCEDURE — 99195 PHLEBOTOMY: CPT

## 2022-03-30 PROCEDURE — 84270 ASSAY OF SEX HORMONE GLOBUL: CPT

## 2022-03-30 PROCEDURE — 36415 COLL VENOUS BLD VENIPUNCTURE: CPT

## 2022-03-30 PROCEDURE — 80053 COMPREHEN METABOLIC PANEL: CPT

## 2022-03-30 PROCEDURE — 82306 VITAMIN D 25 HYDROXY: CPT

## 2022-03-30 PROCEDURE — 82570 ASSAY OF URINE CREATININE: CPT

## 2022-03-30 PROCEDURE — 83970 ASSAY OF PARATHORMONE: CPT

## 2022-03-30 PROCEDURE — 84402 ASSAY OF FREE TESTOSTERONE: CPT

## 2022-03-30 PROCEDURE — 84443 ASSAY THYROID STIM HORMONE: CPT

## 2022-03-30 PROCEDURE — 84156 ASSAY OF PROTEIN URINE: CPT

## 2022-03-30 PROCEDURE — 81003 URINALYSIS AUTO W/O SCOPE: CPT

## 2022-03-30 ASSESSMENT — FIBROSIS 4 INDEX: FIB4 SCORE: 1.15

## 2022-03-30 NOTE — PROGRESS NOTES
Pt presented to [facility] for therapeutic phlebotomy. Established PIV in LAC; brisk blood return observed and pt tolerated well. Istat H+H labs drawn per orders and were within treatable parameters. 500 mLs of whole blood removed and pt tolerated well. Orthostatic vitals stable. PIV flushed with brisk blood return observed and removed; gauze/coband dressing applied. Next appointment confirmedand education provided. Pt discharged to self care with all personal belongings and in NAD.

## 2022-03-31 DIAGNOSIS — E55.9 VITAMIN D DEFICIENCY: ICD-10-CM

## 2022-03-31 DIAGNOSIS — E53.8 B12 DEFICIENCY: ICD-10-CM

## 2022-03-31 DIAGNOSIS — E29.1 MALE HYPOGONADISM: ICD-10-CM

## 2022-03-31 NOTE — PROGRESS NOTES
Called patient and discussed labs renal function is improved  Advised him to reduce b12 supplements    Vitamin D is improved - PTH is back to normal     Will repeat CMP and testosterone prior to next visit

## 2022-04-03 LAB
SHBG SERPL-SCNC: 13 NMOL/L (ref 19–76)
TESTOST FREE MFR SERPL: 2.8 % (ref 1.6–2.9)
TESTOST FREE SERPL-MCNC: 191 PG/ML (ref 47–244)
TESTOST SERPL-MCNC: 683 NG/DL (ref 300–890)

## 2022-04-26 DIAGNOSIS — I10 ESSENTIAL HYPERTENSION: ICD-10-CM

## 2022-04-26 DIAGNOSIS — M1A.9XX0 CHRONIC GOUT INVOLVING TOE OF LEFT FOOT WITHOUT TOPHUS, UNSPECIFIED CAUSE: ICD-10-CM

## 2022-04-26 RX ORDER — ALLOPURINOL 300 MG/1
300 TABLET ORAL
Qty: 30 TABLET | Refills: 0 | Status: SHIPPED | OUTPATIENT
Start: 2022-04-26 | End: 2022-07-28

## 2022-04-26 RX ORDER — LISINOPRIL 40 MG/1
40 TABLET ORAL
Qty: 30 TABLET | Refills: 0 | Status: SHIPPED | OUTPATIENT
Start: 2022-04-26 | End: 2022-07-28

## 2022-05-19 DIAGNOSIS — K21.9 GASTROESOPHAGEAL REFLUX DISEASE WITHOUT ESOPHAGITIS: ICD-10-CM

## 2022-05-19 RX ORDER — OMEPRAZOLE 20 MG/1
20 CAPSULE, DELAYED RELEASE ORAL
Qty: 90 CAPSULE | Refills: 3 | Status: SHIPPED | OUTPATIENT
Start: 2022-05-19 | End: 2022-07-28 | Stop reason: SDUPTHER

## 2022-05-25 ENCOUNTER — OFFICE VISIT (OUTPATIENT)
Dept: URGENT CARE | Facility: CLINIC | Age: 52
End: 2022-05-25
Payer: COMMERCIAL

## 2022-05-25 VITALS
WEIGHT: 200 LBS | HEART RATE: 90 BPM | DIASTOLIC BLOOD PRESSURE: 84 MMHG | TEMPERATURE: 98.4 F | RESPIRATION RATE: 16 BRPM | SYSTOLIC BLOOD PRESSURE: 130 MMHG | OXYGEN SATURATION: 98 % | HEIGHT: 70 IN | BODY MASS INDEX: 28.63 KG/M2

## 2022-05-25 DIAGNOSIS — R05.9 COUGH: ICD-10-CM

## 2022-05-25 DIAGNOSIS — J45.21 MILD INTERMITTENT ASTHMA WITH EXACERBATION: ICD-10-CM

## 2022-05-25 DIAGNOSIS — U07.1 COVID-19: ICD-10-CM

## 2022-05-25 LAB
EXTERNAL QUALITY CONTROL: ABNORMAL
FLUAV+FLUBV AG SPEC QL IA: NEGATIVE
INT CON NEG: NORMAL
INT CON POS: NORMAL
SARS-COV+SARS-COV-2 AG RESP QL IA.RAPID: POSITIVE

## 2022-05-25 PROCEDURE — 87804 INFLUENZA ASSAY W/OPTIC: CPT | Performed by: PHYSICIAN ASSISTANT

## 2022-05-25 PROCEDURE — 87426 SARSCOV CORONAVIRUS AG IA: CPT | Performed by: PHYSICIAN ASSISTANT

## 2022-05-25 PROCEDURE — 99214 OFFICE O/P EST MOD 30 MIN: CPT | Mod: CS | Performed by: PHYSICIAN ASSISTANT

## 2022-05-25 RX ORDER — METHYLPREDNISOLONE 4 MG/1
TABLET ORAL
Qty: 1 EACH | Refills: 0 | Status: SHIPPED | OUTPATIENT
Start: 2022-05-25 | End: 2022-07-28

## 2022-05-25 RX ORDER — DEXTROMETHORPHAN HYDROBROMIDE AND PROMETHAZINE HYDROCHLORIDE 15; 6.25 MG/5ML; MG/5ML
5 SYRUP ORAL EVERY 4 HOURS PRN
Qty: 120 ML | Refills: 0 | Status: SHIPPED | OUTPATIENT
Start: 2022-05-25 | End: 2022-07-28

## 2022-05-25 RX ORDER — BENZONATATE 100 MG/1
100 CAPSULE ORAL 3 TIMES DAILY PRN
Qty: 60 CAPSULE | Refills: 0 | Status: SHIPPED | OUTPATIENT
Start: 2022-05-25 | End: 2022-07-28

## 2022-05-25 ASSESSMENT — ENCOUNTER SYMPTOMS
SPUTUM PRODUCTION: 0
FEVER: 1
VOMITING: 0
MYALGIAS: 1
NAUSEA: 0
ABDOMINAL PAIN: 0
SORE THROAT: 0
SHORTNESS OF BREATH: 0
DIARRHEA: 0
CHILLS: 1
COUGH: 1
WHEEZING: 1

## 2022-05-25 ASSESSMENT — FIBROSIS 4 INDEX: FIB4 SCORE: 1.34

## 2022-05-25 NOTE — PROGRESS NOTES
"Subjective:   Neil Abdi Jr.  is a 51 y.o. male who presents for Fever (X 3 days, fever, chills, body aches and today cough)      Fever   This is a new problem. The current episode started in the past 7 days. Associated symptoms include congestion, coughing and wheezing. Pertinent negatives include no abdominal pain, diarrhea, ear pain, nausea, rash, sore throat or vomiting.   Patient resents urgent care noting last 3 days of symptoms of fevers chills and body aches.  Notes mild cough onset today.  Notes minimal congestion with some minimal postnasal drainage.  Denies ear pain.  Denies nausea vomiting or abdominal pain notes mild diarrhea today.  He feels the congestion moving from upper to lower respiratory.  He notes history of pneumonia most recently around 2 years ago with suspected COVID at the time.  He does have a history of asthma and uses inhalers and Singulair but again has felt little need with minimal coughing.  He denies loss of taste or smell.  Patient denies history of diagnosed COVID, denies history of COVID-vaccine.  Has used over-the-counter multisymptom cough and cold meds for treatment thus far.  He reports a max temp of over 101.    Review of Systems   Constitutional: Positive for chills and fever.   HENT: Positive for congestion. Negative for ear pain and sore throat.    Respiratory: Positive for cough and wheezing. Negative for sputum production and shortness of breath.    Gastrointestinal: Negative for abdominal pain, diarrhea, nausea and vomiting.   Musculoskeletal: Positive for myalgias.   Skin: Negative for rash.       No Known Allergies     Objective:   /84 (BP Location: Left arm, Patient Position: Sitting, BP Cuff Size: Large adult)   Pulse 90   Temp 36.9 °C (98.4 °F) (Temporal)   Resp 16   Ht 1.778 m (5' 10\")   Wt 90.7 kg (200 lb)   SpO2 98%   BMI 28.70 kg/m²     Physical Exam  Vitals and nursing note reviewed.   Constitutional:       General: He is not in acute " distress.     Appearance: He is well-developed. He is not diaphoretic.   HENT:      Head: Normocephalic and atraumatic.      Right Ear: Tympanic membrane, ear canal and external ear normal.      Left Ear: Tympanic membrane, ear canal and external ear normal.      Nose: Nose normal.      Mouth/Throat:      Pharynx: Uvula midline. Posterior oropharyngeal erythema ( mild PND) present. No oropharyngeal exudate.      Tonsils: No tonsillar abscesses.   Eyes:      General: No scleral icterus.        Right eye: No discharge.         Left eye: No discharge.      Conjunctiva/sclera: Conjunctivae normal.   Pulmonary:      Effort: Pulmonary effort is normal. No respiratory distress.      Breath sounds: No decreased breath sounds, wheezing, rhonchi or rales.      Comments: Lung sounds clear, spastic wheezy cough  Musculoskeletal:         General: Normal range of motion.      Cervical back: Neck supple.   Lymphadenopathy:      Cervical: Cervical adenopathy ( mild bilat) present.   Skin:     General: Skin is warm and dry.      Coloration: Skin is not pale.   Neurological:      Mental Status: He is alert and oriented to person, place, and time.      Coordination: Coordination normal.       POCT FLU - NEG  POCT COVID - POS     Assessment/Plan:   1. COVID-19    2. Cough  - POCT Influenza A/B  - POCT SARS-COV Antigen JUSTINE (Symptomatic only)  - promethazine-dextromethorphan (PROMETHAZINE-DM) 6.25-15 MG/5ML syrup; Take 5 mL by mouth every four hours as needed for Cough.  Dispense: 120 mL; Refill: 0  - benzonatate (TESSALON) 100 MG Cap; Take 1 Capsule by mouth 3 times a day as needed for Cough.  Dispense: 60 Capsule; Refill: 0    3. Mild intermittent asthma with exacerbation  - methylPREDNISolone (MEDROL DOSEPAK) 4 MG Tablet Therapy Pack; Take as directed on package. Dispense one package.  Dispense: 1 Each; Refill: 0  Supportive care is reviewed with patient/caregiver - recommend to push PO fluids and electrolytes, over-the-counter  symptom support medications reviewed, ER precautions with worsened symptoms, quarantine recommendations reviewed, sent with letter, Aneta for results of testing  Cautioned regarding potential for sedation with medication.  Cautioned regarding potential side effects of steroid, avoid nsaids while using  Return to clinic with lack of resolution or progression of symptoms.  ER precautions with any worsening symptoms are reviewed with patient/caregiver and they do express understanding       I have worn an N95 mask, gloves and eye protection for the entire encounter with this patient.     Differential diagnosis, natural history, supportive care, and indications for immediate follow-up discussed.

## 2022-05-25 NOTE — LETTER
May 25, 2022       Patient: Neil Abdi Jr.   YOB: 1970   Date of Visit: 5/25/2022         To Whom It May Concern:    In my medical opinion, I recommend that Neil Abdi should be excused from work for [facility] quarantine timeframe due to testing positive for COVID-19 today, 5/25/2022.      If you have any questions or concerns, please don't hesitate to call [phone]          Sincerely,          Artie Vazquez P.A.-C.  Electronically Signed

## 2022-05-25 NOTE — LETTER
May 25, 2022       Patient: Neil Abdi Jr.   YOB: 1970   Date of Visit: 5/25/2022         To Whom It May Concern:    In my medical opinion, I recommend that Neil Abdi should be excused from work per CDC quarantine recommendations.  Patient has tested positive for COVID-19 today, 5/25/2022.      If you have any questions or concerns, please don't hesitate to call [phone]          Sincerely,          Artie Vazquez P.A.-C.  Electronically Signed

## 2022-05-28 DIAGNOSIS — M1A.9XX0 CHRONIC GOUT INVOLVING TOE OF LEFT FOOT WITHOUT TOPHUS, UNSPECIFIED CAUSE: ICD-10-CM

## 2022-05-28 DIAGNOSIS — I10 ESSENTIAL HYPERTENSION: ICD-10-CM

## 2022-05-31 RX ORDER — ALLOPURINOL 300 MG/1
300 TABLET ORAL
Qty: 30 TABLET | Refills: 0 | OUTPATIENT
Start: 2022-05-31

## 2022-05-31 RX ORDER — LISINOPRIL 40 MG/1
40 TABLET ORAL
Qty: 30 TABLET | Refills: 0 | OUTPATIENT
Start: 2022-05-31

## 2022-06-29 ENCOUNTER — OUTPATIENT INFUSION SERVICES (OUTPATIENT)
Dept: ONCOLOGY | Facility: MEDICAL CENTER | Age: 52
End: 2022-06-29
Attending: INTERNAL MEDICINE
Payer: COMMERCIAL

## 2022-06-29 VITALS
TEMPERATURE: 97.5 F | DIASTOLIC BLOOD PRESSURE: 98 MMHG | RESPIRATION RATE: 18 BRPM | HEIGHT: 69 IN | WEIGHT: 201.5 LBS | HEART RATE: 83 BPM | SYSTOLIC BLOOD PRESSURE: 145 MMHG | BODY MASS INDEX: 29.84 KG/M2 | OXYGEN SATURATION: 98 %

## 2022-06-29 DIAGNOSIS — E29.1 MALE HYPOGONADISM: Primary | ICD-10-CM

## 2022-06-29 DIAGNOSIS — E55.9 VITAMIN D DEFICIENCY: ICD-10-CM

## 2022-06-29 DIAGNOSIS — E53.8 B12 DEFICIENCY: ICD-10-CM

## 2022-06-29 LAB
25(OH)D3 SERPL-MCNC: 62 NG/ML (ref 30–100)
ALBUMIN SERPL BCP-MCNC: 4.7 G/DL (ref 3.2–4.9)
ALBUMIN/GLOB SERPL: 1.6 G/DL
ALP SERPL-CCNC: 69 U/L (ref 30–99)
ALT SERPL-CCNC: 100 U/L (ref 2–50)
ANION GAP SERPL CALC-SCNC: 15 MMOL/L (ref 7–16)
AST SERPL-CCNC: 59 U/L (ref 12–45)
BILIRUB SERPL-MCNC: 0.5 MG/DL (ref 0.1–1.5)
BUN SERPL-MCNC: 14 MG/DL (ref 8–22)
CALCIUM SERPL-MCNC: 9.2 MG/DL (ref 8.5–10.5)
CHLORIDE SERPL-SCNC: 104 MMOL/L (ref 96–112)
CO2 SERPL-SCNC: 22 MMOL/L (ref 20–33)
CREAT SERPL-MCNC: 1.1 MG/DL (ref 0.5–1.4)
GFR SERPLBLD CREATININE-BSD FMLA CKD-EPI: 80 ML/MIN/1.73 M 2
GLOBULIN SER CALC-MCNC: 2.9 G/DL (ref 1.9–3.5)
GLUCOSE SERPL-MCNC: 111 MG/DL (ref 65–99)
HCT VFR BLD AUTO: 49.6 % (ref 42–52)
HCT VFR BLD CALC: 49 % (ref 42–52)
HGB BLD-MCNC: 16.4 G/DL (ref 14–18)
HGB BLD-MCNC: 16.7 G/DL (ref 14–18)
POTASSIUM SERPL-SCNC: 4.2 MMOL/L (ref 3.6–5.5)
PROT SERPL-MCNC: 7.6 G/DL (ref 6–8.2)
SODIUM SERPL-SCNC: 141 MMOL/L (ref 135–145)
VIT B12 SERPL-MCNC: 1033 PG/ML (ref 211–911)

## 2022-06-29 PROCEDURE — 85014 HEMATOCRIT: CPT | Mod: 91

## 2022-06-29 PROCEDURE — 84403 ASSAY OF TOTAL TESTOSTERONE: CPT

## 2022-06-29 PROCEDURE — 36415 COLL VENOUS BLD VENIPUNCTURE: CPT

## 2022-06-29 PROCEDURE — 85018 HEMOGLOBIN: CPT

## 2022-06-29 PROCEDURE — 84402 ASSAY OF FREE TESTOSTERONE: CPT

## 2022-06-29 PROCEDURE — 84270 ASSAY OF SEX HORMONE GLOBUL: CPT

## 2022-06-29 PROCEDURE — 82607 VITAMIN B-12: CPT

## 2022-06-29 PROCEDURE — 82306 VITAMIN D 25 HYDROXY: CPT

## 2022-06-29 PROCEDURE — 80053 COMPREHEN METABOLIC PANEL: CPT

## 2022-06-29 NOTE — PROGRESS NOTES
[facility] and [id number] therapeutic phlebotomy. PIV started to right AC, brisk blood return, labs drawn as ordered, including standing labs. Hgb 16.7 and patient does not meet parameters for TP. PIV removed, gauze and coban to site. Patient discharged to self care in no apparent distress. Scheduling emailed regarding next appointment.

## 2022-07-01 LAB
SHBG SERPL-SCNC: 14 NMOL/L (ref 19–76)
TESTOST FREE MFR SERPL: 2.8 % (ref 1.6–2.9)
TESTOST FREE SERPL-MCNC: 207 PG/ML (ref 47–244)
TESTOST SERPL-MCNC: 745 NG/DL (ref 300–890)

## 2022-07-05 DIAGNOSIS — E29.1 MALE HYPOGONADISM: ICD-10-CM

## 2022-07-08 ENCOUNTER — OFFICE VISIT (OUTPATIENT)
Dept: ENDOCRINOLOGY | Facility: MEDICAL CENTER | Age: 52
End: 2022-07-08
Attending: INTERNAL MEDICINE
Payer: COMMERCIAL

## 2022-07-08 VITALS
HEART RATE: 101 BPM | SYSTOLIC BLOOD PRESSURE: 136 MMHG | HEIGHT: 70 IN | OXYGEN SATURATION: 98 % | WEIGHT: 201 LBS | BODY MASS INDEX: 28.77 KG/M2 | DIASTOLIC BLOOD PRESSURE: 76 MMHG

## 2022-07-08 DIAGNOSIS — E29.1 MALE HYPOGONADISM: ICD-10-CM

## 2022-07-08 DIAGNOSIS — R79.89 ABNORMAL SERUM CREATININE LEVEL: ICD-10-CM

## 2022-07-08 DIAGNOSIS — E55.9 VITAMIN D DEFICIENCY: ICD-10-CM

## 2022-07-08 DIAGNOSIS — N25.81 SECONDARY HYPERPARATHYROIDISM (HCC): ICD-10-CM

## 2022-07-08 DIAGNOSIS — R79.89 LOW TESTOSTERONE IN MALE: ICD-10-CM

## 2022-07-08 DIAGNOSIS — Z79.899 HIGH RISK MEDICATION USE: ICD-10-CM

## 2022-07-08 DIAGNOSIS — E53.8 B12 DEFICIENCY: ICD-10-CM

## 2022-07-08 PROCEDURE — 99214 OFFICE O/P EST MOD 30 MIN: CPT | Performed by: INTERNAL MEDICINE

## 2022-07-08 PROCEDURE — 99211 OFF/OP EST MAY X REQ PHY/QHP: CPT | Performed by: INTERNAL MEDICINE

## 2022-07-08 RX ORDER — TESTOSTERONE CYPIONATE 200 MG/ML
INJECTION, SOLUTION INTRAMUSCULAR
COMMUNITY
Start: 2022-07-05 | End: 2022-09-01 | Stop reason: SDUPTHER

## 2022-07-08 NOTE — PROGRESS NOTES
Chief Complaint: Follow up for Secondary Hypogonadism     HPI:     Neil Abdi Jr. is a 51 y.o. male here for follow up of Hypogonadism         Since last visit patient reports feeling excellent.    He remains on Testosterone cypionate 125 mg every 7 days  which has been his dose for the past 18 months.     He reports excellent compliance and denies missing any weekly or biweekly doses.       He currently denies fatigue,depression, loss of libido, erectile dysfunction.    He currently denies decreased stream, hesitancy, intermittency and post void dribbling.          Last total testosterone was 745 with a calculated free testosterone of 24 hematocrit was 49%   He gets regular phlebotomy every 8 to 12 weeks at [facility]    His vitamin D improved to 62  PTH is normal   B12 is 1003    He does have new chronic kidney disease since June 2021    Serum creatinine improved to 1.1 on  6/2022  He also cut back on alcohol as such his liver enzymes are better   Estimated GFR is 80  He has no known risk factors  No new medications  He denies taking NSAIDs            Patient's medications, allergies, and social histories were reviewed and updated as appropriate.      ROS:       CONS:     No fever, no chills   EYES:     No diplopia, no blurry vision   CV:           No chest pain, no palpitations   PULM:     No SOB, no cough, no hemoptysis.   GI:            No nausea, no vomiting, no diarrhea, no constipation   ENDO:     No polyuria, no polydipsia, no heat intolerance, no cold intolerance       Past Medical History:  Problem List:  2022-01: Secondary hyperparathyroidism (HCC)  2022-01: Vitamin D deficiency  2022-01: High risk medication use  2021-02: Anxiety  2020-09: Male hypogonadism  2018-03: Lump in neck  2017-03: Acne vulgaris  2017-03: Chronic gout involving toe of left foot without tophus  2017-03: Mixed hyperlipidemia  2017-02: Essential hypertension  2017-02: Gastroesophageal reflux disease without  "esophagitis  2017: Multiple allergies  2017: Low testosterone in male  2017: Exercise-induced asthma      Past Surgical History:  Past Surgical History:   Procedure Laterality Date   • LAMINOTOMY          Allergies:  Patient has no known allergies.     Social History:  Social History     Tobacco Use   • Smoking status: Former Smoker     Quit date: 2002     Years since quittin.9   • Smokeless tobacco: Former User   Substance Use Topics   • Alcohol use: Not Currently     Alcohol/week: 1.8 oz     Types: 1 Glasses of wine, 1 Cans of beer, 1 Shots of liquor per week   • Drug use: No        Family History:   family history includes Cancer in his father, maternal grandfather, mother, paternal grandfather, and paternal uncle; No Known Problems in his sister, sister, and sister.        PHYSICAL EXAM:   Vital signs: /76 (BP Location: Left arm, Patient Position: Sitting, BP Cuff Size: Adult)   Pulse (!) 101   Ht 1.778 m (5' 10\") Comment: pt stated  Wt 91.2 kg (201 lb)   SpO2 98%   BMI 28.84 kg/m²   GENERAL: Well-developed, well-nourished in no apparent distress.   EYE:  No ocular asymmetry, PERRLA  HENT: Pink, moist mucous membranes.     CHEST: no gynecomastia  CARDIOVASCULAR:  No murmurs  LUNGS: Clear breath sounds  ABDOMEN: Soft, nontender   GENIT: deferred  EXTREMITIES: No clubbing, cyanosis, or edema.   NEUROLOGICAL: No gross focal motor abnormalities. No visible tremor, no proximal muscle weakness   LYMPH: No cervical adenopathy palpated.   SKIN: No rashes, lesions.       Labs:  Lab Results   Component Value Date/Time    WBC 7.7 2020 09:20 AM    RBC 6.14 (H) 2020 09:20 AM    HEMOGLOBIN 16.4 2022 04:25 PM    MCV 86.6 2020 09:20 AM    MCH 28.7 2020 09:20 AM    MCHC 33.1 (L) 2020 09:20 AM    RDW 43.7 2020 09:20 AM    MPV 10.7 2020 09:20 AM       Lab Results   Component Value Date/Time    SODIUM 141 2022 04:25 PM    POTASSIUM 4.2 2022 " 04:25 PM    CHLORIDE 104 06/29/2022 04:25 PM    CO2 22 06/29/2022 04:25 PM    ANION 15.0 06/29/2022 04:25 PM    GLUCOSE 111 (H) 06/29/2022 04:25 PM    BUN 14 06/29/2022 04:25 PM    CREATININE 1.10 06/29/2022 04:25 PM    CALCIUM 9.2 06/29/2022 04:25 PM    ASTSGOT 59 (H) 06/29/2022 04:25 PM    ALTSGPT 100 (H) 06/29/2022 04:25 PM    TBILIRUBIN 0.5 06/29/2022 04:25 PM    ALBUMIN 4.7 06/29/2022 04:25 PM    TOTPROTEIN 7.6 06/29/2022 04:25 PM    GLOBULIN 2.9 06/29/2022 04:25 PM    AGRATIO 1.6 06/29/2022 04:25 PM       No results found for: LH    Lab Results   Component Value Date/Time    FSH 0.5 (L) 03/07/2018 0628       Lab Results   Component Value Date/Time    TESTOSTERONE 643 06/02/2021 1630           Imaging:    ASSESSMENT/PLAN:      1. Male hypogonadism  Controlled  Okay to reduce testosterone 200 mg every 7 days  Continue phlebotomy  Controlled substance agreement form was signed today  Follow-up in 6 months with repeat of testosterone and hematocrit levels  Also try to check PSA with next labs    2. Vitamin D deficiency  Controlled   continue  vitamin D3 5000 units daily  Continue monitoring    3. Secondary hyperparathyroidism (HCC)  Resolved   PTH is normal on 3/2022 as vitamin D deficiency is resolved     4. B12 deficiency  Controlled   Continue B12 supplements  Continue monitoring    5. Stage 3a chronic kidney disease (HCC)  This is resolved  Serum creatinine has improved and GFR has improved  Continue adequate hydration    6. High risk medication use  Patient is taking testosterone which is a high risk medication      Return in about 6 months (around 1/8/2023).      Thank you kindly for allowing me to participate in the endocrine care plan for this patient.    Shen Boyle MD, FACE, [facility]  06/18/21    CC:   Oswald Stinson, A.P.R.SANYA.

## 2022-07-12 RX ORDER — NEEDLES, DISPOSABLE 25GX5/8"
NEEDLE, DISPOSABLE MISCELLANEOUS
Qty: 50 EACH | Refills: 0 | Status: SHIPPED | OUTPATIENT
Start: 2022-07-12

## 2022-07-28 ENCOUNTER — OFFICE VISIT (OUTPATIENT)
Dept: MEDICAL GROUP | Facility: PHYSICIAN GROUP | Age: 52
End: 2022-07-28
Payer: COMMERCIAL

## 2022-07-28 VITALS
SYSTOLIC BLOOD PRESSURE: 122 MMHG | OXYGEN SATURATION: 100 % | WEIGHT: 200 LBS | DIASTOLIC BLOOD PRESSURE: 82 MMHG | HEART RATE: 78 BPM | TEMPERATURE: 96.9 F | HEIGHT: 70 IN | BODY MASS INDEX: 28.63 KG/M2

## 2022-07-28 DIAGNOSIS — Z12.11 SCREENING FOR COLON CANCER: ICD-10-CM

## 2022-07-28 DIAGNOSIS — J45.990 EXERCISE-INDUCED ASTHMA: ICD-10-CM

## 2022-07-28 DIAGNOSIS — E78.2 MIXED HYPERLIPIDEMIA: ICD-10-CM

## 2022-07-28 DIAGNOSIS — Z00.00 WELLNESS EXAMINATION: ICD-10-CM

## 2022-07-28 DIAGNOSIS — K21.9 GASTROESOPHAGEAL REFLUX DISEASE WITHOUT ESOPHAGITIS: ICD-10-CM

## 2022-07-28 DIAGNOSIS — Z88.9 MULTIPLE ALLERGIES: ICD-10-CM

## 2022-07-28 DIAGNOSIS — I10 ESSENTIAL HYPERTENSION: ICD-10-CM

## 2022-07-28 DIAGNOSIS — E29.1 MALE HYPOGONADISM: ICD-10-CM

## 2022-07-28 PROCEDURE — 99214 OFFICE O/P EST MOD 30 MIN: CPT | Performed by: NURSE PRACTITIONER

## 2022-07-28 RX ORDER — MONTELUKAST SODIUM 10 MG/1
10 TABLET ORAL
Qty: 90 TABLET | Refills: 3 | Status: SHIPPED | OUTPATIENT
Start: 2022-07-28 | End: 2023-05-09

## 2022-07-28 RX ORDER — OMEPRAZOLE 20 MG/1
20 CAPSULE, DELAYED RELEASE ORAL
Qty: 90 CAPSULE | Refills: 3 | Status: SHIPPED | OUTPATIENT
Start: 2022-07-28

## 2022-07-28 ASSESSMENT — ENCOUNTER SYMPTOMS
COUGH: 0
CHILLS: 0
HEADACHES: 0
DEPRESSION: 0
SHORTNESS OF BREATH: 0
FEVER: 0
DIZZINESS: 0
NERVOUS/ANXIOUS: 0
HEARTBURN: 0
PALPITATIONS: 0
NAUSEA: 0

## 2022-07-29 NOTE — PROGRESS NOTES
"Subjective:     Chief Complaint   Patient presents with   • Referral Needed     Dr. guajardo   • Medication Refill       HPI:   Neil presents today with     Problem   Male Hypogonadism    Drive.Chronic in nature.  Stable.  Follows with Dr. Soriano regarding this issue.  Continues to do testosterone as he states that he is doing well and continues to follow recommendations.     Mixed Hyperlipidemia    Chronic in nature.  Stable.  Patient has not had a lipid panel recently as such one is ordered.     Essential Hypertension    Chronic in nature.  Blood pressure is 122/82.  Patient states that he has made some changes in diet and exercise are related to these changes he has been off of the lisinopril for several months.  Denies chest pain, palpitations, dizziness, blurry vision.     Gastroesophageal Reflux Disease Without Esophagitis    Chronic in nature.  Stable.  Patient understands risk of long-term use of omeprazole but states that it is working well and that he would like to continue the medication.     Exercise-Induced Asthma    Chronic in nature.  Stable.  Patient is following with allergy specialist regarding this issue.  Denies nighttime cough or waking.         Current Outpatient Medications Ordered in Epic   Medication Sig Dispense Refill   • omeprazole (PRILOSEC) 20 MG delayed-release capsule Take 1 Capsule by mouth every day. 90 Capsule 3   • montelukast (SINGULAIR) 10 MG Tab Take 1 Tablet by mouth every day. 90 Tablet 3   • BD DISP NEEDLES 22G X 1-1/2\" Misc INJECT 1 INTO THE SHOULDER, THIGH, OR BUTTOCKS EVERY 7 DAYS 50 Each 0   • testosterone cypionate (DEPO-TESTOSTERONE) 200 MG/ML Solution injection      • syringe (B-D SYRINGE LUER-MYCHAL 1CC) 1 ML Misc 1 mL every 7 days. 50 Each 0   • vardenafil (LEVITRA) 20 MG tablet Take 1 Tab by mouth as needed (erectile dysfunction). 10 Tab 3   • azelastine (ASTELIN) 137 MCG/SPRAY nasal spray [place] 2 Sprays in nose 2 Times a Day. Each Nostril  3   • albuterol 108 (90 " "Base) MCG/ACT Aero Soln inhalation aerosol Inhale 2 Puffs by mouth every 6 hours as needed for Shortness of Breath. 8.5 g 6     No current Epic-ordered facility-administered medications on file.       Health Maintenance: Completed    Review of Systems   Constitutional: Negative for chills, fever and malaise/fatigue.   Respiratory: Negative for cough and shortness of breath.    Cardiovascular: Negative for chest pain, palpitations and leg swelling.   Gastrointestinal: Negative for heartburn and nausea.   Genitourinary: Negative for dysuria.   Neurological: Negative for dizziness and headaches.   Psychiatric/Behavioral: Negative for depression and suicidal ideas. The patient is not nervous/anxious.          Objective:     Exam:  /82 (BP Location: Left arm, Patient Position: Sitting, BP Cuff Size: Adult)   Pulse 78   Temp 36.1 °C (96.9 °F) (Temporal)   Ht 1.778 m (5' 10\")   Wt 90.7 kg (200 lb)   SpO2 100%   BMI 28.70 kg/m²  Body mass index is 28.7 kg/m².    Physical Exam  Constitutional:       Appearance: Normal appearance.   HENT:      Head: Normocephalic and atraumatic.   Eyes:      Pupils: Pupils are equal, round, and reactive to light.   Cardiovascular:      Rate and Rhythm: Normal rate and regular rhythm.      Heart sounds: Normal heart sounds.   Pulmonary:      Effort: Pulmonary effort is normal.      Breath sounds: Normal breath sounds.   Abdominal:      General: Abdomen is flat.   Skin:     General: Skin is warm and dry.      Capillary Refill: Capillary refill takes less than 2 seconds.   Neurological:      General: No focal deficit present.      Mental Status: He is alert and oriented to person, place, and time.       Assessment & Plan:     51 y.o. male with the following -     Problem List Items Addressed This Visit     Essential hypertension     - Discontinue lisinopril           Exercise-induced asthma     - Continue inhalers, montelukast as prescribed.           Relevant Medications    " montelukast (SINGULAIR) 10 MG Tab    Gastroesophageal reflux disease without esophagitis     - Continue omeprazole 20 mg p.o. daily.           Relevant Medications    omeprazole (PRILOSEC) 20 MG delayed-release capsule    Male hypogonadism     - Continue follow-up with Dr. Soriano.  Referral placed.           Relevant Orders    Referral to Endocrinology    Mixed hyperlipidemia     - Complete lipid panel.           Multiple allergies    Relevant Medications    montelukast (SINGULAIR) 10 MG Tab      Other Visit Diagnoses     Screening for colon cancer        Relevant Orders    COLOGUARD (FIT DNA)    Wellness examination        Relevant Orders    Lipid Profile              Return in about 1 year (around 7/28/2023), or if symptoms worsen or fail to improve.    I have placed the below orders and discussed them with an approved delegating provider. The MA is performing the below orders under the direction of Dr. Sotomayor.     Please note that this dictation was created using voice recognition software. I have made every reasonable attempt to correct obvious errors, but I expect that there are errors of grammar and possibly content that I did not discover before finalizing the note.

## 2022-08-30 DIAGNOSIS — E29.1 MALE HYPOGONADISM: ICD-10-CM

## 2022-08-30 RX ORDER — NEEDLES, DISPOSABLE 25GX5/8"
1 NEEDLE, DISPOSABLE MISCELLANEOUS
Qty: 50 EACH | Refills: 1 | Status: SHIPPED | OUTPATIENT
Start: 2022-08-30 | End: 2022-09-01 | Stop reason: SDUPTHER

## 2022-09-01 DIAGNOSIS — E29.1 HYPOGONADISM IN MALE: ICD-10-CM

## 2022-09-01 DIAGNOSIS — E29.1 MALE HYPOGONADISM: ICD-10-CM

## 2022-09-01 RX ORDER — NEEDLES, DISPOSABLE 25GX5/8"
1 NEEDLE, DISPOSABLE MISCELLANEOUS
Qty: 50 EACH | Refills: 1 | Status: SHIPPED | OUTPATIENT
Start: 2022-09-01 | End: 2024-01-26 | Stop reason: SDUPTHER

## 2022-09-01 RX ORDER — TESTOSTERONE CYPIONATE 200 MG/ML
200 INJECTION, SOLUTION INTRAMUSCULAR
Qty: 2 ML | Refills: 5 | Status: SHIPPED | OUTPATIENT
Start: 2022-09-01 | End: 2022-09-29

## 2022-09-21 ENCOUNTER — OUTPATIENT INFUSION SERVICES (OUTPATIENT)
Dept: ONCOLOGY | Facility: MEDICAL CENTER | Age: 52
End: 2022-09-21
Attending: INTERNAL MEDICINE
Payer: COMMERCIAL

## 2022-09-21 VITALS
BODY MASS INDEX: 30.17 KG/M2 | DIASTOLIC BLOOD PRESSURE: 96 MMHG | HEART RATE: 98 BPM | WEIGHT: 203.71 LBS | RESPIRATION RATE: 16 BRPM | HEIGHT: 69 IN | TEMPERATURE: 97.6 F | SYSTOLIC BLOOD PRESSURE: 131 MMHG | OXYGEN SATURATION: 99 %

## 2022-09-21 DIAGNOSIS — D75.1 POLYCYTHEMIA: ICD-10-CM

## 2022-09-21 LAB
HCT VFR BLD CALC: 55 % (ref 42–52)
HGB BLD-MCNC: 18.7 G/DL (ref 14–18)

## 2022-09-21 PROCEDURE — 85014 HEMATOCRIT: CPT

## 2022-09-21 PROCEDURE — 99195 PHLEBOTOMY: CPT

## 2022-09-22 NOTE — PROGRESS NOTES
Pt arrives for therapeutic phlebotomy due to Polycythemia.  Labs drawn as ordered by MD.  Pt's Hgb 18.7  , Hct 55 %.  500 cc blood removed.  Pt tolerated well.  Orthostatic vitals stable, see flowsheet.  Pt denies chest pain, shortness of breath, dizziness, or lightheadedness after treatment. PIV removed, gauze and coban applied to site. Patient will f/u with PCP regarding B/P. Pt DC'd home to self care in good condition. Appointment confirm for next treatment.

## 2022-11-23 DIAGNOSIS — E29.1 MALE HYPOGONADISM: ICD-10-CM

## 2022-11-28 RX ORDER — TESTOSTERONE CYPIONATE 200 MG/ML
126 INJECTION, SOLUTION INTRAMUSCULAR
Qty: 12 ML | Refills: 2 | Status: SHIPPED | OUTPATIENT
Start: 2022-11-28 | End: 2022-12-26

## 2022-12-14 ENCOUNTER — OUTPATIENT INFUSION SERVICES (OUTPATIENT)
Dept: ONCOLOGY | Facility: MEDICAL CENTER | Age: 52
End: 2022-12-14
Attending: INTERNAL MEDICINE
Payer: COMMERCIAL

## 2022-12-14 VITALS
BODY MASS INDEX: 30.14 KG/M2 | WEIGHT: 203.48 LBS | DIASTOLIC BLOOD PRESSURE: 88 MMHG | OXYGEN SATURATION: 98 % | RESPIRATION RATE: 16 BRPM | HEART RATE: 74 BPM | SYSTOLIC BLOOD PRESSURE: 144 MMHG | HEIGHT: 69 IN | TEMPERATURE: 97.1 F

## 2022-12-14 DIAGNOSIS — D75.1 POLYCYTHEMIA: ICD-10-CM

## 2022-12-14 DIAGNOSIS — Z79.899 HIGH RISK MEDICATION USE: ICD-10-CM

## 2022-12-14 DIAGNOSIS — E55.9 VITAMIN D DEFICIENCY: ICD-10-CM

## 2022-12-14 DIAGNOSIS — N25.81 SECONDARY HYPERPARATHYROIDISM (HCC): ICD-10-CM

## 2022-12-14 DIAGNOSIS — R79.89 ABNORMAL SERUM CREATININE LEVEL: ICD-10-CM

## 2022-12-14 DIAGNOSIS — E29.1 MALE HYPOGONADISM: ICD-10-CM

## 2022-12-14 DIAGNOSIS — E53.8 B12 DEFICIENCY: ICD-10-CM

## 2022-12-14 LAB
25(OH)D3 SERPL-MCNC: 61 NG/ML (ref 30–100)
ALBUMIN SERPL BCP-MCNC: 5 G/DL (ref 3.2–4.9)
ALBUMIN/GLOB SERPL: 1.8 G/DL
ALP SERPL-CCNC: 69 U/L (ref 30–99)
ALT SERPL-CCNC: 95 U/L (ref 2–50)
ANION GAP SERPL CALC-SCNC: 12 MMOL/L (ref 7–16)
AST SERPL-CCNC: 44 U/L (ref 12–45)
BILIRUB SERPL-MCNC: 0.5 MG/DL (ref 0.1–1.5)
BUN SERPL-MCNC: 15 MG/DL (ref 8–22)
CALCIUM ALBUM COR SERPL-MCNC: 8.8 MG/DL (ref 8.5–10.5)
CALCIUM SERPL-MCNC: 9.6 MG/DL (ref 8.5–10.5)
CHLORIDE SERPL-SCNC: 103 MMOL/L (ref 96–112)
CO2 SERPL-SCNC: 25 MMOL/L (ref 20–33)
CREAT SERPL-MCNC: 1.2 MG/DL (ref 0.5–1.4)
GFR SERPLBLD CREATININE-BSD FMLA CKD-EPI: 72 ML/MIN/1.73 M 2
GLOBULIN SER CALC-MCNC: 2.8 G/DL (ref 1.9–3.5)
GLUCOSE SERPL-MCNC: 112 MG/DL (ref 65–99)
HCT VFR BLD AUTO: 48.1 % (ref 42–52)
HGB BLD-MCNC: 15.5 G/DL (ref 14–18)
POTASSIUM SERPL-SCNC: 4.1 MMOL/L (ref 3.6–5.5)
PROT SERPL-MCNC: 7.8 G/DL (ref 6–8.2)
PSA SERPL-MCNC: 1.67 NG/ML (ref 0–4)
SODIUM SERPL-SCNC: 140 MMOL/L (ref 135–145)
VIT B12 SERPL-MCNC: 1209 PG/ML (ref 211–911)

## 2022-12-14 PROCEDURE — 84270 ASSAY OF SEX HORMONE GLOBUL: CPT

## 2022-12-14 PROCEDURE — 84153 ASSAY OF PSA TOTAL: CPT

## 2022-12-14 PROCEDURE — 84402 ASSAY OF FREE TESTOSTERONE: CPT

## 2022-12-14 PROCEDURE — 85014 HEMATOCRIT: CPT

## 2022-12-14 PROCEDURE — 84403 ASSAY OF TOTAL TESTOSTERONE: CPT

## 2022-12-14 PROCEDURE — 82607 VITAMIN B-12: CPT

## 2022-12-14 PROCEDURE — 82306 VITAMIN D 25 HYDROXY: CPT

## 2022-12-14 PROCEDURE — 85018 HEMOGLOBIN: CPT

## 2022-12-14 PROCEDURE — 80053 COMPREHEN METABOLIC PANEL: CPT

## 2022-12-14 PROCEDURE — 36415 COLL VENOUS BLD VENIPUNCTURE: CPT

## 2022-12-15 NOTE — PROGRESS NOTES
Pt arrives for therapeutic phlebotomy for polycythemia.  Discussed plan of care with pt.  Pt requests that lab orders for Dr. Boyle be drawn today.  PIV established to L- and labs were drawn.  Hemoglobin is 15.5 and hematocrit is 48.1% today.  Results do not meet parameters for TP.  PIV removed intact and site wrapped with pressure dressing.  Pt will see Dr. Boyle next month.  Email sent to scheduling dept to set up next appt for the pt.  Pt dc home in stable condition.

## 2022-12-16 LAB
SHBG SERPL-SCNC: 15 NMOL/L (ref 19–76)
TESTOST FREE MFR SERPL: 2.8 % (ref 1.6–2.9)
TESTOST FREE SERPL-MCNC: 234 PG/ML (ref 47–244)
TESTOST SERPL-MCNC: 848 NG/DL (ref 300–890)

## 2023-01-10 ENCOUNTER — TELEMEDICINE (OUTPATIENT)
Dept: ENDOCRINOLOGY | Facility: MEDICAL CENTER | Age: 53
End: 2023-01-10
Attending: INTERNAL MEDICINE
Payer: COMMERCIAL

## 2023-01-10 DIAGNOSIS — E29.1 MALE HYPOGONADISM: ICD-10-CM

## 2023-01-10 DIAGNOSIS — E55.9 VITAMIN D DEFICIENCY: ICD-10-CM

## 2023-01-10 DIAGNOSIS — Z79.899 HIGH RISK MEDICATION USE: ICD-10-CM

## 2023-01-10 DIAGNOSIS — E53.8 B12 DEFICIENCY: ICD-10-CM

## 2023-01-10 PROCEDURE — 99214 OFFICE O/P EST MOD 30 MIN: CPT | Mod: 95 | Performed by: INTERNAL MEDICINE

## 2023-01-10 RX ORDER — TESTOSTERONE CYPIONATE 200 MG/ML
126 INJECTION, SOLUTION INTRAMUSCULAR
Qty: 12 ML | Refills: 2 | Status: SHIPPED | OUTPATIENT
Start: 2023-01-10 | End: 2023-02-07

## 2023-01-10 ASSESSMENT — PATIENT HEALTH QUESTIONNAIRE - PHQ9: CLINICAL INTERPRETATION OF PHQ2 SCORE: 0

## 2023-01-11 NOTE — PROGRESS NOTES
Chief Complaint: Follow up for Secondary Hypogonadism   Patient was presented for a telehealth consultation via secure and encrypted videoconferencing technology. This encounter was conducted via Zoom . Verbal consent was obtained. Patient's identity was verified.    HPI:     Neil Abdi Jr. is a 52 y.o. male here for follow up of Hypogonadism         Since last visit patient reports feeling excellent.    He remains on Testosterone cypionate 100mg every 7 days  which has been his dose for the past 6 months.     He reports excellent compliance and denies missing any weekly or biweekly doses.       He currently denies fatigue,depression, loss of libido, erectile dysfunction.    He currently denies decreased stream, hesitancy, intermittency and post void dribbling.          Last total testosterone was 848 with HCT 48% on 12/2022   He prefers a testosterone of 550-600  He gets regular phlebotomy every 8 to 12 weeks at [facility]    His vitamin D improved to 61  B12 is 1003        Serum creatinine is stable  He also cut back on alcohol as such his liver enzymes are better   Estimated GFR is 80  He has no known risk factors  No new medications  He denies taking NSAIDs            Patient's medications, allergies, and social histories were reviewed and updated as appropriate.      ROS:       CONS:     No fever, no chills   EYES:     No diplopia, no blurry vision   CV:           No chest pain, no palpitations   PULM:     No SOB, no cough, no hemoptysis.   GI:            No nausea, no vomiting, no diarrhea, no constipation   ENDO:     No polyuria, no polydipsia, no heat intolerance, no cold intolerance       Past Medical History:  Problem List:  2022-01: Secondary hyperparathyroidism (HCC)  2022-01: Vitamin D deficiency  2022-01: High risk medication use  2021-02: Anxiety  2020-09: Male hypogonadism  2018-03: Lump in neck  2017-03: Acne vulgaris  2017-03: Chronic gout involving toe of left foot without tophus  2017-03:  Mixed hyperlipidemia  2017: Essential hypertension  2017: Gastroesophageal reflux disease without esophagitis  2017: Multiple allergies  2017: Low testosterone in male  2017: Exercise-induced asthma      Past Surgical History:  Past Surgical History:   Procedure Laterality Date    LAMINOTOMY          Allergies:  Patient has no known allergies.     Social History:  Social History     Tobacco Use    Smoking status: Former     Types: Cigarettes     Quit date: 2002     Years since quittin.4    Smokeless tobacco: Former   Vaping Use    Vaping Use: Never used   Substance Use Topics    Alcohol use: Yes     Alcohol/week: 1.8 oz     Types: 1 Glasses of wine, 1 Cans of beer, 1 Shots of liquor per week    Drug use: No        Family History:   family history includes Cancer in his father, maternal grandfather, mother, paternal grandfather, and paternal uncle; No Known Problems in his sister, sister, and sister.        PHYSICAL EXAM:   Vital signs: There were no vitals taken for this visit.  GENERAL: Well-developed, well-nourished in no apparent distress.   EYE:  No ocular asymmetry, PERRLA  HENT: Pink, moist mucous membranes.     CHEST: no gynecomastia  CARDIOVASCULAR:  No murmurs  LUNGS: Clear breath sounds  ABDOMEN: Soft, nontender   GENIT: deferred  EXTREMITIES: No clubbing, cyanosis, or edema.   NEUROLOGICAL: No gross focal motor abnormalities. No visible tremor, no proximal muscle weakness   LYMPH: No cervical adenopathy seen  SKIN: No rashes, lesions.       Labs:  Lab Results   Component Value Date/Time    WBC 7.7 2020 09:20 AM    RBC 6.14 (H) 2020 09:20 AM    HEMOGLOBIN 15.5 2022 04:58 PM    MCV 86.6 2020 09:20 AM    MCH 28.7 2020 09:20 AM    MCHC 33.1 (L) 2020 09:20 AM    RDW 43.7 2020 09:20 AM    MPV 10.7 2020 09:20 AM       Lab Results   Component Value Date/Time    SODIUM 140 2022 04:58 PM    POTASSIUM 4.1 2022 04:58 PM    CHLORIDE  103 12/14/2022 04:58 PM    CO2 25 12/14/2022 04:58 PM    ANION 12.0 12/14/2022 04:58 PM    GLUCOSE 112 (H) 12/14/2022 04:58 PM    BUN 15 12/14/2022 04:58 PM    CREATININE 1.20 12/14/2022 04:58 PM    CALCIUM 9.6 12/14/2022 04:58 PM    ASTSGOT 44 12/14/2022 04:58 PM    ALTSGPT 95 (H) 12/14/2022 04:58 PM    TBILIRUBIN 0.5 12/14/2022 04:58 PM    ALBUMIN 5.0 (H) 12/14/2022 04:58 PM    TOTPROTEIN 7.8 12/14/2022 04:58 PM    GLOBULIN 2.8 12/14/2022 04:58 PM    AGRATIO 1.8 12/14/2022 04:58 PM       No results found for: LH    Lab Results   Component Value Date/Time    FSH 0.5 (L) 03/07/2018 0628       Lab Results   Component Value Date/Time    TESTOSTERONE 643 06/02/2021 1630           Imaging:    ASSESSMENT/PLAN:      1. Male hypogonadism  Controlled  Patient feels better with a lower testosterone  And going to adjust his medication to 80 mg every 7 days but keep the prescription the same so that there is no issue with the pharmacy  I have renewed his phlebotomy orders  We are going to repeat his testosterone levels in 3 months and I will contact him  Follow-up in 6 months with repeat of testosterone and hematocrit levels    2. Vitamin D deficiency  Stable   Vitamin D labs were reviewed with patient  Continue current supplements  Continue monitoring levels     3. B12 deficiency  Controlled   Continue B12 supplements  Continue monitoring      4. High risk medication use  Patient is taking testosterone which is a high risk medication      Return in about 6 months (around 7/10/2023).      Thank you kindly for allowing me to participate in the endocrine care plan for this patient.    Shen Boyle MD, FACE, [facility]      CC:   ARA Hunt

## 2023-03-08 ENCOUNTER — OUTPATIENT INFUSION SERVICES (OUTPATIENT)
Dept: ONCOLOGY | Facility: MEDICAL CENTER | Age: 53
End: 2023-03-08
Attending: INTERNAL MEDICINE
Payer: COMMERCIAL

## 2023-03-08 VITALS
HEART RATE: 73 BPM | OXYGEN SATURATION: 99 % | RESPIRATION RATE: 18 BRPM | BODY MASS INDEX: 31.38 KG/M2 | TEMPERATURE: 97.2 F | DIASTOLIC BLOOD PRESSURE: 89 MMHG | SYSTOLIC BLOOD PRESSURE: 149 MMHG | HEIGHT: 69 IN | WEIGHT: 211.86 LBS

## 2023-03-08 DIAGNOSIS — E53.8 B12 DEFICIENCY: ICD-10-CM

## 2023-03-08 DIAGNOSIS — E29.1 MALE HYPOGONADISM: ICD-10-CM

## 2023-03-08 DIAGNOSIS — Z79.899 HIGH RISK MEDICATION USE: ICD-10-CM

## 2023-03-08 DIAGNOSIS — E55.9 VITAMIN D DEFICIENCY: ICD-10-CM

## 2023-03-08 LAB
25(OH)D3 SERPL-MCNC: 57 NG/ML (ref 30–100)
ALBUMIN SERPL BCP-MCNC: 4.9 G/DL (ref 3.2–4.9)
ALBUMIN/GLOB SERPL: 1.7 G/DL
ALP SERPL-CCNC: 68 U/L (ref 30–99)
ALT SERPL-CCNC: 113 U/L (ref 2–50)
ANION GAP SERPL CALC-SCNC: 12 MMOL/L (ref 7–16)
AST SERPL-CCNC: 53 U/L (ref 12–45)
BILIRUB SERPL-MCNC: 0.4 MG/DL (ref 0.1–1.5)
BUN SERPL-MCNC: 18 MG/DL (ref 8–22)
CALCIUM ALBUM COR SERPL-MCNC: 8.7 MG/DL (ref 8.5–10.5)
CALCIUM SERPL-MCNC: 9.4 MG/DL (ref 8.5–10.5)
CHLORIDE SERPL-SCNC: 103 MMOL/L (ref 96–112)
CO2 SERPL-SCNC: 25 MMOL/L (ref 20–33)
CREAT SERPL-MCNC: 1.13 MG/DL (ref 0.5–1.4)
GFR SERPLBLD CREATININE-BSD FMLA CKD-EPI: 77 ML/MIN/1.73 M 2
GLOBULIN SER CALC-MCNC: 2.9 G/DL (ref 1.9–3.5)
GLUCOSE SERPL-MCNC: 104 MG/DL (ref 65–99)
HCT VFR BLD AUTO: 49.5 % (ref 42–52)
HGB BLD-MCNC: 15.6 G/DL (ref 14–18)
POTASSIUM SERPL-SCNC: 4.4 MMOL/L (ref 3.6–5.5)
PROT SERPL-MCNC: 7.8 G/DL (ref 6–8.2)
SODIUM SERPL-SCNC: 140 MMOL/L (ref 135–145)
T4 FREE SERPL-MCNC: 0.93 NG/DL (ref 0.93–1.7)
TSH SERPL DL<=0.005 MIU/L-ACNC: 1.59 UIU/ML (ref 0.38–5.33)
VIT B12 SERPL-MCNC: 1191 PG/ML (ref 211–911)

## 2023-03-08 PROCEDURE — 84270 ASSAY OF SEX HORMONE GLOBUL: CPT

## 2023-03-08 PROCEDURE — 84403 ASSAY OF TOTAL TESTOSTERONE: CPT

## 2023-03-08 PROCEDURE — 36415 COLL VENOUS BLD VENIPUNCTURE: CPT

## 2023-03-08 PROCEDURE — 82306 VITAMIN D 25 HYDROXY: CPT

## 2023-03-08 PROCEDURE — 84443 ASSAY THYROID STIM HORMONE: CPT

## 2023-03-08 PROCEDURE — 84402 ASSAY OF FREE TESTOSTERONE: CPT

## 2023-03-08 PROCEDURE — 85018 HEMOGLOBIN: CPT

## 2023-03-08 PROCEDURE — 85014 HEMATOCRIT: CPT

## 2023-03-08 PROCEDURE — 80053 COMPREHEN METABOLIC PANEL: CPT

## 2023-03-08 PROCEDURE — 84439 ASSAY OF FREE THYROXINE: CPT

## 2023-03-08 PROCEDURE — 82607 VITAMIN B-12: CPT

## 2023-03-09 NOTE — PROGRESS NOTES
Pt arrive ambulatory to [facility] for therapeutic phlebotomy for polycythemia.  POC discussed and pt verbalized understanding. Pt requests outside labs to be drawn for Dr. Boyle be drawn.  PIV established to LAC, brisk blood return noted and labs drawn. Pt tolerated well  Hemoglobin 15.6.  Pt labs do not meet parameters for TP.  PIV removed intact and site wrapped with pressure dressing. Email sent to scheduling dept to set up next appt for the pt, pt confirmed appt made on [employer].  Pt dc home in [facility].

## 2023-03-10 LAB
SHBG SERPL-SCNC: 13 NMOL/L (ref 19–76)
TESTOST FREE MFR SERPL: 2.8 % (ref 1.6–2.9)
TESTOST FREE SERPL-MCNC: 183 PG/ML (ref 47–244)
TESTOST SERPL-MCNC: 656 NG/DL (ref 300–890)

## 2023-03-13 NOTE — TELEPHONE ENCOUNTER
"Received fax from pharmacy. \"Hi, the [place] brand is not available . Pt is willing to try the brand we have in stock but we need the okay to do so b/c the prescription says to dispense wet almaraz only, thanks\"   "
Phone Number Called: [phone]    Call outcome: Spoke to patient regarding message below.    Message: Contacted patient. Patient is willing to try other brands if this brand is not available.   
[Time Spent: ___ minutes] : I have spent [unfilled] minutes of time on the encounter.

## 2023-05-09 PROBLEM — S83.241D: Status: ACTIVE | Noted: 2023-05-09

## 2023-05-09 PROBLEM — S83.511A COMPLETE TEAR OF RIGHT ACL: Status: ACTIVE | Noted: 2023-05-09

## 2023-05-31 ENCOUNTER — OUTPATIENT INFUSION SERVICES (OUTPATIENT)
Dept: ONCOLOGY | Facility: MEDICAL CENTER | Age: 53
End: 2023-05-31
Attending: INTERNAL MEDICINE
Payer: COMMERCIAL

## 2023-05-31 VITALS
BODY MASS INDEX: 31.44 KG/M2 | HEIGHT: 69 IN | WEIGHT: 212.3 LBS | TEMPERATURE: 97.5 F | OXYGEN SATURATION: 97 % | HEART RATE: 88 BPM | RESPIRATION RATE: 18 BRPM | DIASTOLIC BLOOD PRESSURE: 94 MMHG | SYSTOLIC BLOOD PRESSURE: 148 MMHG

## 2023-05-31 DIAGNOSIS — E29.1 MALE HYPOGONADISM: ICD-10-CM

## 2023-05-31 LAB
HCT VFR BLD AUTO: 51.1 % (ref 42–52)
HGB BLD-MCNC: 16.3 G/DL (ref 14–18)
TESTOST SERPL-MCNC: 508 NG/DL (ref 175–781)

## 2023-05-31 PROCEDURE — 85018 HEMOGLOBIN: CPT

## 2023-05-31 PROCEDURE — 85014 HEMATOCRIT: CPT

## 2023-05-31 PROCEDURE — 36415 COLL VENOUS BLD VENIPUNCTURE: CPT

## 2023-05-31 PROCEDURE — 84403 ASSAY OF TOTAL TESTOSTERONE: CPT

## 2023-06-01 NOTE — PROGRESS NOTES
Mr. Abdi arrived to the infusion center for therapeutic phlebotomy for polycythemia. He is in good spirits today. PIV established and labs drawn. Hemoglobin was 16.3 and did not meet parameters for TP treatment today. PIV flushed and discontinued with tip intact. Left infusion center with no apparent distress and confirmed next appointment.

## 2023-06-27 ENCOUNTER — OFFICE VISIT (OUTPATIENT)
Dept: ENDOCRINOLOGY | Facility: MEDICAL CENTER | Age: 53
End: 2023-06-27
Attending: INTERNAL MEDICINE
Payer: COMMERCIAL

## 2023-06-27 VITALS
BODY MASS INDEX: 30.42 KG/M2 | OXYGEN SATURATION: 98 % | HEIGHT: 70 IN | DIASTOLIC BLOOD PRESSURE: 78 MMHG | WEIGHT: 212.5 LBS | SYSTOLIC BLOOD PRESSURE: 124 MMHG | HEART RATE: 104 BPM

## 2023-06-27 DIAGNOSIS — E53.8 B12 DEFICIENCY: ICD-10-CM

## 2023-06-27 DIAGNOSIS — E29.1 MALE HYPOGONADISM: ICD-10-CM

## 2023-06-27 DIAGNOSIS — E55.9 VITAMIN D DEFICIENCY: ICD-10-CM

## 2023-06-27 DIAGNOSIS — Z79.899 HIGH RISK MEDICATION USE: ICD-10-CM

## 2023-06-27 PROCEDURE — 99214 OFFICE O/P EST MOD 30 MIN: CPT | Performed by: INTERNAL MEDICINE

## 2023-06-27 PROCEDURE — 3078F DIAST BP <80 MM HG: CPT | Performed by: INTERNAL MEDICINE

## 2023-06-27 PROCEDURE — 99211 OFF/OP EST MAY X REQ PHY/QHP: CPT | Performed by: INTERNAL MEDICINE

## 2023-06-27 PROCEDURE — 3074F SYST BP LT 130 MM HG: CPT | Performed by: INTERNAL MEDICINE

## 2023-06-27 RX ORDER — TESTOSTERONE CYPIONATE 200 MG/ML
100 INJECTION, SOLUTION INTRAMUSCULAR
Qty: 4 ML | Refills: 5 | Status: SHIPPED | OUTPATIENT
Start: 2023-06-27 | End: 2024-01-03

## 2023-06-27 NOTE — PROGRESS NOTES
Chief Complaint: Follow up for Secondary Hypogonadism     HPI:     Neil Abdi Jr. is a 52 y.o. male here for follow up of Hypogonadism         Since last visit patient reports feeling excellent.    He remains on Testosterone cypionate 80 mg every 7 days  which has been his dose for the past 6 months.     He reports excellent compliance and denies missing any weekly or biweekly doses.       He currently denies fatigue,depression, loss of libido, erectile dysfunction.    He currently denies decreased stream, hesitancy, intermittency and post void dribbling.          Last total testosterone was 508 on 5/2023  Hct was 51%  Prior to this total testosterone  was 656 on 3/2023  He gets regular phlebotomy every 8 to 12 weeks at [facility]      PSA was 1.67  Vitamin D was 57 on 3/2023  PTH is normal   B12 was 1191                Patient's medications, allergies, and social histories were reviewed and updated as appropriate.      ROS:       CONS:     No fever, no chills   EYES:     No diplopia, no blurry vision   CV:           No chest pain, no palpitations   PULM:     No SOB, no cough, no hemoptysis.   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     Social History:  Social History     Tobacco Use    Smoking status: Former     Types: Cigarettes     Quit date: 2002     Years since quittin.9    Smokeless tobacco: Former   Vaping Use    Vaping Use: Never used   Substance Use Topics    Alcohol use: Yes     Alcohol/week: 1.8 oz     Types: 1 Glasses of wine, 1 Cans of beer, 1 Shots of liquor per week    Drug use: No        Family History:   family history includes Cancer in his father, maternal grandfather, mother, paternal grandfather, and paternal uncle; No Known Problems in his sister, sister, and sister.        PHYSICAL EXAM:   Vital signs: /78 (BP Location: Left arm, Patient Position: Sitting)   Pulse (!) 104   Ht 1.778 m (5' 10\")   Wt 96.4 kg (212 lb 8 oz)   SpO2 98%   BMI 30.49 kg/m²   GENERAL: Well-developed, well-nourished in no apparent distress.   EYE:  No ocular asymmetry, PERRLA  HENT: Pink, moist mucous membranes.     CHEST: no gynecomastia  CARDIOVASCULAR:  No murmurs  LUNGS: Clear breath sounds  ABDOMEN: Soft, nontender   GENIT: deferred  EXTREMITIES: No clubbing, cyanosis, or edema.   NEUROLOGICAL: No gross focal motor abnormalities. No visible tremor, no proximal muscle weakness   LYMPH: No cervical adenopathy palpated.   SKIN: No rashes, lesions.       Labs:  Lab Results   Component Value Date/Time    WBC 7.7 2020 09:20 AM    RBC 6.14 (H) 2020 09:20 AM    HEMOGLOBIN 16.3 2023 04:13 PM    MCV 86.6 2020 09:20 AM    MCH 28.7 2020 09:20 AM    MCHC 33.1 (L) 2020 09:20 AM    RDW 43.7 2020 09:20 AM    MPV 10.7 2020 09:20 AM       Lab Results   Component Value Date/Time    SODIUM 140 2023 04:20 PM    POTASSIUM 4.4 2023 04:20 PM    CHLORIDE 103 2023 04:20 PM    CO2 25 2023 04:20 PM    ANION 12.0 2023 04:20 PM    GLUCOSE 104 (H) " 03/08/2023 04:20 PM    BUN 18 03/08/2023 04:20 PM    CREATININE 1.13 03/08/2023 04:20 PM    CALCIUM 9.4 03/08/2023 04:20 PM    ASTSGOT 53 (H) 03/08/2023 04:20 PM    ALTSGPT 113 (H) 03/08/2023 04:20 PM    TBILIRUBIN 0.4 03/08/2023 04:20 PM    ALBUMIN 4.9 03/08/2023 04:20 PM    TOTPROTEIN 7.8 03/08/2023 04:20 PM    GLOBULIN 2.9 03/08/2023 04:20 PM    AGRATIO 1.7 03/08/2023 04:20 PM       No results found for: LH    Lab Results   Component Value Date/Time    FSH 0.5 (L) 03/07/2018 0628       Lab Results   Component Value Date/Time    TESTOSTERONE 643 06/02/2021 1630           Imaging:    ASSESSMENT/PLAN:      1. Male hypogonadism  Controlled  He prefers to keep his testosterone around 550-600  Adjust  testosterone 90 mg every 7 days  Continue phlebotomy  Controlled substance agreement form was signed  Follow-up in 6 months with repeat of testosterone and hematocrit levels    2. Vitamin D deficiency  Controlled   continue  vitamin D3 5000 units daily  Continue monitoring    4. B12 deficiency  Controlled   Continue B12 supplements  Continue monitoring    6. High risk medication use  Patient is taking testosterone which is a high risk medication      Return in about 6 months (around 12/27/2023).      Thank you kindly for allowing me to participate in the endocrine care plan for this patient.    Shen Boyle MD, FACE, [facility]      CC:   Oswald Stinson, [email or web address].

## 2023-08-23 ENCOUNTER — TELEPHONE (OUTPATIENT)
Dept: ENDOCRINOLOGY | Facility: MEDICAL CENTER | Age: 53
End: 2023-08-23
Payer: COMMERCIAL

## 2023-08-23 ENCOUNTER — OUTPATIENT INFUSION SERVICES (OUTPATIENT)
Dept: ONCOLOGY | Facility: MEDICAL CENTER | Age: 53
End: 2023-08-23
Attending: INTERNAL MEDICINE
Payer: COMMERCIAL

## 2023-08-23 VITALS
BODY MASS INDEX: 30.92 KG/M2 | HEART RATE: 92 BPM | WEIGHT: 208.78 LBS | OXYGEN SATURATION: 98 % | DIASTOLIC BLOOD PRESSURE: 97 MMHG | RESPIRATION RATE: 16 BRPM | SYSTOLIC BLOOD PRESSURE: 146 MMHG | TEMPERATURE: 97.3 F | HEIGHT: 69 IN

## 2023-08-23 DIAGNOSIS — D75.1 POLYCYTHEMIA: ICD-10-CM

## 2023-08-23 LAB
HCT VFR BLD AUTO: 51.9 % (ref 42–52)
HGB BLD-MCNC: 16.6 G/DL (ref 14–18)

## 2023-08-23 PROCEDURE — 85018 HEMOGLOBIN: CPT

## 2023-08-23 PROCEDURE — 85014 HEMATOCRIT: CPT

## 2023-08-23 PROCEDURE — 36415 COLL VENOUS BLD VENIPUNCTURE: CPT

## 2023-08-23 ASSESSMENT — PAIN DESCRIPTION - PAIN TYPE: TYPE: SURGICAL PAIN

## 2023-08-24 NOTE — PROGRESS NOTES
Neil presents to [facility] for therapeutic phlebotomy.  PIV placed to right AC, labs collected and reviewed.  Hgb 16.6.  Parameters NOT met for TP.   PIV flushed and removed, gauze and coban to site.  Neil discharged in NAD, next appointment scheduled and confirmed.

## 2023-09-12 NOTE — TELEPHONE ENCOUNTER
Received request via: Pharmacy    Was the patient seen in the last year in this department? Yes    Does the patient have an active prescription (recently filled or refills available) for medication(s) requested? No  
12-Sep-2023 08:23

## 2023-09-28 NOTE — ASSESSMENT & PLAN NOTE
Chronic in nature. Patient was previously stable on testosterone and managed by primary care provider in [place], [place]. 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 Patient states that he has decreased his dose of testosterone to 0.55 mL. But states that he is noticing increased acne, increased irritation, frequent crying and mood swings. Prior to starting testosterone patient's testosterone level was below 200 patient stated that lowest level was 160.  Patient is requesting referral to endocrinology at this time. Liver enzyme is more increased discussed with patient who attributes this to dehydration at the time his labs were drawn discussed with patient that this could gain more serious liver inflammation, patient declines further testing at this time. Patient denies abdominal pain, change in stool. Patient requested estrogens ordered with this lab work, which were abnormal patient states these were frequently checked by his previous PCP.   
Chronic in nature. Stable. Patient is requesting refill of inhaler at this time. Patient denies increased shortness of breath, wheezing or nighttime waking. he denies side effects from medication.  
This is a new problem. Patient states that he has noticed a round lump which has not changed in size since he noticed it several months ago. Patient states that he has not found anything that makes it better or worse patient states that it is not painful.  
No

## 2023-10-03 ENCOUNTER — TELEPHONE (OUTPATIENT)
Dept: ENDOCRINOLOGY | Facility: MEDICAL CENTER | Age: 53
End: 2023-10-03
Payer: COMMERCIAL

## 2023-10-03 NOTE — TELEPHONE ENCOUNTER
Received Renewal PA request via [employer]  for testosterone cypionate (Depo-Testosterone) 200mg/ml.    (Quantity:4mls, Day Supply:28)     Insurance: [employer]  Member ID: [id number]  BIN: [id number]  PCN: [id number]  Group: [id number]     Ran Test claim via [employer] & medication Rejects stating prior authorization is required.

## 2023-10-03 NOTE — TELEPHONE ENCOUNTER
Prior Authorization for testosterone cypionate (Depo-Testosterone) 200mg/ml (Quantity: 4mls, Days: 28) has been submitted via Cover My Meds: Key ([id number])    Insurance: [facility]    Attached chart notes & lab values and answered clinical questions     Will follow up in 24-48 business hours.

## 2023-10-04 NOTE — TELEPHONE ENCOUNTER
Prior Authorization for Depo-Testosterone 200MG/ML solution has been approved for a quantity of 4 mls , day supply 28    Prior Authorization reference number: [id number]    Insurance: [employer]     Effective dates: 09/03/23-10/02/24    Copay:LAST FILL DT 10/04/23 FILLED AT PHARMACY [facility] [id number]     Is patient eligible to fill with [facility]? Unknown RTS    Pharmacy on File  [id number] - [place], [place] - [id number]    Next Steps:  Routing Approval to Pharmacy Coordinator pool

## 2023-11-14 RX ORDER — SODIUM CHLORIDE 9 MG/ML
500 INJECTION, SOLUTION INTRAVENOUS ONCE
Status: CANCELLED | OUTPATIENT
Start: 2023-11-14 | End: 2023-11-14

## 2023-11-14 RX ORDER — ATROPINE SULFATE 1 MG/ML
0.5 INJECTION, SOLUTION INTRAVENOUS PRN
Status: CANCELLED | OUTPATIENT
Start: 2023-11-14

## 2023-11-15 ENCOUNTER — OUTPATIENT INFUSION SERVICES (OUTPATIENT)
Dept: ONCOLOGY | Facility: MEDICAL CENTER | Age: 53
End: 2023-11-15
Attending: INTERNAL MEDICINE
Payer: COMMERCIAL

## 2023-11-15 VITALS
OXYGEN SATURATION: 98 % | RESPIRATION RATE: 18 BRPM | HEART RATE: 79 BPM | SYSTOLIC BLOOD PRESSURE: 160 MMHG | HEIGHT: 69 IN | WEIGHT: 213.85 LBS | BODY MASS INDEX: 31.67 KG/M2 | DIASTOLIC BLOOD PRESSURE: 106 MMHG | TEMPERATURE: 97.3 F

## 2023-11-15 DIAGNOSIS — E55.9 VITAMIN D DEFICIENCY: ICD-10-CM

## 2023-11-15 DIAGNOSIS — Z79.899 HIGH RISK MEDICATION USE: ICD-10-CM

## 2023-11-15 DIAGNOSIS — E29.1 MALE HYPOGONADISM: ICD-10-CM

## 2023-11-15 DIAGNOSIS — E53.8 B12 DEFICIENCY: ICD-10-CM

## 2023-11-15 LAB
25(OH)D3 SERPL-MCNC: 58 NG/ML (ref 30–100)
ALBUMIN SERPL BCP-MCNC: 4.7 G/DL (ref 3.2–4.9)
ALBUMIN/GLOB SERPL: 1.5 G/DL
ALP SERPL-CCNC: 71 U/L (ref 30–99)
ALT SERPL-CCNC: 88 U/L (ref 2–50)
ANION GAP SERPL CALC-SCNC: 14 MMOL/L (ref 7–16)
AST SERPL-CCNC: 40 U/L (ref 12–45)
BILIRUB SERPL-MCNC: 0.4 MG/DL (ref 0.1–1.5)
BUN SERPL-MCNC: 18 MG/DL (ref 8–22)
CALCIUM ALBUM COR SERPL-MCNC: 8.7 MG/DL (ref 8.5–10.5)
CALCIUM SERPL-MCNC: 9.3 MG/DL (ref 8.5–10.5)
CHLORIDE SERPL-SCNC: 102 MMOL/L (ref 96–112)
CO2 SERPL-SCNC: 25 MMOL/L (ref 20–33)
CREAT SERPL-MCNC: 1.15 MG/DL (ref 0.5–1.4)
FERRITIN SERPL-MCNC: 21.2 NG/ML (ref 22–322)
GFR SERPLBLD CREATININE-BSD FMLA CKD-EPI: 76 ML/MIN/1.73 M 2
GLOBULIN SER CALC-MCNC: 3.2 G/DL (ref 1.9–3.5)
GLUCOSE SERPL-MCNC: 114 MG/DL (ref 65–99)
HCT VFR BLD AUTO: 50.5 % (ref 42–52)
HGB BLD-MCNC: 16.3 G/DL (ref 14–18)
POTASSIUM SERPL-SCNC: 4 MMOL/L (ref 3.6–5.5)
PROT SERPL-MCNC: 7.9 G/DL (ref 6–8.2)
SODIUM SERPL-SCNC: 141 MMOL/L (ref 135–145)
VIT B12 SERPL-MCNC: 579 PG/ML (ref 211–911)

## 2023-11-15 PROCEDURE — 85018 HEMOGLOBIN: CPT

## 2023-11-15 PROCEDURE — 82306 VITAMIN D 25 HYDROXY: CPT

## 2023-11-15 PROCEDURE — 84402 ASSAY OF FREE TESTOSTERONE: CPT

## 2023-11-15 PROCEDURE — 80053 COMPREHEN METABOLIC PANEL: CPT

## 2023-11-15 PROCEDURE — 84403 ASSAY OF TOTAL TESTOSTERONE: CPT

## 2023-11-15 PROCEDURE — 82728 ASSAY OF FERRITIN: CPT

## 2023-11-15 PROCEDURE — 82607 VITAMIN B-12: CPT

## 2023-11-15 PROCEDURE — 84270 ASSAY OF SEX HORMONE GLOBUL: CPT

## 2023-11-15 PROCEDURE — 85014 HEMATOCRIT: CPT

## 2023-11-15 PROCEDURE — 99195 PHLEBOTOMY: CPT

## 2023-11-15 RX ORDER — SODIUM CHLORIDE 9 MG/ML
500 INJECTION, SOLUTION INTRAVENOUS ONCE
Status: CANCELLED | OUTPATIENT
Start: 2023-11-15 | End: 2023-11-15

## 2023-11-15 RX ORDER — ATROPINE SULFATE 1 MG/ML
0.5 INJECTION, SOLUTION INTRAVENOUS PRN
Status: CANCELLED | OUTPATIENT
Start: 2023-11-15

## 2023-11-16 NOTE — PROGRESS NOTES
Pt arrives for therapeutic phlebotomy for Polycythemia. Labs drawn as ordered by MD. Pt's Hgb = 16.3 , Hct = 50.5 %. Teaching and education reinforcement and emotional support provided. 1 unit of whole blood removed. Pt tolerated well. Orthostatic vitals stable, see flow sheet. Pt denies chest pain, shortness of breath, dizziness, or lightheadedness after treatment. Pt DC'd home to self care in good condition and in NAD. Appointment confirm for next treatment. Patient aware of s/s of stroke and what to do. Decline going to the ED for elevated blood pressure evaluation.

## 2023-11-18 LAB
SHBG SERPL-SCNC: 15 NMOL/L (ref 19–76)
TESTOST FREE MFR SERPL: 2.7 % (ref 1.6–2.9)
TESTOST FREE SERPL-MCNC: 157 PG/ML (ref 47–244)
TESTOST SERPL-MCNC: 590 NG/DL (ref 300–890)

## 2023-12-31 DIAGNOSIS — E29.1 MALE HYPOGONADISM: ICD-10-CM

## 2024-01-03 RX ORDER — TESTOSTERONE CYPIONATE 200 MG/ML
INJECTION, SOLUTION INTRAMUSCULAR
Qty: 4 ML | Refills: 5 | Status: SHIPPED | OUTPATIENT
Start: 2024-01-03 | End: 2024-01-31

## 2024-01-04 DIAGNOSIS — E29.1 MALE HYPOGONADISM: ICD-10-CM

## 2024-01-10 ENCOUNTER — OFFICE VISIT (OUTPATIENT)
Dept: ENDOCRINOLOGY | Facility: MEDICAL CENTER | Age: 54
End: 2024-01-10
Attending: INTERNAL MEDICINE
Payer: COMMERCIAL

## 2024-01-10 VITALS
RESPIRATION RATE: 18 BRPM | SYSTOLIC BLOOD PRESSURE: 132 MMHG | HEART RATE: 125 BPM | OXYGEN SATURATION: 100 % | BODY MASS INDEX: 30.91 KG/M2 | WEIGHT: 215.9 LBS | HEIGHT: 70 IN | DIASTOLIC BLOOD PRESSURE: 64 MMHG

## 2024-01-10 DIAGNOSIS — E61.1 IRON DEFICIENCY: ICD-10-CM

## 2024-01-10 DIAGNOSIS — E29.1 MALE HYPOGONADISM: ICD-10-CM

## 2024-01-10 DIAGNOSIS — E55.9 VITAMIN D DEFICIENCY: ICD-10-CM

## 2024-01-10 DIAGNOSIS — E53.8 B12 DEFICIENCY: ICD-10-CM

## 2024-01-10 DIAGNOSIS — Z79.899 HIGH RISK MEDICATION USE: ICD-10-CM

## 2024-01-10 PROCEDURE — 3078F DIAST BP <80 MM HG: CPT | Performed by: INTERNAL MEDICINE

## 2024-01-10 PROCEDURE — 99213 OFFICE O/P EST LOW 20 MIN: CPT | Performed by: INTERNAL MEDICINE

## 2024-01-10 PROCEDURE — 3075F SYST BP GE 130 - 139MM HG: CPT | Performed by: INTERNAL MEDICINE

## 2024-01-10 PROCEDURE — 99214 OFFICE O/P EST MOD 30 MIN: CPT | Performed by: INTERNAL MEDICINE

## 2024-01-10 NOTE — PROGRESS NOTES
Chief Complaint: Follow up for Secondary Hypogonadism     HPI:     Neil Abdi Jr. is a 53 y.o. male here for follow up of Hypogonadism         Since last visit patient reports feeling fair    He remains on Testosterone cypionate 90 mg every 7 days  which has been his dose for the past 6 months.     He reports excellent compliance and denies missing any weekly or biweekly doses.     He gives blood regularly every 6 to 8 weeks as vital and  He feels better when his total testosterone ranges between 550-600  He currently denies fatigue,depression, loss of libido, erectile dysfunction.    He currently denies decreased stream, hesitancy, intermittency and post void dribbling.        Since I last saw him he injured his right knee while doing Medieval play  He broke the right tibial plateau and sustained a meniscus injury and ACL tear and underwent arthroscopic repair 6 months ago and is still undergoing physical therapy.  He is now taking Aleve for pain      His most recent labs show total testosterone of 590 on November 15, 2023 with a hematocrit of 50% and normal serum creatinine of 1.15 with a GFR of 76 vitamin D 58 and B12 of 579.  His ferritin is low at 21.  This          Prior labs:  testosterone was 508 on 5/2023  Hct was 51%  Prior to this total testosterone  was 656 on 3/2023  He gets regular phlebotomy every 8 to 12 weeks at [facility]      PSA was 1.67  Vitamin D was 57 on 3/2023  PTH is normal   B12 was 1191                Patient's medications, allergies, and social histories were reviewed and updated as appropriate.      ROS:       CONS:     No fever, no chills   EYES:     No diplopia, no blurry vision   CV:           No chest pain, no palpitations   PULM:     No SOB, no cough, no hemoptysis.   GI:            No nausea, no vomiting, no diarrhea, no constipation   ENDO:     No polyuria, no polydipsia, no heat intolerance, no cold intolerance       Past Medical History:  Problem List:  2023-05:  Complete tear of right ACL  2023: Tear of medial meniscus of right knee, current, subsequent   encounter  2022: Secondary hyperparathyroidism (HCC)  2022: Vitamin D deficiency  2022: High risk medication use  2021: Anxiety  2020: Male hypogonadism  2018: Lump in neck  2017: Acne vulgaris  2017: Chronic gout involving toe of left foot without tophus  2017: Mixed hyperlipidemia  2017: Essential hypertension  2017: Gastroesophageal reflux disease without esophagitis  2017: Multiple allergies  2017: Low testosterone in male  2017: Exercise-induced asthma      Past Surgical History:  Past Surgical History:   Procedure Laterality Date    PB KNEE SCOPE,AID ANT CRUCIATE REPAIR Right 2023    Procedure: RIGHT KNEE ARTHROSCOPY, ANTERIOR CRUCIATE LIGAMENT RECONSTRUCTION WITH ALLOGRAFT;  Surgeon: Shen Simeon M.D.;  Location: [facility];  Service: Orthopedics    PB KNEE SCOPE,MED/LAT MENISECTOMY Right 2023    Procedure: RIGHT PARTIAL MEDIAL MENISCECTOMY VERSUS;  Surgeon: Shen Simeon M.D.;  Location: [facility];  Service: Orthopedics    PB KNEE SCOPE,MED OR LAT MENIS REPAIR Right 2023    Procedure: RIGHT MEDIAL MENISCUS REPAIR, REPAIRS AS INDICATED;  Surgeon: Shen Simeon M.D.;  Location: [facility];  Service: Orthopedics    LAMINOTOMY          Allergies:  Patient has no known allergies.     Social History:  Social History     Tobacco Use    Smoking status: Former     Current packs/day: 0.00     Types: Cigarettes     Quit date: 2002     Years since quittin.4    Smokeless tobacco: Former   Vaping Use    Vaping Use: Never used   Substance Use Topics    Alcohol use: Yes     Alcohol/week: 1.8 oz     Types: 1 Glasses of wine, 1 Cans of beer, 1 Shots of liquor per week    Drug use: No        Family History:   family history includes Cancer in his father, maternal grandfather, mother,  "paternal grandfather, and paternal uncle; No Known Problems in his sister, sister, and sister.        PHYSICAL EXAM:   Vital signs: /64 (BP Location: Left arm, Patient Position: Sitting, BP Cuff Size: Adult)   Pulse (!) 125   Resp 18   Ht 1.778 m (5' 10\")   Wt 97.9 kg (215 lb 14.4 oz)   SpO2 100%   BMI 30.98 kg/m²   GENERAL: Well-developed, well-nourished in no apparent distress.   EYE:  No ocular asymmetry, PERRLA  HENT: Pink, moist mucous membranes.     CHEST: no gynecomastia  CARDIOVASCULAR:  No murmurs  LUNGS: Clear breath sounds  ABDOMEN: Soft, nontender   GENIT: deferred  EXTREMITIES: No clubbing, cyanosis, or edema.   NEUROLOGICAL: No gross focal motor abnormalities. No visible tremor, no proximal muscle weakness   LYMPH: No cervical adenopathy palpated.   SKIN: No rashes, lesions.       Labs:  Lab Results   Component Value Date/Time    WBC 7.7 06/12/2020 09:20 AM    RBC 6.14 (H) 06/12/2020 09:20 AM    HEMOGLOBIN 16.3 11/15/2023 04:55 PM    MCV 86.6 06/12/2020 09:20 AM    MCH 28.7 06/12/2020 09:20 AM    MCHC 33.1 (L) 06/12/2020 09:20 AM    RDW 43.7 06/12/2020 09:20 AM    MPV 10.7 06/12/2020 09:20 AM       Lab Results   Component Value Date/Time    SODIUM 141 11/15/2023 04:55 PM    POTASSIUM 4.0 11/15/2023 04:55 PM    CHLORIDE 102 11/15/2023 04:55 PM    CO2 25 11/15/2023 04:55 PM    ANION 14.0 11/15/2023 04:55 PM    GLUCOSE 114 (H) 11/15/2023 04:55 PM    BUN 18 11/15/2023 04:55 PM    CREATININE 1.15 11/15/2023 04:55 PM    CALCIUM 9.3 11/15/2023 04:55 PM    ASTSGOT 40 11/15/2023 04:55 PM    ALTSGPT 88 (H) 11/15/2023 04:55 PM    TBILIRUBIN 0.4 11/15/2023 04:55 PM    ALBUMIN 4.7 11/15/2023 04:55 PM    TOTPROTEIN 7.9 11/15/2023 04:55 PM    GLOBULIN 3.2 11/15/2023 04:55 PM    AGRATIO 1.5 11/15/2023 04:55 PM       No results found for: LH    Lab Results   Component Value Date/Time    FSH 0.5 (L) 03/07/2018 0628       Lab Results   Component Value Date/Time    TESTOSTERONE 643 06/02/2021 1630 "           Imaging:    ASSESSMENT/PLAN:      1. Male hypogonadism  Controlled  He prefers to keep his testosterone around 550-600  He is meeting this goal   Continue IM Testosterone 90 mg every 7 days  Continue phlebot[facility] 6-8 weeks at [facility]   Controlled substance agreement form was signed  Follow-up in 6 months with repeat of testosterone and hematocrit levels    2. Vitamin D deficiency  Controlled   continue  vitamin D3 5000 units daily  Continue monitoring    4. B12 deficiency  Controlled   Continue B12 supplements  Continue monitoring    5. Iron deficiency  Uncontrolled ferritin levels are low most likely because of periodic phlebotomy  I recommend that he start ferrous sulfate 325 mg daily over-the-counter  Will continue to monitor iron levels    6. High risk medication use  Patient is taking testosterone which is a high risk medication      Return in about 6 months (around 7/10/2024).      Thank you kindly for allowing me to participate in the endocrine care plan for this patient.    Shen Boyle MD, FACE, [facility]      CC:   ARA Hunt

## 2024-01-26 DIAGNOSIS — E29.1 HYPOGONADISM IN MALE: ICD-10-CM

## 2024-01-28 RX ORDER — NEEDLES, DISPOSABLE 25GX5/8"
1 NEEDLE, DISPOSABLE MISCELLANEOUS
Qty: 50 EACH | Refills: 1 | Status: SHIPPED | OUTPATIENT
Start: 2024-01-28

## 2024-02-07 ENCOUNTER — OUTPATIENT INFUSION SERVICES (OUTPATIENT)
Dept: ONCOLOGY | Facility: MEDICAL CENTER | Age: 54
End: 2024-02-07
Attending: INTERNAL MEDICINE
Payer: COMMERCIAL

## 2024-02-07 VITALS
OXYGEN SATURATION: 97 % | DIASTOLIC BLOOD PRESSURE: 94 MMHG | HEART RATE: 90 BPM | TEMPERATURE: 96.9 F | WEIGHT: 221.56 LBS | SYSTOLIC BLOOD PRESSURE: 158 MMHG | BODY MASS INDEX: 32.82 KG/M2 | RESPIRATION RATE: 18 BRPM | HEIGHT: 69 IN

## 2024-02-07 DIAGNOSIS — E29.1 MALE HYPOGONADISM: ICD-10-CM

## 2024-02-07 LAB
FERRITIN SERPL-MCNC: 23 NG/ML (ref 22–322)
HCT VFR BLD AUTO: 50.7 % (ref 42–52)
HGB BLD-MCNC: 16.1 G/DL (ref 14–18)

## 2024-02-07 PROCEDURE — 82728 ASSAY OF FERRITIN: CPT

## 2024-02-07 PROCEDURE — 85014 HEMATOCRIT: CPT

## 2024-02-07 PROCEDURE — 85018 HEMOGLOBIN: CPT

## 2024-02-07 PROCEDURE — 99195 PHLEBOTOMY: CPT

## 2024-02-07 RX ORDER — SODIUM CHLORIDE 9 MG/ML
500 INJECTION, SOLUTION INTRAVENOUS ONCE
OUTPATIENT
Start: 2024-02-07 | End: 2024-02-07

## 2024-02-07 RX ORDER — ATROPINE SULFATE 1 MG/ML
0.5 INJECTION, SOLUTION INTRAVENOUS PRN
OUTPATIENT
Start: 2024-02-07

## 2024-02-07 ASSESSMENT — PAIN DESCRIPTION - PAIN TYPE: TYPE: ACUTE PAIN

## 2024-02-08 NOTE — PROGRESS NOTES
[facility]. PIV started in right AC, brisk blood return observed and flushed easily. Labs drawn as ordered. Hgb = 16.1 and Hct = 50.7. Pt meets parameters for treatment. 500 cc whole blood phlebotomized per order, pt tolerated well. Orthostatic blood pressures taken and pt WNL. PIV flushed and removed, gauze and Coban dressing placed. Schedulers emailed about future appointments.

## 2024-04-03 ENCOUNTER — OUTPATIENT INFUSION SERVICES (OUTPATIENT)
Dept: ONCOLOGY | Facility: MEDICAL CENTER | Age: 54
End: 2024-04-03
Attending: INTERNAL MEDICINE
Payer: COMMERCIAL

## 2024-04-03 VITALS
DIASTOLIC BLOOD PRESSURE: 98 MMHG | BODY MASS INDEX: 32.13 KG/M2 | TEMPERATURE: 97.1 F | HEART RATE: 84 BPM | WEIGHT: 216.93 LBS | RESPIRATION RATE: 18 BRPM | HEIGHT: 69 IN | SYSTOLIC BLOOD PRESSURE: 154 MMHG | OXYGEN SATURATION: 96 %

## 2024-04-03 DIAGNOSIS — E29.1 MALE HYPOGONADISM: ICD-10-CM

## 2024-04-03 LAB
FERRITIN SERPL-MCNC: 23.3 NG/ML (ref 22–322)
HCT VFR BLD AUTO: 51.6 % (ref 42–52)
HGB BLD-MCNC: 16.6 G/DL (ref 14–18)

## 2024-04-03 PROCEDURE — 85018 HEMOGLOBIN: CPT

## 2024-04-03 PROCEDURE — 85014 HEMATOCRIT: CPT

## 2024-04-03 PROCEDURE — 82728 ASSAY OF FERRITIN: CPT

## 2024-04-03 PROCEDURE — 99195 PHLEBOTOMY: CPT

## 2024-04-03 RX ORDER — SODIUM CHLORIDE 9 MG/ML
500 INJECTION, SOLUTION INTRAVENOUS ONCE
OUTPATIENT
Start: 2024-04-03 | End: 2024-04-03

## 2024-04-03 RX ORDER — ATROPINE SULFATE 1 MG/ML
0.5 INJECTION, SOLUTION INTRAVENOUS PRN
OUTPATIENT
Start: 2024-04-03

## 2024-04-03 NOTE — PROGRESS NOTES
Pt arrived ambulatory for TP, denies c/o, states he has been well since last appt.  Plan of care reviewed, pt verbalized understanding and wishes to proceed.  PIV started in RAC with good blood return, labs drawn. H&H reviewed, results meet parameters for TP.  500cc blood removed over 10 minutes, pt tolerated well, no issues noted.  Observed for 15 minutes after, VS stable sitting and standing, Dc'd home without incident and will f/u as scheduled.   Scheduling emailed to add next appt, pt will check details in My Chart.

## 2024-05-29 ENCOUNTER — OUTPATIENT INFUSION SERVICES (OUTPATIENT)
Dept: ONCOLOGY | Facility: MEDICAL CENTER | Age: 54
End: 2024-05-29
Attending: INTERNAL MEDICINE
Payer: COMMERCIAL

## 2024-05-29 VITALS
TEMPERATURE: 97.2 F | OXYGEN SATURATION: 96 % | HEART RATE: 79 BPM | DIASTOLIC BLOOD PRESSURE: 101 MMHG | BODY MASS INDEX: 31.8 KG/M2 | WEIGHT: 214.73 LBS | RESPIRATION RATE: 16 BRPM | HEIGHT: 69 IN | SYSTOLIC BLOOD PRESSURE: 141 MMHG

## 2024-05-29 DIAGNOSIS — E53.8 B12 DEFICIENCY: ICD-10-CM

## 2024-05-29 DIAGNOSIS — E55.9 VITAMIN D DEFICIENCY: ICD-10-CM

## 2024-05-29 DIAGNOSIS — E61.1 IRON DEFICIENCY: ICD-10-CM

## 2024-05-29 DIAGNOSIS — Z79.899 HIGH RISK MEDICATION USE: ICD-10-CM

## 2024-05-29 DIAGNOSIS — E29.1 MALE HYPOGONADISM: ICD-10-CM

## 2024-05-29 LAB
25(OH)D3 SERPL-MCNC: 76 NG/ML (ref 30–100)
ALBUMIN SERPL BCP-MCNC: 4.5 G/DL (ref 3.2–4.9)
ALBUMIN/GLOB SERPL: 1.5 G/DL
ALP SERPL-CCNC: 90 U/L (ref 30–99)
ALT SERPL-CCNC: 102 U/L (ref 2–50)
ANION GAP SERPL CALC-SCNC: 14 MMOL/L (ref 7–16)
AST SERPL-CCNC: 67 U/L (ref 12–45)
BILIRUB SERPL-MCNC: 0.4 MG/DL (ref 0.1–1.5)
BUN SERPL-MCNC: 17 MG/DL (ref 8–22)
CALCIUM ALBUM COR SERPL-MCNC: 9.1 MG/DL (ref 8.5–10.5)
CALCIUM SERPL-MCNC: 9.5 MG/DL (ref 8.5–10.5)
CHLORIDE SERPL-SCNC: 102 MMOL/L (ref 96–112)
CO2 SERPL-SCNC: 24 MMOL/L (ref 20–33)
CREAT SERPL-MCNC: 1.07 MG/DL (ref 0.5–1.4)
FERRITIN SERPL-MCNC: 119 NG/ML (ref 22–322)
GFR SERPLBLD CREATININE-BSD FMLA CKD-EPI: 82 ML/MIN/1.73 M 2
GLOBULIN SER CALC-MCNC: 3.1 G/DL (ref 1.9–3.5)
GLUCOSE SERPL-MCNC: 90 MG/DL (ref 65–99)
HCT VFR BLD AUTO: 54.2 % (ref 42–52)
HGB BLD-MCNC: 17.8 G/DL (ref 14–18)
POTASSIUM SERPL-SCNC: 4.9 MMOL/L (ref 3.6–5.5)
PROT SERPL-MCNC: 7.6 G/DL (ref 6–8.2)
SODIUM SERPL-SCNC: 140 MMOL/L (ref 135–145)
VIT B12 SERPL-MCNC: 749 PG/ML (ref 211–911)

## 2024-05-29 RX ORDER — SODIUM CHLORIDE 9 MG/ML
500 INJECTION, SOLUTION INTRAVENOUS ONCE
OUTPATIENT
Start: 2024-05-29 | End: 2024-05-29

## 2024-05-29 RX ORDER — ATROPINE SULFATE 1 MG/ML
0.5 INJECTION, SOLUTION INTRAVENOUS PRN
OUTPATIENT
Start: 2024-05-29

## 2024-05-30 NOTE — PROGRESS NOTES
Neil presents to [facility] for therapeutic phlebotomy.  PIV placed to left AC, labs collected and reviewed.   Hgb 17.8 Parameters met for TP. 500 cc whole blood removed. Neil tolerated well.  Orthostatic vital signs wnl, see flowesheet.  Neil denies s/s chest pain, shortness of breath, dizziness or light-headedness after treatment.  PIV flushed and removed, gauze and coban to site.  Neil discharged in NAD, next appointment confirmed.

## 2024-05-31 LAB
SHBG SERPL-SCNC: 15 NMOL/L (ref 19–76)
TESTOST FREE MFR SERPL: 2.7 % (ref 1.6–2.9)
TESTOST FREE SERPL-MCNC: 189 PG/ML (ref 47–244)
TESTOST SERPL-MCNC: 699 NG/DL (ref 300–890)

## 2024-06-13 ENCOUNTER — OFFICE VISIT (OUTPATIENT)
Dept: MEDICAL GROUP | Facility: PHYSICIAN GROUP | Age: 54
End: 2024-06-13
Payer: COMMERCIAL

## 2024-06-13 VITALS
OXYGEN SATURATION: 97 % | BODY MASS INDEX: 31.84 KG/M2 | HEIGHT: 69 IN | HEART RATE: 80 BPM | WEIGHT: 215 LBS | DIASTOLIC BLOOD PRESSURE: 92 MMHG | SYSTOLIC BLOOD PRESSURE: 136 MMHG | TEMPERATURE: 97.3 F

## 2024-06-13 DIAGNOSIS — I10 ESSENTIAL HYPERTENSION: ICD-10-CM

## 2024-06-13 PROCEDURE — 99214 OFFICE O/P EST MOD 30 MIN: CPT | Performed by: FAMILY MEDICINE

## 2024-06-13 PROCEDURE — 3075F SYST BP GE 130 - 139MM HG: CPT | Performed by: FAMILY MEDICINE

## 2024-06-13 PROCEDURE — 3079F DIAST BP 80-89 MM HG: CPT | Performed by: FAMILY MEDICINE

## 2024-06-13 RX ORDER — CEFDINIR 300 MG/1
CAPSULE ORAL
COMMUNITY
Start: 2024-05-20 | End: 2024-06-13

## 2024-06-13 RX ORDER — LISINOPRIL 10 MG/1
10 TABLET ORAL DAILY
Qty: 90 TABLET | Refills: 0 | Status: SHIPPED | OUTPATIENT
Start: 2024-06-13

## 2024-06-13 ASSESSMENT — PATIENT HEALTH QUESTIONNAIRE - PHQ9: CLINICAL INTERPRETATION OF PHQ2 SCORE: 0

## 2024-06-13 NOTE — PROGRESS NOTES
"Verbal consent was acquired by the patient to use [employer] ambient listening note generation during this visit     Subjective:     HPI:   History of Present Illness  The patient is a 53-year-old man here today to discuss elevated blood pressure.    The patient was previously on antihypertensive medication a few years ago, the specifics of which he can not recall. Upon discontinuation of the medication, his blood pressure remained stable for a period. However, he sustained a knee fracture a year ago and is currently undergoing physical therapy. Concurrently, he is on testosterone, which necessitates regular blood work. Over the past year, he has observed a progressive increase in his blood pressure, with occasional highs and lows. Today's reading is average, although occasionally higher. He utilizes an arm cuff for home blood pressure monitoring, ensuring consistent readings at the same time each night, typically around 9 PM. His blood pressure readings range from 120s to 150s over 70s to 90s. His average blood pressure since 02/2024 was 138/84. Despite being relatively active, he has recently begun to increase his physical activity. He has gained weight over the past year. He discontinued Aleve twice daily for a year due to concerns about potential elevated heart rate. He occasionally experiences pulsating sensation in his neck during minimal activity. He expresses concern about the potential for stroke during physical activity. He denies any chest pain, respiratory distress, fevers, or chills.    Health Maintenance: Completed    Objective:     Exam:  BP (!) 136/92 (BP Location: Left arm, Patient Position: Sitting, BP Cuff Size: Adult)   Pulse 80   Temp 36.3 °C (97.3 °F) (Temporal)   Ht 1.75 m (5' 8.9\")   Wt 97.5 kg (215 lb)   SpO2 97%   BMI 31.84 kg/m²  Body mass index is 31.84 kg/m².    Physical Exam  Constitutional:       Appearance: Normal appearance.   Cardiovascular:      Rate and Rhythm: Normal rate " and regular rhythm.      Heart sounds: Normal heart sounds.   Pulmonary:      Effort: Pulmonary effort is normal.      Breath sounds: Normal breath sounds.   Musculoskeletal:      Cervical back: Normal range of motion and neck supple.   Lymphadenopathy:      Cervical: No cervical adenopathy.   Neurological:      Mental Status: He is alert.             Results  R arm: 134/82  L arm: 136/92    Assessment & Plan:     1. Essential hypertension          Assessment & Plan  1. Hypertension.  The patient's hypertension is chronic and unstable. He was previously on a daily regimen of lisinopril 40 mg, but was able to discontinue the medication without necessitating medication for several years. However, a year ago, he sustained a knee fracture that necessitated surgery, which has resulted in a decrease in his activity levels. Over the past year, he has noticed a gradual increase in his blood pressure, as evidenced by his average blood pressure of 138/84 as measured using his blood pressure log since 02/2025. I explained that this is within the prehypertension range and does not necessitate medication at this time. However, the patient expresses concern over an increased pulse rate during exercise, which he suspects may be related to his elevated blood pressure and potential risk of stroke during exercise. Consequently, he has decided to commence a blood pressure medication. I have informed him that a blood pressure medication will not alleviate his increased pulse rate during exercise, which is likely a result of physical intolerance. I will initiate a regimen of lisinopril 10 mg daily, and he will continue to monitor his blood pressure at home daily. However, I have advised him to vary the timing of his blood pressure to ensure optimal readings throughout the day.  Of note, his diastolic blood pressures have a 10 point difference between each arm.  We will recheck this at his next visit and if it persists we may need to do  a workup for discordant blood pressures.    Return in about 4 weeks (around 7/11/2024) for F/u BP.    Please note that this dictation was created using voice recognition software. I have made every reasonable attempt to correct obvious errors, but I expect that there are errors of grammar and possibly content that I did not discover before finalizing the note.

## 2024-06-13 NOTE — Clinical Note
FYI - diastolics had a 10 point difference between arms. I recommend rechecking that at next visit and if still persists, may need work up for discordant BPs. Started him on lisinopril in the meantime.

## 2024-07-23 ENCOUNTER — OFFICE VISIT (OUTPATIENT)
Dept: MEDICAL GROUP | Facility: PHYSICIAN GROUP | Age: 54
End: 2024-07-23
Payer: COMMERCIAL

## 2024-07-23 VITALS
OXYGEN SATURATION: 97 % | WEIGHT: 210.6 LBS | HEART RATE: 81 BPM | DIASTOLIC BLOOD PRESSURE: 72 MMHG | BODY MASS INDEX: 30.15 KG/M2 | TEMPERATURE: 97.5 F | SYSTOLIC BLOOD PRESSURE: 122 MMHG | HEIGHT: 70 IN

## 2024-07-23 DIAGNOSIS — I10 ESSENTIAL HYPERTENSION: ICD-10-CM

## 2024-07-23 DIAGNOSIS — K21.9 GASTROESOPHAGEAL REFLUX DISEASE WITHOUT ESOPHAGITIS: ICD-10-CM

## 2024-07-23 DIAGNOSIS — Z12.11 SCREENING FOR COLON CANCER: ICD-10-CM

## 2024-07-23 DIAGNOSIS — R79.89 LOW TESTOSTERONE IN MALE: ICD-10-CM

## 2024-07-23 DIAGNOSIS — E29.1 HYPOGONADISM IN MALE: ICD-10-CM

## 2024-07-23 DIAGNOSIS — Z00.00 WELLNESS EXAMINATION: ICD-10-CM

## 2024-07-23 PROCEDURE — 3074F SYST BP LT 130 MM HG: CPT | Performed by: NURSE PRACTITIONER

## 2024-07-23 PROCEDURE — 3078F DIAST BP <80 MM HG: CPT | Performed by: NURSE PRACTITIONER

## 2024-07-23 PROCEDURE — 99214 OFFICE O/P EST MOD 30 MIN: CPT | Performed by: NURSE PRACTITIONER

## 2024-07-23 RX ORDER — NEEDLES, DISPOSABLE 25GX5/8"
1 NEEDLE, DISPOSABLE MISCELLANEOUS
Qty: 50 EACH | Refills: 1 | Status: SHIPPED | OUTPATIENT
Start: 2024-07-23

## 2024-07-23 RX ORDER — LISINOPRIL 10 MG/1
10 TABLET ORAL DAILY
Qty: 90 TABLET | Refills: 3 | Status: SHIPPED | OUTPATIENT
Start: 2024-07-23

## 2024-07-30 ENCOUNTER — OFFICE VISIT (OUTPATIENT)
Dept: ENDOCRINOLOGY | Facility: MEDICAL CENTER | Age: 54
End: 2024-07-30
Attending: INTERNAL MEDICINE
Payer: COMMERCIAL

## 2024-07-30 VITALS
HEART RATE: 104 BPM | BODY MASS INDEX: 30.21 KG/M2 | DIASTOLIC BLOOD PRESSURE: 78 MMHG | SYSTOLIC BLOOD PRESSURE: 124 MMHG | OXYGEN SATURATION: 95 % | HEIGHT: 70 IN | WEIGHT: 211 LBS

## 2024-07-30 DIAGNOSIS — E55.9 VITAMIN D DEFICIENCY: ICD-10-CM

## 2024-07-30 DIAGNOSIS — E53.8 B12 DEFICIENCY: ICD-10-CM

## 2024-07-30 DIAGNOSIS — Z79.899 HIGH RISK MEDICATION USE: ICD-10-CM

## 2024-07-30 DIAGNOSIS — E29.1 MALE HYPOGONADISM: ICD-10-CM

## 2024-07-30 DIAGNOSIS — E61.1 IRON DEFICIENCY: ICD-10-CM

## 2024-07-30 DIAGNOSIS — R30.0 DYSURIA: ICD-10-CM

## 2024-07-30 DIAGNOSIS — R74.8 ELEVATED LIVER ENZYMES: ICD-10-CM

## 2024-07-30 PROCEDURE — 99211 OFF/OP EST MAY X REQ PHY/QHP: CPT | Performed by: INTERNAL MEDICINE

## 2024-07-30 RX ORDER — TESTOSTERONE CYPIONATE 200 MG/ML
90 INJECTION, SOLUTION INTRAMUSCULAR
Qty: 4 ML | Refills: 5 | Status: SHIPPED | OUTPATIENT
Start: 2024-07-30 | End: 2024-08-27

## 2024-07-31 ENCOUNTER — OUTPATIENT INFUSION SERVICES (OUTPATIENT)
Dept: ONCOLOGY | Facility: MEDICAL CENTER | Age: 54
End: 2024-07-31
Attending: INTERNAL MEDICINE
Payer: COMMERCIAL

## 2024-07-31 VITALS
HEART RATE: 84 BPM | DIASTOLIC BLOOD PRESSURE: 91 MMHG | SYSTOLIC BLOOD PRESSURE: 139 MMHG | TEMPERATURE: 97.2 F | HEIGHT: 69 IN | RESPIRATION RATE: 18 BRPM | WEIGHT: 211.86 LBS | BODY MASS INDEX: 31.38 KG/M2 | OXYGEN SATURATION: 96 %

## 2024-07-31 DIAGNOSIS — E29.1 MALE HYPOGONADISM: ICD-10-CM

## 2024-07-31 DIAGNOSIS — Z00.00 WELLNESS EXAMINATION: ICD-10-CM

## 2024-07-31 LAB
CHOLEST SERPL-MCNC: 165 MG/DL (ref 100–199)
HCT VFR BLD AUTO: 53.1 % (ref 42–52)
HDLC SERPL-MCNC: 43 MG/DL
HGB BLD-MCNC: 18.2 G/DL (ref 14–18)
LDLC SERPL CALC-MCNC: 56 MG/DL
TRIGL SERPL-MCNC: 331 MG/DL (ref 0–149)

## 2024-07-31 PROCEDURE — 99195 PHLEBOTOMY: CPT

## 2024-07-31 RX ORDER — SODIUM CHLORIDE 9 MG/ML
500 INJECTION, SOLUTION INTRAVENOUS ONCE
OUTPATIENT
Start: 2024-07-31 | End: 2024-07-31

## 2024-07-31 RX ORDER — ATROPINE SULFATE 1 MG/ML
0.5 INJECTION, SOLUTION INTRAVENOUS PRN
OUTPATIENT
Start: 2024-07-31

## 2024-09-15 ENCOUNTER — PATIENT MESSAGE (OUTPATIENT)
Dept: ENDOCRINOLOGY | Facility: MEDICAL CENTER | Age: 54
End: 2024-09-15
Payer: COMMERCIAL

## 2024-09-19 ENCOUNTER — TELEPHONE (OUTPATIENT)
Dept: ENDOCRINOLOGY | Facility: MEDICAL CENTER | Age: 54
End: 2024-09-19
Payer: COMMERCIAL

## 2024-09-19 DIAGNOSIS — I10 ESSENTIAL HYPERTENSION: ICD-10-CM

## 2024-09-19 RX ORDER — LISINOPRIL 10 MG/1
10 TABLET ORAL DAILY
Qty: 90 TABLET | Refills: 3 | Status: SHIPPED | OUTPATIENT
Start: 2024-09-19

## 2024-09-19 NOTE — TELEPHONE ENCOUNTER
Received Renewal PA request via  EMR Message for Testosterone Cypionate 200mg/mL IM Sol. (Quantity:4mL, Day Supply:28)  Insurance: [employer]  Member ID: [id number]  BIN: [id number]  PCN: [id number]  Group: [id number]   Ran Test claim via [employer] & medication  did not reject.    Prior Authorization has been submitted via Cover My Meds: Key ([id number])    Prior Authorization has been APPROVED  Prior Authorization reference number: [id number]  Effective dates: 9/19/2024-9/19/2025  Copay: $7.50  Is patient eligible to fill with [facility]? Yes  Next Steps:  Advised patient of approval via [employer] Message. Patient is currently filling prescription at preferred pharmacy: [id number].    Thank you,  Chely Hernandez, [facility]  Endocrinology Pharmacy Coordinator

## 2024-09-25 ENCOUNTER — APPOINTMENT (OUTPATIENT)
Dept: RADIOLOGY | Facility: IMAGING CENTER | Age: 54
End: 2024-09-25
Attending: PHYSICIAN ASSISTANT
Payer: COMMERCIAL

## 2024-09-25 ENCOUNTER — OFFICE VISIT (OUTPATIENT)
Dept: URGENT CARE | Facility: CLINIC | Age: 54
End: 2024-09-25
Payer: COMMERCIAL

## 2024-09-25 VITALS
HEART RATE: 83 BPM | HEIGHT: 68 IN | OXYGEN SATURATION: 99 % | BODY MASS INDEX: 30.77 KG/M2 | SYSTOLIC BLOOD PRESSURE: 150 MMHG | WEIGHT: 203 LBS | TEMPERATURE: 97.2 F | DIASTOLIC BLOOD PRESSURE: 86 MMHG | RESPIRATION RATE: 16 BRPM

## 2024-09-25 DIAGNOSIS — T14.8XXA MUSCLE CONTUSION: ICD-10-CM

## 2024-09-25 DIAGNOSIS — S59.901A INJURY OF RIGHT ELBOW, INITIAL ENCOUNTER: ICD-10-CM

## 2024-09-25 PROCEDURE — 99213 OFFICE O/P EST LOW 20 MIN: CPT | Performed by: PHYSICIAN ASSISTANT

## 2024-09-25 PROCEDURE — 73080 X-RAY EXAM OF ELBOW: CPT | Mod: TC,RT | Performed by: RADIOLOGY

## 2024-09-25 PROCEDURE — 3077F SYST BP >= 140 MM HG: CPT | Performed by: PHYSICIAN ASSISTANT

## 2024-09-25 PROCEDURE — 3079F DIAST BP 80-89 MM HG: CPT | Performed by: PHYSICIAN ASSISTANT

## 2024-09-25 ASSESSMENT — ENCOUNTER SYMPTOMS
TINGLING: 0
MYALGIAS: 1
WEAKNESS: 0

## 2024-09-26 NOTE — PROGRESS NOTES
"  Subjective:     Neil Abdi Jr.  is a 54 y.o. male who presents for Arm Injury (Right arm was hit by a metal/wood mid evil shield, right arm completely bruised, Pt requesting xray x 5 days)       He presents today after suffering a right upper extremity injury 5 days ago.  States that he was participating in a full contact medieval fighting activity when he was struck in the right anterior elbow with a metal or wood shield.  States that immediately following the impact he experienced a swollen firm area over his distal bicep.  He did awake the next day to a localized area of swelling over the antecubital fossa.  His symptoms have worsened and he is now having bruising from his middle bicep extending all the way down to his volar wrist.  Areas of most significant pain are over the anterior elbow and distal bicep with pain radiating down into the forearm and up into the shoulder.  Symptoms are worsened with movement but do occur at rest.  He is able to fully perform elbow flexion, extension, forearm pronation supination and wrist movements.       Review of Systems   Musculoskeletal:  Positive for joint pain and myalgias.        Ecchymosis over the right upper extremity   Neurological:  Negative for tingling and weakness.      Allergies   Allergen Reactions    Bee      Past Medical History:   Diagnosis Date    Anxiety     Asthma     GERD (gastroesophageal reflux disease)     Hypertension     Migraine         Objective:   BP (!) 150/86 (BP Location: Left arm, Patient Position: Sitting, BP Cuff Size: Adult)   Pulse 83   Temp 36.2 °C (97.2 °F) (Temporal)   Resp 16   Ht 1.735 m (5' 8.3\")   Wt 92.1 kg (203 lb)   SpO2 99%   BMI 30.60 kg/m²   Physical Exam  Vitals and nursing note reviewed.   Constitutional:       General: He is not in acute distress.     Appearance: He is not ill-appearing or toxic-appearing.   HENT:      Head: Normocephalic.      Nose: No rhinorrhea.   Eyes:      General: No scleral " Verbal orders given to Erin James icterus.     Conjunctiva/sclera: Conjunctivae normal.   Pulmonary:      Effort: Pulmonary effort is normal. No respiratory distress.      Breath sounds: No stridor.   Musculoskeletal:        Arms:       Cervical back: Neck supple.      Comments: Examination of the right upper extremity does reveal ecchymosis the above red lines.  He does have generalized tenderness over the area of ecchymosis but there is focal worsened pain with tenderness over the antecubital fossa and distal bicep.  Finger and  strength are within normal limits and comparable bilaterally, nonpainful.  Patient is able to perform elbow flexion and extension but they are painful.  Pronation and supination normal   Neurological:      Mental Status: He is alert and oriented to person, place, and time.   Psychiatric:         Mood and Affect: Mood normal.         Behavior: Behavior normal.         Thought Content: Thought content normal.         Judgment: Judgment normal.             Diagnostic testing:    Right elbow x-ray series  Radiologist IMPRESSION:     No evidence of acute fracture or dislocation.    Assessment/Plan:     Encounter Diagnoses   Name Primary?    Muscle contusion     Injury of right elbow, initial encounter           Plan for care for today's complaint includes performing right elbow x-ray series which was negative for acute bony pathology.  Discussed with the patient these findings, I am suspicious of a muscle contusion due to the nature of injury, development of muscle discomfort and extent of the ecchymosis over the right upper extremity.  Encouraged anti-inflammatory medications and Tylenol for pain relief.  Ice and heat as needed.  Modify activities as tolerated.   continue to monitor symptoms and return to urgent care or follow-up with primary care provider if symptoms remain ongoing.  Follow-up in the emergency department if symptoms become severe, ER precautions discussed in office today..    See AVS Instructions below  for written guidance provided to patient on after-visit management and care in addition to our verbal discussion during the visit.    Please note that this dictation was created using voice recognition software. I have attempted to correct all errors, but there may be sound-alike, spelling, grammar and possibly content errors that I did not discover before finalizing the note.    Roque Rich PA-C

## 2024-09-28 ENCOUNTER — HOSPITAL ENCOUNTER (OUTPATIENT)
Dept: RADIOLOGY | Facility: MEDICAL CENTER | Age: 54
End: 2024-09-28
Attending: PHYSICIAN ASSISTANT
Payer: COMMERCIAL

## 2024-09-28 DIAGNOSIS — M25.511 ACUTE PAIN OF RIGHT SHOULDER: ICD-10-CM

## 2024-09-28 DIAGNOSIS — S43.101A AC SEPARATION, RIGHT, INITIAL ENCOUNTER: ICD-10-CM

## 2024-09-28 DIAGNOSIS — S46.211A BICEPS RUPTURE, DISTAL, RIGHT, INITIAL ENCOUNTER: ICD-10-CM

## 2024-09-28 DIAGNOSIS — M25.521 RIGHT ELBOW PAIN: ICD-10-CM

## 2024-09-28 PROCEDURE — 73221 MRI JOINT UPR EXTREM W/O DYE: CPT | Mod: RT

## 2024-10-16 ENCOUNTER — OUTPATIENT INFUSION SERVICES (OUTPATIENT)
Dept: ONCOLOGY | Facility: MEDICAL CENTER | Age: 54
End: 2024-10-16
Attending: INTERNAL MEDICINE
Payer: COMMERCIAL

## 2024-10-16 VITALS
HEIGHT: 69 IN | DIASTOLIC BLOOD PRESSURE: 106 MMHG | SYSTOLIC BLOOD PRESSURE: 141 MMHG | BODY MASS INDEX: 30.73 KG/M2 | RESPIRATION RATE: 18 BRPM | HEART RATE: 108 BPM | WEIGHT: 207.45 LBS | OXYGEN SATURATION: 97 % | TEMPERATURE: 96.9 F

## 2024-10-16 DIAGNOSIS — E29.1 MALE HYPOGONADISM: ICD-10-CM

## 2024-10-16 LAB
HCT VFR BLD AUTO: 56.3 % (ref 42–52)
HGB BLD-MCNC: 19.4 G/DL (ref 14–18)

## 2024-10-16 PROCEDURE — 85014 HEMATOCRIT: CPT

## 2024-10-16 PROCEDURE — 99195 PHLEBOTOMY: CPT

## 2024-10-16 PROCEDURE — 85018 HEMOGLOBIN: CPT

## 2024-10-16 RX ORDER — SODIUM CHLORIDE 9 MG/ML
500 INJECTION, SOLUTION INTRAVENOUS ONCE
OUTPATIENT
Start: 2024-10-16 | End: 2024-10-16

## 2024-10-16 RX ORDER — ATROPINE SULFATE 1 MG/ML
0.5 INJECTION, SOLUTION INTRAVENOUS PRN
OUTPATIENT
Start: 2024-10-16

## 2025-01-06 ENCOUNTER — TELEPHONE (OUTPATIENT)
Dept: ENDOCRINOLOGY | Facility: MEDICAL CENTER | Age: 55
End: 2025-01-06
Payer: COMMERCIAL

## 2025-01-06 RX ORDER — ATROPINE SULFATE 1 MG/ML
0.5 INJECTION, SOLUTION INTRAVENOUS PRN
Status: CANCELLED | OUTPATIENT
Start: 2025-01-06

## 2025-01-06 RX ORDER — SODIUM CHLORIDE 9 MG/ML
500 INJECTION, SOLUTION INTRAVENOUS ONCE
Status: CANCELLED | OUTPATIENT
Start: 2025-01-06 | End: 2025-01-06

## 2025-01-07 PROBLEM — S43.431A LABRAL TEAR OF SHOULDER, RIGHT, INITIAL ENCOUNTER: Status: ACTIVE | Noted: 2025-01-07

## 2025-01-07 PROBLEM — M75.110 PARTIAL THICKNESS ROTATOR CUFF TEAR: Status: ACTIVE | Noted: 2025-01-07

## 2025-01-07 PROBLEM — M75.21 BICEPS TENDINITIS OF RIGHT SHOULDER: Status: ACTIVE | Noted: 2025-01-07

## 2025-01-07 PROBLEM — M75.41 SUBACROMIAL IMPINGEMENT OF RIGHT SHOULDER: Status: ACTIVE | Noted: 2025-01-07

## 2025-01-15 ENCOUNTER — OUTPATIENT INFUSION SERVICES (OUTPATIENT)
Dept: ONCOLOGY | Facility: MEDICAL CENTER | Age: 55
End: 2025-01-15
Attending: INTERNAL MEDICINE
Payer: COMMERCIAL

## 2025-01-15 VITALS
TEMPERATURE: 97 F | DIASTOLIC BLOOD PRESSURE: 94 MMHG | RESPIRATION RATE: 18 BRPM | HEIGHT: 69 IN | WEIGHT: 207.23 LBS | HEART RATE: 82 BPM | BODY MASS INDEX: 30.69 KG/M2 | SYSTOLIC BLOOD PRESSURE: 144 MMHG | OXYGEN SATURATION: 98 %

## 2025-01-15 DIAGNOSIS — R74.8 ELEVATED LIVER ENZYMES: ICD-10-CM

## 2025-01-15 DIAGNOSIS — R30.0 DYSURIA: ICD-10-CM

## 2025-01-15 DIAGNOSIS — E55.9 VITAMIN D DEFICIENCY: ICD-10-CM

## 2025-01-15 DIAGNOSIS — Z79.899 HIGH RISK MEDICATION USE: ICD-10-CM

## 2025-01-15 DIAGNOSIS — E29.1 MALE HYPOGONADISM: ICD-10-CM

## 2025-01-15 DIAGNOSIS — E53.8 B12 DEFICIENCY: ICD-10-CM

## 2025-01-15 DIAGNOSIS — E61.1 IRON DEFICIENCY: ICD-10-CM

## 2025-01-15 LAB
25(OH)D3 SERPL-MCNC: 64 NG/ML (ref 30–100)
ALBUMIN SERPL BCP-MCNC: 4.7 G/DL (ref 3.2–4.9)
ALBUMIN/GLOB SERPL: 1.5 G/DL
ALP SERPL-CCNC: 81 U/L (ref 30–99)
ALT SERPL-CCNC: 89 U/L (ref 2–50)
ANION GAP SERPL CALC-SCNC: 14 MMOL/L (ref 7–16)
AST SERPL-CCNC: 46 U/L (ref 12–45)
BILIRUB SERPL-MCNC: 0.5 MG/DL (ref 0.1–1.5)
BUN SERPL-MCNC: 16 MG/DL (ref 8–22)
CALCIUM ALBUM COR SERPL-MCNC: 9.2 MG/DL (ref 8.5–10.5)
CALCIUM SERPL-MCNC: 9.8 MG/DL (ref 8.5–10.5)
CHLORIDE SERPL-SCNC: 103 MMOL/L (ref 96–112)
CO2 SERPL-SCNC: 24 MMOL/L (ref 20–33)
CREAT SERPL-MCNC: 1.27 MG/DL (ref 0.5–1.4)
FERRITIN SERPL-MCNC: 24.9 NG/ML (ref 22–322)
GFR SERPLBLD CREATININE-BSD FMLA CKD-EPI: 67 ML/MIN/1.73 M 2
GLOBULIN SER CALC-MCNC: 3.1 G/DL (ref 1.9–3.5)
GLUCOSE SERPL-MCNC: 91 MG/DL (ref 65–99)
HCT VFR BLD AUTO: 55.3 % (ref 42–52)
HGB BLD-MCNC: 18.4 G/DL (ref 14–18)
POTASSIUM SERPL-SCNC: 4.2 MMOL/L (ref 3.6–5.5)
PROT SERPL-MCNC: 7.8 G/DL (ref 6–8.2)
PSA SERPL DL<=0.01 NG/ML-MCNC: 1.9 NG/ML (ref 0–4)
SODIUM SERPL-SCNC: 141 MMOL/L (ref 135–145)
T4 FREE SERPL-MCNC: 1.17 NG/DL (ref 0.93–1.7)
TSH SERPL-ACNC: 2.43 UIU/ML (ref 0.35–5.5)

## 2025-01-15 PROCEDURE — 84402 ASSAY OF FREE TESTOSTERONE: CPT

## 2025-01-15 PROCEDURE — 99195 PHLEBOTOMY: CPT

## 2025-01-15 PROCEDURE — 84443 ASSAY THYROID STIM HORMONE: CPT

## 2025-01-15 PROCEDURE — 85018 HEMOGLOBIN: CPT

## 2025-01-15 PROCEDURE — 80053 COMPREHEN METABOLIC PANEL: CPT

## 2025-01-15 PROCEDURE — 84270 ASSAY OF SEX HORMONE GLOBUL: CPT

## 2025-01-15 PROCEDURE — 82306 VITAMIN D 25 HYDROXY: CPT

## 2025-01-15 PROCEDURE — 82728 ASSAY OF FERRITIN: CPT

## 2025-01-15 PROCEDURE — 85014 HEMATOCRIT: CPT

## 2025-01-15 PROCEDURE — 84153 ASSAY OF PSA TOTAL: CPT

## 2025-01-15 PROCEDURE — 84439 ASSAY OF FREE THYROXINE: CPT

## 2025-01-15 PROCEDURE — 84403 ASSAY OF TOTAL TESTOSTERONE: CPT

## 2025-01-15 RX ORDER — ATROPINE SULFATE 1 MG/ML
0.5 INJECTION, SOLUTION INTRAVENOUS PRN
OUTPATIENT
Start: 2025-01-15

## 2025-01-15 RX ORDER — SODIUM CHLORIDE 9 MG/ML
500 INJECTION, SOLUTION INTRAVENOUS ONCE
OUTPATIENT
Start: 2025-01-15 | End: 2025-01-15

## 2025-01-15 NOTE — PROGRESS NOTES
Neil arrives for the therapeutic phlebotomy. Labs drawn as ordered by MD.  Neil's Hgb 18.4, Hct 55.3 %. 500 cc blood removed. Neil tolerated well. Orthostatic vitals stable, see flowsheet. Neil denies chest pain, shortness of breath, dizziness, or lightheadedness after treatment. Message sent to Schedulers to schedule next appointment. Discharged to self care; no apparent distress noted.

## 2025-01-17 LAB
SHBG SERPL-SCNC: 14 NMOL/L (ref 19–76)
TESTOST FREE MFR SERPL: 2.7 % (ref 1.6–2.9)
TESTOST FREE SERPL-MCNC: 146 PG/ML (ref 47–244)
TESTOST SERPL-MCNC: 543 NG/DL (ref 300–890)

## 2025-01-30 ENCOUNTER — TELEMEDICINE (OUTPATIENT)
Dept: ENDOCRINOLOGY | Facility: MEDICAL CENTER | Age: 55
End: 2025-01-30
Attending: INTERNAL MEDICINE
Payer: COMMERCIAL

## 2025-01-30 VITALS — BODY MASS INDEX: 30.06 KG/M2 | WEIGHT: 210 LBS | HEIGHT: 70 IN

## 2025-01-30 DIAGNOSIS — R74.8 ELEVATED LIVER ENZYMES: ICD-10-CM

## 2025-01-30 DIAGNOSIS — Z12.5 SCREENING FOR PROSTATE CANCER: ICD-10-CM

## 2025-01-30 DIAGNOSIS — E61.1 IRON DEFICIENCY: ICD-10-CM

## 2025-01-30 DIAGNOSIS — E29.1 HYPOGONADISM IN MALE: ICD-10-CM

## 2025-01-30 DIAGNOSIS — Z79.899 HIGH RISK MEDICATION USE: ICD-10-CM

## 2025-01-30 DIAGNOSIS — E55.9 VITAMIN D DEFICIENCY: ICD-10-CM

## 2025-01-30 RX ORDER — TESTOSTERONE CYPIONATE 200 MG/ML
90 INJECTION, SOLUTION INTRAMUSCULAR
Qty: 4 ML | Refills: 5 | Status: SHIPPED | OUTPATIENT
Start: 2025-01-30 | End: 2025-02-27

## 2025-01-30 NOTE — PROGRESS NOTES
Chief Complaint: Follow up for Secondary Hypogonadism   Patient was presented for a telehealth consultation via secure and encrypted videoconferencing technology.   This encounter was conducted via Zoom . Verbal consent was obtained. Patient's identity was verified.    Virtual visit consent       This evaluation was conducted via Zoom using secure and encrypted videoconferencing technology.   The patient was (home or other private:[id number]) in the [facility].   The patient's identity was confirmed and verbal consent was obtained for this virtual visit.     This evaluation was conducted via Teams using secure and encrypted videoconferencing technology. The patient was in their home in the [facility].    The patient's identity was confirmed and verbal consent was obtained for this virtual visit.      HPI:     Neil Abdi JrChad is a 54 y.o. male here for follow up of Hypogonadism         Since last visit patient reports feeling fair    He remains on Testosterone cypionate 90 mg every 7 days  which has been his dose for the past 18 months.     He reports excellent compliance and denies missing any weekly or biweekly doses.     He gives blood regularly every 6 to 8 weeks as vital and  He feels better when his total testosterone ranges between 550-600  He currently denies fatigue,depression, loss of libido, erectile dysfunction.    He currently denies decreased stream, hesitancy, intermittency and post void dribbling.      He had arthroscopic surgery for R shoulder  impingement syndrome with distal clavicle resection on 1/2025        His most recent labs show      Latest Reference Range & Units 01/15/25 14:53   Free Testosterone 47 - 244 pg/mL 146   Sex Hormone Bind Globulin 19 - 76 nmol/L 14 (L)   Testosterone % Free 1.6 - 2.9 % 2.7   Testosterone,Total 300 - 890 ng/dL 543      Latest Reference Range & Units 01/15/25 14:53   25-Hydroxy   Vitamin D 25 30 - 100 ng/mL 64   Ferritin 22.0 - 322.0 ng/mL 24.9    Prostatic Specific Antigen Tot 0.00 - 4.00 ng/mL 1.90      Latest Reference Range & Units 01/15/25 14:53   Hemoglobin 14.0 - 18.0 g/dL 18.4 (H)   Hematocrit 42.0 - 52.0 % 55.3 (H)         Prior labs:  Testosterone was 699 on 5/2024    Testosterone was 590 and Hct was 50% on Nov 2023    testosterone was 508 on 5/2023  Hct was 51%  Prior to this total testosterone  was 656 on 3/2023  He gets regular phlebotomy every 8 to 12 weeks at [facility]      PSA was 1.67  Vitamin D was 57 on 3/2023  PTH is normal   B12 was 1191                Patient's medications, allergies, and social histories were reviewed and updated as appropriate.      ROS:       CONS:     No fever, no chills   EYES:     No diplopia, no blurry vision   CV:           No chest pain, no palpitations   PULM:     No SOB, no cough, no hemoptysis.   GI:            No nausea, no vomiting, no diarrhea, no constipation   ENDO:     No polyuria, no polydipsia, no heat intolerance, no cold intolerance       Past Medical History:  Problem List:  2025-01: Subacromial impingement of right shoulder  2025-01: Biceps tendinitis of right shoulder  2025-01: Labral tear of shoulder, right, initial encounter  2025-01: Partial thickness rotator cuff tear  2024-01: Iron deficiency  2023-05: Complete tear of right ACL  2023-05: Tear of medial meniscus of right knee, current, subsequent   encounter  2022-01: Secondary hyperparathyroidism (HCC)  2022-01: Vitamin D deficiency  2022-01: High risk medication use  2021-02: Anxiety  2020-09: Male hypogonadism  2018-03: Lump in neck  2017-03: Acne vulgaris  2017-03: Chronic gout involving toe of left foot without tophus  2017-03: Mixed hyperlipidemia  2017-02: Essential hypertension  2017-02: Gastroesophageal reflux disease without esophagitis  2017-02: Multiple allergies  2017-02: Low testosterone in male  2017-02: Exercise-induced asthma      Past Surgical History:  Past Surgical History:   Procedure Laterality Date    NINFA CLARK  "ARTHROSCOP EXTEN DEBRIDE 3+ Right 2025    Procedure: RIGHT SHOULDER ARTHROSCOPY ROTATOR CUFF DEBRIDEMENT, RIGHT SUBACROMIAL DECOMPRESSION, RIGHT LABRAL DEBRIDEMENT, POSSIBLE RIGHT BICEPS TENODESIS, DISTAL CLAVICLE EXCISION, REPAIRS AS INDICATED;  Surgeon: Shoaib Wiseman M.D.;  Location: [facility];  Service: Orthopedics    PB KNEE SCOPE,AID ANT CRUCIATE REPAIR Right 2023    Procedure: RIGHT KNEE ARTHROSCOPY, ANTERIOR CRUCIATE LIGAMENT RECONSTRUCTION WITH ALLOGRAFT;  Surgeon: Shen Simeon M.D.;  Location: [facility];  Service: Orthopedics    PB KNEE SCOPE,MED/LAT MENISECTOMY Right 2023    Procedure: RIGHT PARTIAL MEDIAL MENISCECTOMY VERSUS;  Surgeon: Shen Simeon M.D.;  Location: [facility];  Service: Orthopedics    PB KNEE SCOPE,MED OR LAT MENIS REPAIR Right 2023    Procedure: RIGHT MEDIAL MENISCUS REPAIR, REPAIRS AS INDICATED;  Surgeon: Shen Simeon M.D.;  Location: [facility];  Service: Orthopedics    LAMINOTOMY          Allergies:  Patient has no known allergies.     Social History:  Social History     Tobacco Use    Smoking status: Former     Current packs/day: 0.00     Types: Cigarettes     Quit date: 2002     Years since quittin.5    Smokeless tobacco: Former   Vaping Use    Vaping status: Never Used   Substance Use Topics    Alcohol use: Yes     Alcohol/week: 1.8 oz     Types: 1 Glasses of wine, 1 Cans of beer, 1 Shots of liquor per week     Comment: couple drinks daily    Drug use: No        Family History:   family history includes Cancer in his father, maternal grandfather, mother, paternal grandfather, and paternal uncle; No Known Problems in his sister, sister, and sister.        PHYSICAL EXAM:   Vital signs: Ht 1.778 m (5' 10\")   Wt 95.3 kg (210 lb)   BMI 30.13 kg/m²   GENERAL: Well-developed, well-nourished in no apparent distress.   EYE:  No ocular asymmetry, " PERRLA  HENT: Pink, moist mucous membranes.     CHEST: no gynecomastia  CARDIOVASCULAR:  No murmurs  LUNGS: Clear breath sounds  ABDOMEN: Soft, nontender   GENIT: deferred  EXTREMITIES: No clubbing, cyanosis, or edema.   NEUROLOGICAL: No gross focal motor abnormalities. No visible tremor, no proximal muscle weakness   LYMPH: No cervical adenopathy palpated.   SKIN: No rashes, lesions.       Labs:  Lab Results   Component Value Date/Time    WBC 7.7 06/12/2020 09:20 AM    RBC 6.14 (H) 06/12/2020 09:20 AM    HEMOGLOBIN 18.4 (H) 01/15/2025 02:53 PM    MCV 86.6 06/12/2020 09:20 AM    MCH 28.7 06/12/2020 09:20 AM    MCHC 33.1 (L) 06/12/2020 09:20 AM    RDW 43.7 06/12/2020 09:20 AM    MPV 10.7 06/12/2020 09:20 AM       Lab Results   Component Value Date/Time    SODIUM 141 01/15/2025 02:53 PM    POTASSIUM 4.2 01/15/2025 02:53 PM    CHLORIDE 103 01/15/2025 02:53 PM    CO2 24 01/15/2025 02:53 PM    ANION 14.0 01/15/2025 02:53 PM    GLUCOSE 91 01/15/2025 02:53 PM    BUN 16 01/15/2025 02:53 PM    CREATININE 1.27 01/15/2025 02:53 PM    CALCIUM 9.8 01/15/2025 02:53 PM    ASTSGOT 46 (H) 01/15/2025 02:53 PM    ALTSGPT 89 (H) 01/15/2025 02:53 PM    TBILIRUBIN 0.5 01/15/2025 02:53 PM    ALBUMIN 4.7 01/15/2025 02:53 PM    TOTPROTEIN 7.8 01/15/2025 02:53 PM    GLOBULIN 3.1 01/15/2025 02:53 PM    AGRATIO 1.5 01/15/2025 02:53 PM       No results found for: LH    Lab Results   Component Value Date/Time    FSH 0.5 (L) 03/07/2018 0628       Lab Results   Component Value Date/Time    TESTOSTERONE 643 06/02/2021 1630           Imaging:    ASSESSMENT/PLAN:      1. Male hypogonadism  Controlled  He prefers to keep his testosterone around 550-600  He is meeting this goal   Continue IM Testosterone 90 mg every 7 days  I refilled his prescription  Continue phlebotomy every 6-8 weeks at [facility]   Controlled substance agreement form was signed  Follow-up in 6 months with repeat of testosterone and hematocrit levels  I will check a PSA with his next  labs      2. Vitamin D deficiency  Controlled   continue  vitamin D3 5000 units daily  Continue monitoring    4. B12 deficiency  Controlled   Continue B12 supplements  Continue monitoring    5. Iron deficiency  Unstable ferritin is lower   he is going to restart iron supplements      6. High risk medication use  Patient is taking testosterone which is a high risk medication    7. Elevated liver enzymes  His liver enzymes are trending down continue monitoring      Return in about 6 months (around 7/30/2025).      Thank you kindly for allowing me to participate in the endocrine care plan for this patient.    Shen Boyle MD, FACE, [facility]      CC:   Oswald Stinson, IRENE.

## 2025-04-07 DIAGNOSIS — I10 ESSENTIAL HYPERTENSION: ICD-10-CM

## 2025-04-08 RX ORDER — LISINOPRIL 10 MG/1
10 TABLET ORAL DAILY
Qty: 90 TABLET | Refills: 0 | Status: SHIPPED | OUTPATIENT
Start: 2025-04-08

## 2025-04-08 NOTE — TELEPHONE ENCOUNTER
Received request via: Patient    Was the patient seen in the last year in this department? Yes    Does the patient have an active prescription (recently filled or refills available) for medication(s) requested? No    Pharmacy Name:   [id number] - [place], [place] - [address] AT [facility] [address]   Does the patient have FDC Plus and need 100-day supply? (This applies to ALL medications) Patient does not have SCP

## 2025-04-09 ENCOUNTER — OUTPATIENT INFUSION SERVICES (OUTPATIENT)
Dept: ONCOLOGY | Facility: MEDICAL CENTER | Age: 55
End: 2025-04-09
Attending: INTERNAL MEDICINE
Payer: COMMERCIAL

## 2025-04-09 VITALS
HEART RATE: 92 BPM | TEMPERATURE: 96.8 F | HEIGHT: 69 IN | DIASTOLIC BLOOD PRESSURE: 99 MMHG | BODY MASS INDEX: 31.67 KG/M2 | OXYGEN SATURATION: 99 % | SYSTOLIC BLOOD PRESSURE: 154 MMHG | RESPIRATION RATE: 18 BRPM | WEIGHT: 213.85 LBS

## 2025-04-09 DIAGNOSIS — E29.1 MALE HYPOGONADISM: ICD-10-CM

## 2025-04-09 LAB
FERRITIN SERPL-MCNC: 36.1 NG/ML (ref 22–322)
HCT VFR BLD AUTO: 55.2 % (ref 42–52)
HGB BLD-MCNC: 17.9 G/DL (ref 14–18)

## 2025-04-09 PROCEDURE — 85014 HEMATOCRIT: CPT

## 2025-04-09 PROCEDURE — 99195 PHLEBOTOMY: CPT

## 2025-04-09 PROCEDURE — 85018 HEMOGLOBIN: CPT

## 2025-04-09 PROCEDURE — 82728 ASSAY OF FERRITIN: CPT

## 2025-04-09 RX ORDER — SODIUM CHLORIDE 9 MG/ML
500 INJECTION, SOLUTION INTRAVENOUS ONCE
OUTPATIENT
Start: 2025-04-09 | End: 2025-04-09

## 2025-04-09 RX ORDER — ATROPINE SULFATE 1 MG/ML
0.5 INJECTION, SOLUTION INTRAVENOUS PRN
OUTPATIENT
Start: 2025-04-09

## 2025-04-09 NOTE — PROGRESS NOTES
Pt presented to Infusion for Therapeutic Phlebotomy. POC discussed and pt verbalized understanding. PIV placed without difficulty in the left AC; brisk blood return noted, line flushes with ease and labs drawn. Hgb 17.9 and Hct 55.2. Pt meets parameters for phlebotomy. 500ml whole blood removed per [facility] policy. Pt observed for 10 to 15 mins. Orthostatic VS taken and pt within defined parameters to discharge home. Pt denies s/s of syncope, dizziness, lightheadedness, chest pain, and reports feels well enough to discharge home. Educated pt on when to seek provider evaluation for symptoms and pt verbalized understanding. PIV flushed, removed with tip intact and site covered with sterile gauze/coban. Pt confirmed next appt and discharged to self care. Pt in NAD at time of discharge.

## 2025-07-02 ENCOUNTER — OUTPATIENT INFUSION SERVICES (OUTPATIENT)
Dept: ONCOLOGY | Facility: MEDICAL CENTER | Age: 55
End: 2025-07-02
Attending: INTERNAL MEDICINE
Payer: COMMERCIAL

## 2025-07-02 VITALS
OXYGEN SATURATION: 98 % | DIASTOLIC BLOOD PRESSURE: 90 MMHG | SYSTOLIC BLOOD PRESSURE: 146 MMHG | WEIGHT: 215.83 LBS | BODY MASS INDEX: 31.97 KG/M2 | RESPIRATION RATE: 18 BRPM | TEMPERATURE: 97.6 F | HEART RATE: 85 BPM | HEIGHT: 69 IN

## 2025-07-02 DIAGNOSIS — Z79.899 HIGH RISK MEDICATION USE: ICD-10-CM

## 2025-07-02 DIAGNOSIS — E55.9 VITAMIN D DEFICIENCY: ICD-10-CM

## 2025-07-02 DIAGNOSIS — R74.8 ELEVATED LIVER ENZYMES: ICD-10-CM

## 2025-07-02 DIAGNOSIS — Z12.5 SCREENING FOR PROSTATE CANCER: ICD-10-CM

## 2025-07-02 DIAGNOSIS — E29.1 HYPOGONADISM IN MALE: ICD-10-CM

## 2025-07-02 DIAGNOSIS — E29.1 MALE HYPOGONADISM: Primary | ICD-10-CM

## 2025-07-02 DIAGNOSIS — E61.1 IRON DEFICIENCY: ICD-10-CM

## 2025-07-02 LAB
25(OH)D3 SERPL-MCNC: 91 NG/ML (ref 30–100)
ALBUMIN SERPL BCP-MCNC: 4.7 G/DL (ref 3.2–4.9)
ALBUMIN/GLOB SERPL: 1.6 G/DL
ALP SERPL-CCNC: 72 U/L (ref 30–99)
ALT SERPL-CCNC: 114 U/L (ref 2–50)
ANION GAP SERPL CALC-SCNC: 15 MMOL/L (ref 7–16)
AST SERPL-CCNC: 67 U/L (ref 12–45)
BILIRUB SERPL-MCNC: 0.5 MG/DL (ref 0.1–1.5)
BUN SERPL-MCNC: 17 MG/DL (ref 8–22)
CALCIUM ALBUM COR SERPL-MCNC: 8.8 MG/DL (ref 8.5–10.5)
CALCIUM SERPL-MCNC: 9.4 MG/DL (ref 8.5–10.5)
CHLORIDE SERPL-SCNC: 104 MMOL/L (ref 96–112)
CO2 SERPL-SCNC: 22 MMOL/L (ref 20–33)
CREAT SERPL-MCNC: 1.22 MG/DL (ref 0.5–1.4)
FERRITIN SERPL-MCNC: 33.6 NG/ML (ref 22–322)
GFR SERPLBLD CREATININE-BSD FMLA CKD-EPI: 70 ML/MIN/1.73 M 2
GLOBULIN SER CALC-MCNC: 3 G/DL (ref 1.9–3.5)
GLUCOSE SERPL-MCNC: 98 MG/DL (ref 65–99)
HCT VFR BLD AUTO: 50.9 % (ref 42–52)
HGB BLD-MCNC: 16.5 G/DL (ref 14–18)
POTASSIUM SERPL-SCNC: 4.1 MMOL/L (ref 3.6–5.5)
PROT SERPL-MCNC: 7.7 G/DL (ref 6–8.2)
PSA SERPL DL<=0.01 NG/ML-MCNC: 1.77 NG/ML (ref 0–4)
SODIUM SERPL-SCNC: 141 MMOL/L (ref 135–145)
VIT B12 SERPL-MCNC: 1870 PG/ML (ref 211–911)

## 2025-07-02 PROCEDURE — 82728 ASSAY OF FERRITIN: CPT

## 2025-07-02 PROCEDURE — 82306 VITAMIN D 25 HYDROXY: CPT

## 2025-07-02 PROCEDURE — 84403 ASSAY OF TOTAL TESTOSTERONE: CPT

## 2025-07-02 PROCEDURE — 85018 HEMOGLOBIN: CPT

## 2025-07-02 PROCEDURE — 80053 COMPREHEN METABOLIC PANEL: CPT

## 2025-07-02 PROCEDURE — 84153 ASSAY OF PSA TOTAL: CPT

## 2025-07-02 PROCEDURE — 82607 VITAMIN B-12: CPT

## 2025-07-02 PROCEDURE — 85014 HEMATOCRIT: CPT

## 2025-07-02 PROCEDURE — 84270 ASSAY OF SEX HORMONE GLOBUL: CPT

## 2025-07-02 PROCEDURE — 84402 ASSAY OF FREE TESTOSTERONE: CPT

## 2025-07-02 PROCEDURE — 99195 PHLEBOTOMY: CPT

## 2025-07-02 RX ORDER — ATROPINE SULFATE 1 MG/ML
0.5 INJECTION, SOLUTION INTRAVENOUS PRN
OUTPATIENT
Start: 2025-07-02

## 2025-07-02 RX ORDER — SODIUM CHLORIDE 9 MG/ML
500 INJECTION, SOLUTION INTRAVENOUS ONCE
OUTPATIENT
Start: 2025-07-02 | End: 2025-07-02

## 2025-07-04 LAB
SHBG SERPL-SCNC: 18 NMOL/L (ref 19–76)
TESTOST FREE MFR SERPL: 2.5 % (ref 1.6–2.9)
TESTOST FREE SERPL-MCNC: 153 PG/ML (ref 47–244)
TESTOST SERPL-MCNC: 606 NG/DL (ref 300–890)

## 2025-07-28 ENCOUNTER — TELEPHONE (OUTPATIENT)
Dept: ENDOCRINOLOGY | Facility: MEDICAL CENTER | Age: 55
End: 2025-07-28
Payer: COMMERCIAL

## 2025-07-28 NOTE — TELEPHONE ENCOUNTER
DOCUMENTATION OF PAR STATUS: (Key: [id number])    1. Name of Medication & Dose: Testosterone Cypionate 200MG/ML intramuscular solution     2. Name of Prescription Coverage Company & phone #: [employer]    3. Date Prior Auth Submitted: 7/28/25    4. What information was given to obtain insurance decision? Chart notes    5. Prior Auth Status? Pending    6. Patient Notified: yes

## 2025-07-28 NOTE — TELEPHONE ENCOUNTER
Called and spoke with pt regarding PA for testosterone. I advised pt that the reason why it was denied is because it was being filled sooner than it had to be. Pt stated he was indeed not trying to fill his prescription but just renew the PA for the year since it is coming closer to ending date in September. I advised pt that there should be no issues with filling his rx until PA ends.

## 2025-08-05 DIAGNOSIS — E29.1 HYPOGONADISM IN MALE: Primary | ICD-10-CM

## 2025-08-11 ENCOUNTER — TELEPHONE (OUTPATIENT)
Dept: ENDOCRINOLOGY | Facility: MEDICAL CENTER | Age: 55
End: 2025-08-11
Payer: COMMERCIAL

## 2025-08-12 ENCOUNTER — TELEMEDICINE (OUTPATIENT)
Dept: ENDOCRINOLOGY | Facility: MEDICAL CENTER | Age: 55
End: 2025-08-12
Attending: INTERNAL MEDICINE
Payer: COMMERCIAL

## 2025-08-12 ENCOUNTER — TELEPHONE (OUTPATIENT)
Dept: ENDOCRINOLOGY | Facility: MEDICAL CENTER | Age: 55
End: 2025-08-12

## 2025-08-12 VITALS — HEIGHT: 70 IN | BODY MASS INDEX: 29.35 KG/M2 | WEIGHT: 205 LBS

## 2025-08-12 DIAGNOSIS — E61.1 IRON DEFICIENCY: ICD-10-CM

## 2025-08-12 DIAGNOSIS — E53.8 B12 DEFICIENCY: ICD-10-CM

## 2025-08-12 DIAGNOSIS — N52.9 ERECTILE DYSFUNCTION, UNSPECIFIED ERECTILE DYSFUNCTION TYPE: ICD-10-CM

## 2025-08-12 DIAGNOSIS — E55.9 VITAMIN D DEFICIENCY: ICD-10-CM

## 2025-08-12 DIAGNOSIS — E29.1 HYPOGONADISM IN MALE: Primary | ICD-10-CM

## 2025-08-12 DIAGNOSIS — Z31.69 INFERTILITY COUNSELING: ICD-10-CM

## 2025-08-12 DIAGNOSIS — N52.1 ERECTILE DYSFUNCTION DUE TO DISEASES CLASSIFIED ELSEWHERE: ICD-10-CM

## 2025-08-12 PROCEDURE — 99214 OFFICE O/P EST MOD 30 MIN: CPT | Mod: 95 | Performed by: INTERNAL MEDICINE

## 2025-08-12 RX ORDER — VARDENAFIL HYDROCHLORIDE 20 MG/1
20 TABLET ORAL PRN
Qty: 10 TABLET | Refills: 3 | Status: SHIPPED | OUTPATIENT
Start: 2025-08-12

## 2025-08-29 ENCOUNTER — TELEPHONE (OUTPATIENT)
Facility: MEDICAL CENTER | Age: 55
End: 2025-08-29
Payer: COMMERCIAL

## 2025-09-03 ENCOUNTER — APPOINTMENT (OUTPATIENT)
Dept: CARDIOLOGY | Facility: PHYSICIAN GROUP | Age: 55
End: 2025-09-03
Attending: INTERNAL MEDICINE
Payer: COMMERCIAL